# Patient Record
Sex: FEMALE | Race: WHITE | NOT HISPANIC OR LATINO | Employment: FULL TIME | URBAN - METROPOLITAN AREA
[De-identification: names, ages, dates, MRNs, and addresses within clinical notes are randomized per-mention and may not be internally consistent; named-entity substitution may affect disease eponyms.]

---

## 2020-07-08 ENCOUNTER — HOSPITAL ENCOUNTER (EMERGENCY)
Facility: HOSPITAL | Age: 25
Discharge: HOME | End: 2020-07-08
Admitting: EMERGENCY MEDICINE

## 2020-07-08 ENCOUNTER — APPOINTMENT (OUTPATIENT)
Dept: CT IMAGING | Facility: HOSPITAL | Age: 25
End: 2020-07-08

## 2020-07-08 VITALS
TEMPERATURE: 98.2 F | DIASTOLIC BLOOD PRESSURE: 64 MMHG | RESPIRATION RATE: 18 BRPM | HEIGHT: 62 IN | OXYGEN SATURATION: 98 % | WEIGHT: 155.2 LBS | HEART RATE: 99 BPM | SYSTOLIC BLOOD PRESSURE: 104 MMHG | BODY MASS INDEX: 28.56 KG/M2

## 2020-07-08 DIAGNOSIS — Z3A.22 22 WEEKS GESTATION OF PREGNANCY: ICD-10-CM

## 2020-07-08 DIAGNOSIS — R41.3 AMNESIA: Primary | ICD-10-CM

## 2020-07-08 LAB
ALBUMIN SERPL-MCNC: 3.5 G/DL (ref 3.5–5.2)
ALBUMIN/GLOB SERPL: 1.2 G/DL
ALP SERPL-CCNC: 86 U/L (ref 39–117)
ALT SERPL W P-5'-P-CCNC: 5 U/L (ref 1–33)
AMORPH URATE CRY URNS QL MICRO: ABNORMAL /HPF
ANION GAP SERPL CALCULATED.3IONS-SCNC: 11 MMOL/L (ref 5–15)
AST SERPL-CCNC: 16 U/L (ref 1–32)
BACTERIA UR QL AUTO: ABNORMAL /HPF
BASOPHILS # BLD AUTO: 0.1 10*3/MM3 (ref 0–0.2)
BASOPHILS NFR BLD AUTO: 0.7 % (ref 0–1.5)
BILIRUB SERPL-MCNC: 0.3 MG/DL (ref 0–1.2)
BILIRUB UR QL STRIP: NEGATIVE
BUN SERPL-MCNC: ABNORMAL MG/DL
BUN/CREAT SERPL: ABNORMAL
CALCIUM SPEC-SCNC: 8.8 MG/DL (ref 8.6–10.5)
CHLORIDE SERPL-SCNC: 106 MMOL/L (ref 98–107)
CLARITY UR: ABNORMAL
CO2 SERPL-SCNC: 23 MMOL/L (ref 22–29)
COLOR UR: ABNORMAL
CREAT SERPL-MCNC: 0.46 MG/DL (ref 0.57–1)
DEPRECATED RDW RBC AUTO: 35.4 FL (ref 37–54)
EOSINOPHIL # BLD AUTO: 0.1 10*3/MM3 (ref 0–0.4)
EOSINOPHIL NFR BLD AUTO: 0.7 % (ref 0.3–6.2)
ERYTHROCYTE [DISTWIDTH] IN BLOOD BY AUTOMATED COUNT: 15.5 % (ref 12.3–15.4)
GFR SERPL CREATININE-BSD FRML MDRD: >150 ML/MIN/1.73
GLOBULIN UR ELPH-MCNC: 2.9 GM/DL
GLUCOSE SERPL-MCNC: 100 MG/DL (ref 65–99)
GLUCOSE UR STRIP-MCNC: ABNORMAL MG/DL
HCT VFR BLD AUTO: 29.8 % (ref 34–46.6)
HGB BLD-MCNC: 9.6 G/DL (ref 12–15.9)
HGB UR QL STRIP.AUTO: NEGATIVE
HYALINE CASTS UR QL AUTO: ABNORMAL /LPF
KETONES UR QL STRIP: NEGATIVE
LEUKOCYTE ESTERASE UR QL STRIP.AUTO: ABNORMAL
LYMPHOCYTES # BLD AUTO: 2.3 10*3/MM3 (ref 0.7–3.1)
LYMPHOCYTES NFR BLD AUTO: 28.7 % (ref 19.6–45.3)
MCH RBC QN AUTO: 21.1 PG (ref 26.6–33)
MCHC RBC AUTO-ENTMCNC: 32.1 G/DL (ref 31.5–35.7)
MCV RBC AUTO: 65.7 FL (ref 79–97)
MONOCYTES # BLD AUTO: 0.7 10*3/MM3 (ref 0.1–0.9)
MONOCYTES NFR BLD AUTO: 8.5 % (ref 5–12)
MUCOUS THREADS URNS QL MICRO: ABNORMAL /HPF
NEUTROPHILS NFR BLD AUTO: 5 10*3/MM3 (ref 1.7–7)
NEUTROPHILS NFR BLD AUTO: 61.4 % (ref 42.7–76)
NITRITE UR QL STRIP: NEGATIVE
NRBC BLD AUTO-RTO: 0 /100 WBC (ref 0–0.2)
PH UR STRIP.AUTO: 8.5 [PH] (ref 5–8)
PLATELET # BLD AUTO: 303 10*3/MM3 (ref 140–450)
PMV BLD AUTO: 7.7 FL (ref 6–12)
POTASSIUM SERPL-SCNC: 3.7 MMOL/L (ref 3.5–5.2)
PROT SERPL-MCNC: 6.4 G/DL (ref 6–8.5)
PROT UR QL STRIP: ABNORMAL
RBC # BLD AUTO: 4.54 10*6/MM3 (ref 3.77–5.28)
RBC # UR: ABNORMAL /HPF
REF LAB TEST METHOD: ABNORMAL
SODIUM SERPL-SCNC: 140 MMOL/L (ref 136–145)
SP GR UR STRIP: 1.02 (ref 1–1.03)
SQUAMOUS #/AREA URNS HPF: ABNORMAL /HPF
UROBILINOGEN UR QL STRIP: ABNORMAL
WBC # BLD AUTO: 8.1 10*3/MM3 (ref 3.4–10.8)
WBC UR QL AUTO: ABNORMAL /HPF

## 2020-07-08 PROCEDURE — 80053 COMPREHEN METABOLIC PANEL: CPT | Performed by: NURSE PRACTITIONER

## 2020-07-08 PROCEDURE — 70450 CT HEAD/BRAIN W/O DYE: CPT

## 2020-07-08 PROCEDURE — 81001 URINALYSIS AUTO W/SCOPE: CPT | Performed by: NURSE PRACTITIONER

## 2020-07-08 PROCEDURE — 85025 COMPLETE CBC W/AUTO DIFF WBC: CPT | Performed by: NURSE PRACTITIONER

## 2020-07-08 PROCEDURE — 93005 ELECTROCARDIOGRAM TRACING: CPT

## 2020-07-08 PROCEDURE — 99284 EMERGENCY DEPT VISIT MOD MDM: CPT

## 2020-07-08 NOTE — ED PROVIDER NOTES
Subjective   Patient is a 25-year-old female complains of not remembering driving from [place] to [place] . patient states she had a similar episode last night when she drove from [place] to [place] and did not remember driving there.  Patient denies having a wreck and arrived at her destination safely.  States she has been having intermittent periods of lightheadedness and headaches.  Currently she is 22 weeks 1 day pregnant, OBGYN Dr. Reyes in [place].  LMP 2020.  Denies any vaginal bleeding abdominal pain short of breath chest pain numbness tingling or history of seizures.  Difficulty remembering is intermittent, mild, episodic, started 1 day ago.   A1          Review of Systems   Constitutional: Negative for chills, fatigue and fever.   HENT: Negative for congestion, sore throat, tinnitus and trouble swallowing.    Eyes: Negative for photophobia, discharge and visual disturbance.   Respiratory: Negative for cough and shortness of breath.    Cardiovascular: Negative for chest pain.   Gastrointestinal: Negative for abdominal pain, diarrhea, nausea and vomiting.   Genitourinary: Negative for dysuria, frequency and urgency.   Musculoskeletal: Negative for back pain and myalgias.   Skin: Negative for rash.   Neurological: Positive for light-headedness and headaches. Negative for dizziness.        Difficulty remembering   Psychiatric/Behavioral: Negative for confusion.       No past medical history on file.    Allergies   Allergen Reactions   • Bactrim [Sulfamethoxazole-Trimethoprim] Hives       No past surgical history on file.    No family history on file.    Social History     Socioeconomic History   • Marital status:      Spouse name: Not on file   • Number of children: Not on file   • Years of education: Not on file   • Highest education level: Not on file           Objective   Physical Exam   Constitutional: She is oriented to person, place, and time. She appears well-developed and  well-nourished.   HENT:   Head: Normocephalic and atraumatic.   Mouth/Throat: Oropharynx is clear and moist.   Eyes: Pupils are equal, round, and reactive to light. EOM are normal.   Neck: Normal range of motion. Neck supple.   Cardiovascular: Normal heart sounds.   Trace edema quan lower ext   Pulmonary/Chest: Effort normal and breath sounds normal. She has no wheezes. She has no rales.   Abdominal: Soft. Bowel sounds are normal.   22 weeks pregnant   Musculoskeletal: Normal range of motion.   Neurological: She is alert and oriented to person, place, and time. She has normal strength. No cranial nerve deficit or sensory deficit. GCS eye subscore is 4. GCS verbal subscore is 5. GCS motor subscore is 6.   Speech clear, no ataxia, no drift.   Skin: Skin is warm and dry.   Vitals reviewed.      ECG 12 Lead    Date/Time: 7/8/2020 5:12 PM  Performed by: Heather Gold APRN  Authorized by: Heather Gold APRN   Interpreted by physician  Comparison: not compared with previous ECG   Rhythm: sinus rhythm  Rate: normal  QRS axis: normal  Conduction: conduction normal  ST Segments: ST segments normal  Clinical impression: normal ECG                 ED Course.vs  Labs Reviewed   COMPREHENSIVE METABOLIC PANEL - Abnormal; Notable for the following components:       Result Value    Glucose 100 (*)     Creatinine 0.46 (*)     All other components within normal limits    Narrative:     GFR Normal >60  Chronic Kidney Disease <60  Kidney Failure <15     URINALYSIS W/ CULTURE IF INDICATED - Abnormal; Notable for the following components:    Color, UA Dark Yellow (*)     Appearance, UA Cloudy (*)     pH, UA 8.5 (*)     Glucose,  mg/dL (Trace) (*)     Protein, UA Trace (*)     Leuk Esterase, UA Moderate (2+) (*)     Urobilinogen, UA 2.0 E.U./dL (*)     All other components within normal limits   CBC WITH AUTO DIFFERENTIAL - Abnormal; Notable for the following components:    Hemoglobin 9.6 (*)     Hematocrit 29.8 (*)     MCV  65.7 (*)     MCH 21.1 (*)     RDW 15.5 (*)     RDW-SD 35.4 (*)     All other components within normal limits   URINALYSIS, MICROSCOPIC ONLY - Abnormal; Notable for the following components:    RBC, UA 0-2 (*)     WBC, UA 3-5 (*)     Bacteria, UA 1+ (*)     Squamous Epithelial Cells, UA 7-12 (*)     Mucus, UA Small/1+ (*)     All other components within normal limits   BUN - Normal   CBC AND DIFFERENTIAL    Narrative:     The following orders were created for panel order CBC & Differential.  Procedure                               Abnormality         Status                     ---------                               -----------         ------                     CBC Auto Differential[[id number]]        Abnormal            Final result                 Please view results for these tests on the individual orders.     Medications - No data to display  Ct Head Without Contrast    Result Date: 7/8/2020  No acute intracranial process identified.  Electronically Signed By-DR. Don Jamison MD On:7/8/2020 9:01 PM This report was finalized on [id number] by DR. Don Jamison MD.      ED Course as of Jul 08 2134   Wed Jul 08, 2020 2132 Spoke with Dr. Pinzon.    [KJ]      ED Course User Index  [KJ] Heather Gold, APRN                                           MDM  Number of Diagnoses or Management Options  22 weeks gestation of pregnancy:   Amnesia:     Fetal heart tones in the 140s.  CBC showed normal white count CMP glucose of 100 otherwise unremarkable, UA showed red and white cells with mucus and moderate leukocytes but appears to be contaminated.  CT head showed no acute process.  Discussed with patient if she was having periods of amnesia to no longer drive or operate heavy machinery until cleared by neurology.  Consulted Dr. Pinzon who was on-call for Dr. Reyes OB/GYN.  She stated for patient to call the office in the a.m. to see her OB/GYN this week and to make appointment with Dr. Joshua,  neurologist at [facility].  Return for worsening of symptoms.  Final diagnoses:   22 weeks gestation of pregnancy   Amnesia            Heather Gold, APRN  07/08/20 2518

## 2020-07-09 LAB — BUN SERPL-MCNC: 4 MG/DL (ref 6–20)

## 2020-07-09 NOTE — ED NOTES
Spoke with Dr. Robles at [facility] and she said she is going to get a hold of the OB on call and to have them call us.      Ita Larson  07/08/20 0559

## 2020-07-09 NOTE — DISCHARGE INSTRUCTIONS
Discharge home.  Call in the morning to schedule an appointment this week with your OB/GYN.  Call tomorrow and set up a neurologist appointment with  for further evaluation of episodic amnesia.  Do not operate heavy machinery and do not drive a car until further evaluated by neurology.  Return to the ER for worsening symptoms.

## 2022-05-19 ENCOUNTER — HOSPITAL ENCOUNTER (EMERGENCY)
Facility: HOSPITAL | Age: 27
Discharge: HOME | End: 2022-05-19
Attending: EMERGENCY MEDICINE | Admitting: EMERGENCY MEDICINE

## 2022-05-19 ENCOUNTER — APPOINTMENT (OUTPATIENT)
Dept: RESPIRATORY THERAPY | Facility: HOSPITAL | Age: 27
End: 2022-05-19

## 2022-05-19 ENCOUNTER — APPOINTMENT (OUTPATIENT)
Dept: GENERAL RADIOLOGY | Facility: HOSPITAL | Age: 27
End: 2022-05-19

## 2022-05-19 VITALS
RESPIRATION RATE: 16 BRPM | HEART RATE: 94 BPM | BODY MASS INDEX: 36.25 KG/M2 | OXYGEN SATURATION: 99 % | WEIGHT: 197 LBS | SYSTOLIC BLOOD PRESSURE: 102 MMHG | TEMPERATURE: 98.3 F | HEIGHT: 62 IN | DIASTOLIC BLOOD PRESSURE: 64 MMHG

## 2022-05-19 DIAGNOSIS — R00.2 PALPITATION: ICD-10-CM

## 2022-05-19 DIAGNOSIS — R07.9 CHEST PAIN, UNSPECIFIED TYPE: Primary | ICD-10-CM

## 2022-05-19 DIAGNOSIS — R00.2 PALPITATIONS: ICD-10-CM

## 2022-05-19 LAB
ALBUMIN SERPL-MCNC: 4 G/DL (ref 3.5–5.2)
ALBUMIN/GLOB SERPL: 1.1 G/DL
ALP SERPL-CCNC: 128 U/L (ref 39–117)
ALT SERPL W P-5'-P-CCNC: 24 U/L (ref 1–33)
ANION GAP SERPL CALCULATED.3IONS-SCNC: 12 MMOL/L (ref 5–15)
AST SERPL-CCNC: 20 U/L (ref 1–32)
B-HCG UR QL: NEGATIVE
BACTERIA UR QL AUTO: ABNORMAL /HPF
BASOPHILS # BLD AUTO: 0.1 10*3/MM3 (ref 0–0.2)
BASOPHILS NFR BLD AUTO: 0.5 % (ref 0–1.5)
BILIRUB SERPL-MCNC: 0.2 MG/DL (ref 0–1.2)
BILIRUB UR QL STRIP: NEGATIVE
BUN SERPL-MCNC: 8 MG/DL (ref 6–20)
BUN/CREAT SERPL: 12.9 (ref 7–25)
CALCIUM SPEC-SCNC: 8.9 MG/DL (ref 8.6–10.5)
CHLORIDE SERPL-SCNC: 104 MMOL/L (ref 98–107)
CLARITY UR: CLEAR
CO2 SERPL-SCNC: 22 MMOL/L (ref 22–29)
COLOR UR: YELLOW
CREAT SERPL-MCNC: 0.62 MG/DL (ref 0.57–1)
D DIMER PPP FEU-MCNC: 0.43 MG/L (FEU) (ref 0–0.59)
DEPRECATED RDW RBC AUTO: 38.5 FL (ref 37–54)
EGFRCR SERPLBLD CKD-EPI 2021: 127.7 ML/MIN/1.73
EOSINOPHIL # BLD AUTO: 0.1 10*3/MM3 (ref 0–0.4)
EOSINOPHIL NFR BLD AUTO: 1.2 % (ref 0.3–6.2)
ERYTHROCYTE [DISTWIDTH] IN BLOOD BY AUTOMATED COUNT: 14.9 % (ref 12.3–15.4)
GLOBULIN UR ELPH-MCNC: 3.6 GM/DL
GLUCOSE SERPL-MCNC: 93 MG/DL (ref 65–99)
GLUCOSE UR STRIP-MCNC: NEGATIVE MG/DL
HCT VFR BLD AUTO: 41.3 % (ref 34–46.6)
HGB BLD-MCNC: 13.7 G/DL (ref 12–15.9)
HGB UR QL STRIP.AUTO: NEGATIVE
HYALINE CASTS UR QL AUTO: ABNORMAL /LPF
KETONES UR QL STRIP: NEGATIVE
LEUKOCYTE ESTERASE UR QL STRIP.AUTO: ABNORMAL
LYMPHOCYTES # BLD AUTO: 3.5 10*3/MM3 (ref 0.7–3.1)
LYMPHOCYTES NFR BLD AUTO: 33.6 % (ref 19.6–45.3)
MAGNESIUM SERPL-MCNC: 1.9 MG/DL (ref 1.6–2.6)
MCH RBC QN AUTO: 24 PG (ref 26.6–33)
MCHC RBC AUTO-ENTMCNC: 33.1 G/DL (ref 31.5–35.7)
MCV RBC AUTO: 72.6 FL (ref 79–97)
MONOCYTES # BLD AUTO: 0.7 10*3/MM3 (ref 0.1–0.9)
MONOCYTES NFR BLD AUTO: 7 % (ref 5–12)
NEUTROPHILS NFR BLD AUTO: 57.7 % (ref 42.7–76)
NEUTROPHILS NFR BLD AUTO: 6 10*3/MM3 (ref 1.7–7)
NITRITE UR QL STRIP: NEGATIVE
NRBC BLD AUTO-RTO: 0.1 /100 WBC (ref 0–0.2)
PH UR STRIP.AUTO: 7.5 [PH] (ref 5–8)
PLATELET # BLD AUTO: 356 10*3/MM3 (ref 140–450)
PMV BLD AUTO: 7.2 FL (ref 6–12)
POTASSIUM SERPL-SCNC: 4.2 MMOL/L (ref 3.5–5.2)
PROT SERPL-MCNC: 7.6 G/DL (ref 6–8.5)
PROT UR QL STRIP: NEGATIVE
RBC # BLD AUTO: 5.68 10*6/MM3 (ref 3.77–5.28)
RBC # UR STRIP: ABNORMAL /HPF
REF LAB TEST METHOD: ABNORMAL
SODIUM SERPL-SCNC: 138 MMOL/L (ref 136–145)
SP GR UR STRIP: 1.01 (ref 1–1.03)
SQUAMOUS #/AREA URNS HPF: ABNORMAL /HPF
TROPONIN T SERPL-MCNC: <0.01 NG/ML (ref 0–0.03)
TSH SERPL DL<=0.05 MIU/L-ACNC: 0.86 UIU/ML (ref 0.27–4.2)
UROBILINOGEN UR QL STRIP: ABNORMAL
WBC # UR STRIP: ABNORMAL /HPF
WBC NRBC COR # BLD: 10.4 10*3/MM3 (ref 3.4–10.8)

## 2022-05-19 PROCEDURE — 85379 FIBRIN DEGRADATION QUANT: CPT | Performed by: NURSE PRACTITIONER

## 2022-05-19 PROCEDURE — 36415 COLL VENOUS BLD VENIPUNCTURE: CPT

## 2022-05-19 PROCEDURE — 99283 EMERGENCY DEPT VISIT LOW MDM: CPT

## 2022-05-19 PROCEDURE — 83735 ASSAY OF MAGNESIUM: CPT | Performed by: NURSE PRACTITIONER

## 2022-05-19 PROCEDURE — 81001 URINALYSIS AUTO W/SCOPE: CPT | Performed by: NURSE PRACTITIONER

## 2022-05-19 PROCEDURE — 84443 ASSAY THYROID STIM HORMONE: CPT | Performed by: NURSE PRACTITIONER

## 2022-05-19 PROCEDURE — 84484 ASSAY OF TROPONIN QUANT: CPT | Performed by: NURSE PRACTITIONER

## 2022-05-19 PROCEDURE — 93005 ELECTROCARDIOGRAM TRACING: CPT

## 2022-05-19 PROCEDURE — 93005 ELECTROCARDIOGRAM TRACING: CPT | Performed by: EMERGENCY MEDICINE

## 2022-05-19 PROCEDURE — 85025 COMPLETE CBC W/AUTO DIFF WBC: CPT | Performed by: NURSE PRACTITIONER

## 2022-05-19 PROCEDURE — 93242 EXT ECG>48HR<7D RECORDING: CPT

## 2022-05-19 PROCEDURE — 81025 URINE PREGNANCY TEST: CPT | Performed by: NURSE PRACTITIONER

## 2022-05-19 PROCEDURE — 80053 COMPREHEN METABOLIC PANEL: CPT | Performed by: NURSE PRACTITIONER

## 2022-05-19 PROCEDURE — 93225 XTRNL ECG REC<48 HRS REC: CPT

## 2022-05-19 PROCEDURE — 71045 X-RAY EXAM CHEST 1 VIEW: CPT

## 2022-05-19 RX ORDER — SODIUM CHLORIDE 0.9 % (FLUSH) 0.9 %
10 SYRINGE (ML) INJECTION AS NEEDED
Status: DISCONTINUED | OUTPATIENT
Start: 2022-05-19 | End: 2022-05-19 | Stop reason: HOSPADM

## 2022-05-19 NOTE — ED NOTES
Pt reports ongoing midsternal chest pain with SOB. Pt has hx of anxiety/depression and does take medications. Pt reports frustration with pcp and cardiologist with lack of availability to be seen and informed she just wanted to come to ED for an evaluation.

## 2022-05-20 NOTE — DISCHARGE INSTRUCTIONS
Wear Holter monitor as instructed.  Continue any current home medications.  Rest.  Drink plenty of fluids.  Follow-up with your primary care provider and cardiology.  Return for new or worsening symptoms.

## 2022-05-20 NOTE — ED PROVIDER NOTES
Subjective   Patient is a 26-year-old white female with no significant medical history who presents today with complaints of sharp central chest pain and shortness of breath.  She denies any radiation of her pain.  She states she has had the symptoms intermittently for the last couple of years.  She states its been about 2 years since she last saw her cardiologist who at that time performed Holter monitor and echocardiogram.  She states in the last couple of weeks she has had increase in frequency of her symptoms though she is also had an increase in stress at her job.  She states she followed up with a cardiologist earlier in the week and states she is supposed to have some further testing done but states she has not heard back from the office.  She presents to the ED today for evaluation.  She states at times she feels like her heart is racing.  She denies any syncope.  Denies fever chills cough congestion leg pain or swelling.  She denies alleviating or exacerbating factors.          Review of Systems   Constitutional: Negative.    HENT: Negative.    Eyes: Negative.    Respiratory: Positive for shortness of breath.    Cardiovascular: Positive for chest pain and palpitations. Negative for leg swelling.   Gastrointestinal: Negative.    Genitourinary: Negative.    Musculoskeletal: Negative.    Skin: Negative.    Neurological: Negative.        No past medical history on file.    Allergies   Allergen Reactions   • Bactrim [Sulfamethoxazole-Trimethoprim] Hives       No past surgical history on file.    No family history on file.    Social History     Socioeconomic History   • Marital status:            Objective   Physical Exam  Vital signs and triage nurse note reviewed.  Constitutional: Awake, alert; well-developed and well-nourished. No acute distress is noted.  Obese.  HEENT: Normocephalic, atraumatic; pupils are PERRL with intact EOM; oropharynx is pink and moist without exudate or erythema.  No drooling or  pooling of oral secretions.  Neck: Supple, full range of motion without pain; no cervical lymphadenopathy. Normal phonation.  Cardiovascular: Regular rate and rhythm, normal S1-S2.  No murmur noted.  Pulmonary: Respiratory effort regular nonlabored, breath sounds clear to auscultation all fields.  Abdomen: Soft, nontender, nondistended with normoactive bowel sounds; no rebound or guarding.  Musculoskeletal: Independent range of motion of all extremities with no palpable tenderness or edema.  Neuro: Alert oriented x3, speech is clear and appropriate, GCS 15.    Skin: Flesh tone, warm, dry, intact; no erythematous or petechial rash or lesion.      Procedures           ED Course      Labs Reviewed   COMPREHENSIVE METABOLIC PANEL - Abnormal; Notable for the following components:       Result Value    Alkaline Phosphatase 128 (*)     All other components within normal limits    Narrative:     GFR Normal >60  Chronic Kidney Disease <60  Kidney Failure <15     URINALYSIS W/ MICROSCOPIC IF INDICATED (NO CULTURE) - Abnormal; Notable for the following components:    Leuk Esterase, UA Small (1+) (*)     All other components within normal limits   CBC WITH AUTO DIFFERENTIAL - Abnormal; Notable for the following components:    RBC 5.68 (*)     MCV 72.6 (*)     MCH 24.0 (*)     Lymphocytes, Absolute 3.50 (*)     All other components within normal limits   URINALYSIS, MICROSCOPIC ONLY - Abnormal; Notable for the following components:    RBC, UA 0-2 (*)     WBC, UA 3-5 (*)     Bacteria, UA Trace (*)     Squamous Epithelial Cells, UA 7-12 (*)     All other components within normal limits   PREGNANCY, URINE - Normal   TROPONIN (IN-HOUSE) - Normal    Narrative:     Troponin T Reference Range:  <= 0.03 ng/mL-   Negative for AMI  >0.03 ng/mL-     Abnormal for myocardial necrosis.  Clinicians would have to utilize clinical acumen, EKG, Troponin and serial changes to determine if it is an Acute Myocardial Infarction or myocardial injury  due to an underlying chronic condition.       Results may be falsely decreased if patient taking Biotin.     D-DIMER, QUANTITATIVE - Normal    Narrative:     Reference Range  --------------------------------------------------------------------     < 0.50   Negative Predictive Value  0.50-0.59   Indeterminate    >= 0.60   Probable VTE             A very low percentage of patients with DVT may yield D-Dimer results   below the cut-off of 0.50 mg/L FEU.  This is known to be more   prevalent in patients with distal DVT.             Results of this test should always be interpreted in conjunction with   the patient's medical history, clinical presentation and other   findings.  Clinical diagnosis should not be based on the result of   INNOVANCE D-Dimer alone.   MAGNESIUM - Normal   TSH - Normal   CBC AND DIFFERENTIAL    Narrative:     The following orders were created for panel order CBC & Differential.  Procedure                               Abnormality         Status                     ---------                               -----------         ------                     CBC Auto Differential[[id number]]        Abnormal            Final result               Scan Slide[[id number]]                                                                    Please view results for these tests on the individual orders.     XR Chest 1 View    Result Date: 5/19/2022  No active disease.  Electronically Signed By-Tristen Cunningham MD On:5/19/2022 6:31 PM This report was finalized on [id number] by  Tristen Cunningham MD.    Medications - No data to display                                             MDM  Number of Diagnoses or Management Options  Chest pain, unspecified type  Palpitations  Diagnosis management comments: Patient was placed on continuous cardiac monitor.  She had IV established.  She had labs, EKG and chest x-ray obtained.    Work-up: EKG reviewed by me and interpreted by Dr. Guerrero shows sinus rhythm with ventricular rate of 90,  no acute ST or T wave changes, no ectopy.  CBC and metabolic panel are unremarkable.  Negative troponin.  D-dimer within normal limits.  TSH within normal limits.  Normal magnesium.  Urine hCG negative.  Chest x-ray shows no acute abnormality.    On reexamination the patient is resting comfortably in no distress.  She has no new complaints.  She is hemodynamically stable.  She denies chest pain currently.  Patient had Holter monitor placed in the ED prior to discharge.    Diagnosis and treatment plan discussed with patient.  Patient agreeable to plan.   I discussed findings with patient who voices understanding of discharge instructions, signs and symptoms requiring return to ED; discharged improved and in stable condition with follow up for re-evaluation.         Amount and/or Complexity of Data Reviewed  Clinical lab tests: reviewed and ordered  Tests in the radiology section of CPT®: reviewed and ordered  Tests in the medicine section of CPT®: reviewed    Patient Progress  Patient progress: stable      Final diagnoses:   Chest pain, unspecified type   Palpitations       ED Disposition  ED Disposition     ED Disposition   Discharge    Condition   Stable    Comment   --             Toan Palm MD  [address] [place] [address]  [phone]          Berhane Kraus MD  [address] [facility] [place] [address]  [phone]          PATIENT CONNECTION - [place] [id number]  [phone]             Medication List      No changes were made to your prescriptions during this visit.          Vida Koo, MARICRUZ  05/20/22 0854

## 2022-05-22 LAB — QT INTERVAL: 351 MS

## 2022-06-08 ENCOUNTER — OFFICE VISIT (OUTPATIENT)
Dept: CARDIOLOGY | Facility: CLINIC | Age: 27
End: 2022-06-08

## 2022-06-08 VITALS
BODY MASS INDEX: 36.4 KG/M2 | HEIGHT: 62 IN | WEIGHT: 197.8 LBS | RESPIRATION RATE: 18 BRPM | HEART RATE: 105 BPM | DIASTOLIC BLOOD PRESSURE: 68 MMHG | SYSTOLIC BLOOD PRESSURE: 98 MMHG

## 2022-06-08 DIAGNOSIS — R00.2 PALPITATIONS: Primary | ICD-10-CM

## 2022-06-08 DIAGNOSIS — R07.89 CHEST PAIN, ATYPICAL: ICD-10-CM

## 2022-06-08 PROCEDURE — 99204 OFFICE O/P NEW MOD 45 MIN: CPT | Performed by: INTERNAL MEDICINE

## 2022-06-08 RX ORDER — BUPROPION HYDROCHLORIDE 150 MG/1
150 TABLET ORAL DAILY
Status: ON HOLD | COMMUNITY
Start: 2022-03-05 | End: 2023-03-05

## 2022-06-09 PROCEDURE — 93244 EXT ECG>48HR<7D REV&INTERPJ: CPT | Performed by: INTERNAL MEDICINE

## 2022-06-14 PROBLEM — R00.2 PALPITATIONS: Status: ACTIVE | Noted: 2022-06-14

## 2022-06-14 PROBLEM — R07.89 CHEST PAIN, ATYPICAL: Status: ACTIVE | Noted: 2022-06-14

## 2022-06-14 NOTE — PROGRESS NOTES
Cardiology Clinic Note  Toan Palm MD, PhD    Subjective:     Encounter Date:06/08/2022      Patient ID: Myrtle Clifford is a 27 y.o. female.    Chief Complaint:  Chief Complaint   Patient presents with   • Palpitations       HPI:    I had the pleasure to see this very pleasant 27-year-old female complaining of chest pain and palpitations, presented to the ER in May of this year for evaluation.  Blood pressure today 98/68 with heart rates in the low 100s.  She had a Holter monitor which demonstrated sinus rhythm, sinus tachycardia, occasional PVCs, 2D echo at an outside hospital revealed structurally normal heart with no significant valvular abnormality, normal myocardial thickness, no masses or effusions, normal EF 60%, normal RV size and function as well.  She is very low risk for any coronary disease and does not have any anginal chest pain.  She has no family history of early sudden cardiac death or CAD.  She is a former smoker which was very limited for only a couple of years.  Reassurance was provided with unremarkable findings by Holter monitor as well as structurally normal heart by 2D echo.  Would like her to increase activity with BMI greater than 35 for weight loss moving forward with obesity and young age.  This was discussed at length to minimize healthcare concerns moving forward as she goes through her life to get ahead of this early    Review of systems otherwise negative x14 point review of systems except was mentioned above    Historical data copied forward from previous encounters in EMR including the history, exam, and assessment/plan has been reviewed and is unchanged unless noted otherwise.    Cardiac medicines reviewed with risk, benefits, and necessity of each discussed.    Risk and benefit of cardiac testing reviewed including death heart attack stroke pain bleeding infection need for vascular /cardiovascular surgery were discussed and the patient     Objective:         BP 98/68 (BP  "Location: Left arm, Patient Position: Sitting)   Pulse 105   Resp 18   Ht 157.5 cm (62\")   Wt 89.7 kg (197 lb 12.8 oz)   BMI 36.18 kg/m²     Physical Exam  Regular rate and rhythm 90s, no rubs murmurs gallops no heaves or lift  Obese soft nontender nondistended  No clubbing cyanosis or edema  Radial pulses 2+ equal bilaterally  No carotid bruits or JVD  Normocephalic/atraumatic pupils equal round  Neurologically intact grossly  Assessment:         Atypical chest pain, palpitations  Normal Holter for 2 weeks  Normal echo  Structurally normal heart  Low risk for CAD  Does not need ischemic evaluation  Normal EKG with normal conduction    Primary prevention goals  Diet and exercise per AHA guidelines    Back to primary care    Back to clinic here in 1 year as needed    Greater than 25 minutes of face-to-face with the patient as well as in coordination of care, charting, review of documentation and EMR      The pleasure to be involved in this patient's cardiovascular care.  Please call with any questions or concerns  Toan Palm MD, PhD    Most recent EKG as reviewed and interpreted by me:  Procedures     Most recent echo as reviewed and interpreted by me:      Most recent stress test as reviewed and interpreted by me:      Most recent cardiac catheterization as reviewed interpreted by me:  No results found for this or any previous visit.    The following portions of the patient's history were reviewed and updated as appropriate: allergies, current medications, past family history, past medical history, past social history, past surgical history and problem list.      ROS:  14 point review of systems negative except as mentioned above    Current Outpatient Medications:   •  buPROPion XL (WELLBUTRIN XL) 150 MG 24 hr tablet, Take 150 mg by mouth Daily., Disp: , Rfl:     Problem List:  There is no problem list on file for this patient.    Past Medical History:  History reviewed. No pertinent past medical " history.  Past Surgical History:  History reviewed. No pertinent surgical history.  Social History:  Social History     Socioeconomic History   • Marital status:    Tobacco Use   • Smoking status: Former Smoker     Packs/day: 0.50     Years: 2.00     Pack years: 1.00     Types: Cigarettes     Quit date:      Years since quittin.4   Substance and Sexual Activity   • Alcohol use: Not Currently     Allergies:  Allergies   Allergen Reactions   • Bactrim [Sulfamethoxazole-Trimethoprim] Hives     Immunizations:    There is no immunization history on file for this patient.         In-Office Procedure(s):  No orders to display        ASCVD RIsk Score::  The ASCVD Risk score (Ponca RONALDO Rosado, et al., 2013) failed to calculate for the following reasons:    The 2013 ASCVD risk score is only valid for ages 40 to 79    Imaging:    Results for orders placed during the hospital encounter of 22    XR Chest 1 View    Narrative  DATE OF EXAM:  2022 6:08 PM    PROCEDURE:  XR CHEST 1 VW-    INDICATIONS:  Chest pain, shortness of breath, previous tobacco abuse.    COMPARISON:  No Comparisons Available    TECHNIQUE:  Single radiographic view of the chest was obtained.    FINDINGS:  The heart size is normal. The pulmonary vascular markings are normal.  The lungs and pleural spaces are clear of active disease.  The bony  thorax is normal for age.    Impression  No active disease.    Electronically Signed By-Tristen Cunningham MD On:2022 6:31 PM  This report was finalized on [id number] by  Tristen Cunningham MD.       Results for orders placed during the hospital encounter of 20    CT Head Without Contrast    Narrative  CT HEAD WO CONTRAST-    Date of Exam: 2020 8:35 PM    Indication: amnesia, lightheaded, headaches.    Comparison: None available.    Technique: CT scan of the head without IV contrast.  Automated exposure  control and iterative reconstruction methods were used.    FINDINGS  No acute intracranial  hemorrhage or extra-axial collection is  identified. The ventricles appear normal in caliber, with no evidence of  mass effect or midline shift. The basal cisterns appear patent. The  gray-white differentiation appears preserved.    The calvarium appears intact. The visualized paranasal sinuses are  clear. The mastoid air cells are well-aerated.    Impression  No acute intracranial process identified.    Electronically Signed By-DR. Don Jamison MD On:7/8/2020 9:01 PM  This report was finalized on [id number] by DR. Don Jamison MD.      Results for orders placed during the hospital encounter of 07/08/20    CT Head Without Contrast    Narrative  CT HEAD WO CONTRAST-    Date of Exam: 7/8/2020 8:35 PM    Indication: amnesia, lightheaded, headaches.    Comparison: None available.    Technique: CT scan of the head without IV contrast.  Automated exposure  control and iterative reconstruction methods were used.    FINDINGS  No acute intracranial hemorrhage or extra-axial collection is  identified. The ventricles appear normal in caliber, with no evidence of  mass effect or midline shift. The basal cisterns appear patent. The  gray-white differentiation appears preserved.    The calvarium appears intact. The visualized paranasal sinuses are  clear. The mastoid air cells are well-aerated.    Impression  No acute intracranial process identified.    Electronically Signed By-DR. Don Jamison MD On:7/8/2020 9:01 PM  This report was finalized on [id number] by DR. Don Jamison MD.      Lab Review:   Admission on 05/19/2022, Discharged on 05/19/2022   Component Date Value   • QT Interval 05/19/2022 351    • Glucose 05/19/2022 93    • BUN 05/19/2022 8    • Creatinine 05/19/2022 0.62    • Sodium 05/19/2022 138    • Potassium 05/19/2022 4.2    • Chloride 05/19/2022 104    • CO2 05/19/2022 22.0    • Calcium 05/19/2022 8.9    • Total Protein 05/19/2022 7.6    • Albumin 05/19/2022 4.00    • ALT (SGPT) 05/19/2022  24    • AST (SGOT) 05/19/2022 20    • Alkaline Phosphatase 05/19/2022 128 (A)   • Total Bilirubin 05/19/2022 0.2    • Globulin 05/19/2022 3.6    • A/G Ratio 05/19/2022 1.1    • BUN/Creatinine Ratio 05/19/2022 12.9    • Anion Gap 05/19/2022 12.0    • eGFR 05/19/2022 127.7    • HCG, Urine QL 05/19/2022 Negative    • Color, UA 05/19/2022 Yellow    • Appearance, UA 05/19/2022 Clear    • pH, UA 05/19/2022 7.5    • Specific Gravity, UA 05/19/2022 1.012    • Glucose, UA 05/19/2022 Negative    • Ketones, UA 05/19/2022 Negative    • Bilirubin, UA 05/19/2022 Negative    • Blood, UA 05/19/2022 Negative    • Protein, UA 05/19/2022 Negative    • Leuk Esterase, UA 05/19/2022 Small (1+) (A)   • Nitrite, UA 05/19/2022 Negative    • Urobilinogen, UA 05/19/2022 1.0 E.U./dL    • Troponin T 05/19/2022 <0.010    • D-Dimer, Quantitative 05/19/2022 0.43    • Magnesium 05/19/2022 1.9    • TSH 05/19/2022 0.856    • WBC 05/19/2022 10.40    • RBC 05/19/2022 5.68 (A)   • Hemoglobin 05/19/2022 13.7    • Hematocrit 05/19/2022 41.3    • MCV 05/19/2022 72.6 (A)   • MCH 05/19/2022 24.0 (A)   • MCHC 05/19/2022 33.1    • RDW 05/19/2022 14.9    • RDW-SD 05/19/2022 38.5    • MPV 05/19/2022 7.2    • Platelets 05/19/2022 356    • Neutrophil % 05/19/2022 57.7    • Lymphocyte % 05/19/2022 33.6    • Monocyte % 05/19/2022 7.0    • Eosinophil % 05/19/2022 1.2    • Basophil % 05/19/2022 0.5    • Neutrophils, Absolute 05/19/2022 6.00    • Lymphocytes, Absolute 05/19/2022 3.50 (A)   • Monocytes, Absolute 05/19/2022 0.70    • Eosinophils, Absolute 05/19/2022 0.10    • Basophils, Absolute 05/19/2022 0.10    • nRBC 05/19/2022 0.1    • RBC, UA 05/19/2022 0-2 (A)   • WBC, UA 05/19/2022 3-5 (A)   • Bacteria, UA 05/19/2022 Trace (A)   • Squamous Epithelial Cell* 05/19/2022 7-12 (A)   • Hyaline Casts, UA 05/19/2022 None Seen    • Methodology 05/19/2022 Automated Microscopy      Recent labs reviewed and interpreted for clinical significance and application             Level of Care:           Toan Palm MD  06/14/22  .

## 2022-08-31 LAB
EXTERNAL ABO GROUPING: NORMAL
EXTERNAL RH FACTOR: POSITIVE
HIV1 P24 AG SERPL QL IA: NORMAL

## 2023-03-05 ENCOUNTER — HOSPITAL ENCOUNTER (OUTPATIENT)
Facility: HOSPITAL | Age: 28
Discharge: HOME | End: 2023-03-05
Attending: STUDENT IN AN ORGANIZED HEALTH CARE EDUCATION/TRAINING PROGRAM | Admitting: OBSTETRICS & GYNECOLOGY
Payer: COMMERCIAL

## 2023-03-05 VITALS
HEIGHT: 62 IN | OXYGEN SATURATION: 97 % | TEMPERATURE: 97.9 F | DIASTOLIC BLOOD PRESSURE: 68 MMHG | HEART RATE: 110 BPM | BODY MASS INDEX: 31.73 KG/M2 | WEIGHT: 172.4 LBS | SYSTOLIC BLOOD PRESSURE: 104 MMHG | RESPIRATION RATE: 18 BRPM

## 2023-03-05 LAB — SARS-COV-2 RNA RESP QL NAA+PROBE: NOT DETECTED

## 2023-03-05 PROCEDURE — G0463 HOSPITAL OUTPT CLINIC VISIT: HCPCS

## 2023-03-05 PROCEDURE — C9803 HOPD COVID-19 SPEC COLLECT: HCPCS

## 2023-03-05 PROCEDURE — 87635 SARS-COV-2 COVID-19 AMP PRB: CPT | Performed by: OBSTETRICS & GYNECOLOGY

## 2023-03-05 PROCEDURE — 96360 HYDRATION IV INFUSION INIT: CPT

## 2023-03-05 RX ORDER — PRENATAL VIT NO.126/IRON/FOLIC 28MG-0.8MG
1 TABLET ORAL DAILY
COMMUNITY

## 2023-03-05 RX ORDER — SODIUM CHLORIDE, SODIUM LACTATE, POTASSIUM CHLORIDE, CALCIUM CHLORIDE 600; 310; 30; 20 MG/100ML; MG/100ML; MG/100ML; MG/100ML
999 INJECTION, SOLUTION INTRAVENOUS ONCE AS NEEDED
Status: COMPLETED | OUTPATIENT
Start: 2023-03-05 | End: 2023-03-05

## 2023-03-05 RX ORDER — HYDROXYZINE HYDROCHLORIDE 10 MG/1
25 TABLET, FILM COATED ORAL NIGHTLY
COMMUNITY
End: 2023-04-04 | Stop reason: HOSPADM

## 2023-03-05 RX ORDER — PANTOPRAZOLE SODIUM 20 MG/1
40 TABLET, DELAYED RELEASE ORAL 2 TIMES DAILY
COMMUNITY

## 2023-03-05 RX ADMIN — SODIUM CHLORIDE, POTASSIUM CHLORIDE, SODIUM LACTATE AND CALCIUM CHLORIDE 999 ML/HR: 600; 310; 30; 20 INJECTION, SOLUTION INTRAVENOUS at 19:57

## 2023-03-20 ENCOUNTER — HOSPITAL ENCOUNTER (OUTPATIENT)
Facility: HOSPITAL | Age: 28
Discharge: HOME | End: 2023-03-20
Attending: STUDENT IN AN ORGANIZED HEALTH CARE EDUCATION/TRAINING PROGRAM | Admitting: OBSTETRICS & GYNECOLOGY
Payer: COMMERCIAL

## 2023-03-20 VITALS
OXYGEN SATURATION: 99 % | HEIGHT: 62 IN | TEMPERATURE: 98.5 F | RESPIRATION RATE: 18 BRPM | BODY MASS INDEX: 33.15 KG/M2 | DIASTOLIC BLOOD PRESSURE: 75 MMHG | HEART RATE: 106 BPM | SYSTOLIC BLOOD PRESSURE: 109 MMHG | WEIGHT: 180.12 LBS

## 2023-03-20 PROBLEM — M54.9 BACK PAIN: Status: ACTIVE | Noted: 2023-03-20

## 2023-03-20 LAB
BILIRUB UR QL STRIP: NEGATIVE
CLARITY UR: CLEAR
COLOR UR: YELLOW
GLUCOSE UR STRIP-MCNC: NEGATIVE MG/DL
HGB UR QL STRIP.AUTO: NEGATIVE
KETONES UR QL STRIP: NEGATIVE
LEUKOCYTE ESTERASE UR QL STRIP.AUTO: NEGATIVE
NITRITE UR QL STRIP: NEGATIVE
PH UR STRIP.AUTO: 6.5 [PH] (ref 5–8)
PROT UR QL STRIP: NEGATIVE
SP GR UR STRIP: 1.01 (ref 1–1.03)
UROBILINOGEN UR QL STRIP: NORMAL

## 2023-03-20 PROCEDURE — 87086 URINE CULTURE/COLONY COUNT: CPT | Performed by: OBSTETRICS & GYNECOLOGY

## 2023-03-20 PROCEDURE — 81003 URINALYSIS AUTO W/O SCOPE: CPT | Performed by: OBSTETRICS & GYNECOLOGY

## 2023-03-20 PROCEDURE — G0463 HOSPITAL OUTPT CLINIC VISIT: HCPCS

## 2023-03-20 RX ORDER — ONDANSETRON 4 MG/1
4 TABLET, FILM COATED ORAL EVERY 8 HOURS PRN
COMMUNITY
End: 2023-04-04 | Stop reason: HOSPADM

## 2023-03-20 RX ORDER — FERROUS SULFATE 325(65) MG
325 TABLET ORAL
COMMUNITY

## 2023-03-20 NOTE — NURSING NOTE
"Pt here with c/o \"back pain\" Pt denies any vaginal bleeding, but had \"pieces of mucousy discharge\" past few days. Pt rates pain \"8-9\" on scale to left flank area describing \"sharp/stabbing.\" Appears in no distress or discomfort, no grimacing. Plan of care discussed. Pt stated she took \"tylenol\" this morning. Pain started last night at 10:00pm. Will notify md of pt here.   "

## 2023-03-21 LAB — BACTERIA SPEC AEROBE CULT: NORMAL

## 2023-03-29 ENCOUNTER — HOSPITAL ENCOUNTER (OUTPATIENT)
Facility: HOSPITAL | Age: 28
Discharge: HOME | End: 2023-03-29
Attending: OBSTETRICS & GYNECOLOGY | Admitting: OBSTETRICS & GYNECOLOGY
Payer: COMMERCIAL

## 2023-03-29 VITALS
HEIGHT: 62 IN | TEMPERATURE: 98 F | HEART RATE: 105 BPM | WEIGHT: 173.94 LBS | SYSTOLIC BLOOD PRESSURE: 114 MMHG | RESPIRATION RATE: 18 BRPM | OXYGEN SATURATION: 98 % | DIASTOLIC BLOOD PRESSURE: 71 MMHG | BODY MASS INDEX: 32.01 KG/M2

## 2023-03-29 PROCEDURE — G0463 HOSPITAL OUTPT CLINIC VISIT: HCPCS

## 2023-03-29 PROCEDURE — 25010000002 ONDANSETRON PER 1 MG: Performed by: OBSTETRICS & GYNECOLOGY

## 2023-03-29 PROCEDURE — 96374 THER/PROPH/DIAG INJ IV PUSH: CPT

## 2023-03-29 RX ORDER — SODIUM CHLORIDE 0.9 % (FLUSH) 0.9 %
10 SYRINGE (ML) INJECTION EVERY 12 HOURS SCHEDULED
Status: DISCONTINUED | OUTPATIENT
Start: 2023-03-29 | End: 2023-03-29 | Stop reason: HOSPADM

## 2023-03-29 RX ORDER — CYCLOBENZAPRINE HCL 10 MG
10 TABLET ORAL 3 TIMES DAILY
Status: COMPLETED | OUTPATIENT
Start: 2023-03-29 | End: 2023-03-29

## 2023-03-29 RX ORDER — SODIUM CHLORIDE 0.9 % (FLUSH) 0.9 %
10 SYRINGE (ML) INJECTION AS NEEDED
Status: DISCONTINUED | OUTPATIENT
Start: 2023-03-29 | End: 2023-03-29 | Stop reason: HOSPADM

## 2023-03-29 RX ORDER — CYCLOBENZAPRINE HCL 10 MG
10 TABLET ORAL 3 TIMES DAILY
Status: DISCONTINUED | OUTPATIENT
Start: 2023-03-29 | End: 2023-03-29

## 2023-03-29 RX ORDER — LIDOCAINE HYDROCHLORIDE 10 MG/ML
5 INJECTION, SOLUTION EPIDURAL; INFILTRATION; INTRACAUDAL; PERINEURAL AS NEEDED
Status: DISCONTINUED | OUTPATIENT
Start: 2023-03-29 | End: 2023-03-29 | Stop reason: HOSPADM

## 2023-03-29 RX ORDER — ONDANSETRON 2 MG/ML
4 INJECTION INTRAMUSCULAR; INTRAVENOUS EVERY 6 HOURS PRN
Status: DISCONTINUED | OUTPATIENT
Start: 2023-03-29 | End: 2023-03-29 | Stop reason: HOSPADM

## 2023-03-29 RX ADMIN — CYCLOBENZAPRINE 10 MG: 10 TABLET, FILM COATED ORAL at 16:10

## 2023-03-29 RX ADMIN — ONDANSETRON 4 MG: 2 INJECTION INTRAMUSCULAR; INTRAVENOUS at 16:10

## 2023-03-29 RX ADMIN — SODIUM CHLORIDE, POTASSIUM CHLORIDE, SODIUM LACTATE AND CALCIUM CHLORIDE 1000 ML: 600; 310; 30; 20 INJECTION, SOLUTION INTRAVENOUS at 16:03

## 2023-04-02 ENCOUNTER — HOSPITAL ENCOUNTER (OUTPATIENT)
Dept: LABOR AND DELIVERY | Facility: HOSPITAL | Age: 28
Discharge: HOME | End: 2023-04-02
Payer: COMMERCIAL

## 2023-04-02 ENCOUNTER — HOSPITAL ENCOUNTER (INPATIENT)
Facility: HOSPITAL | Age: 28
LOS: 2 days | Discharge: HOME | End: 2023-04-04
Attending: STUDENT IN AN ORGANIZED HEALTH CARE EDUCATION/TRAINING PROGRAM | Admitting: STUDENT IN AN ORGANIZED HEALTH CARE EDUCATION/TRAINING PROGRAM
Payer: COMMERCIAL

## 2023-04-02 PROBLEM — Z34.90 PREGNANT: Status: ACTIVE | Noted: 2023-04-02

## 2023-04-02 LAB
ABO GROUP BLD: NORMAL
BLD GP AB SCN SERPL QL: NEGATIVE
DEPRECATED RDW RBC AUTO: 37.6 FL (ref 37–54)
ERYTHROCYTE [DISTWIDTH] IN BLOOD BY AUTOMATED COUNT: 17.3 % (ref 12.3–15.4)
HCT VFR BLD AUTO: 32.7 % (ref 34–46.6)
HGB BLD-MCNC: 9.8 G/DL (ref 12–15.9)
MCH RBC QN AUTO: 18.5 PG (ref 26.6–33)
MCHC RBC AUTO-ENTMCNC: 29.9 G/DL (ref 31.5–35.7)
MCV RBC AUTO: 61.9 FL (ref 79–97)
PLATELET # BLD AUTO: 216 10*3/MM3 (ref 140–450)
PMV BLD AUTO: 8.6 FL (ref 6–12)
RBC # BLD AUTO: 5.29 10*6/MM3 (ref 3.77–5.28)
RH BLD: POSITIVE
T&S EXPIRATION DATE: NORMAL
WBC NRBC COR # BLD: 8 10*3/MM3 (ref 3.4–10.8)

## 2023-04-02 PROCEDURE — 86592 SYPHILIS TEST NON-TREP QUAL: CPT | Performed by: STUDENT IN AN ORGANIZED HEALTH CARE EDUCATION/TRAINING PROGRAM

## 2023-04-02 PROCEDURE — 4A1HXCZ MONITORING OF PRODUCTS OF CONCEPTION, CARDIAC RATE, EXTERNAL APPROACH: ICD-10-PCS | Performed by: STUDENT IN AN ORGANIZED HEALTH CARE EDUCATION/TRAINING PROGRAM

## 2023-04-02 PROCEDURE — 86901 BLOOD TYPING SEROLOGIC RH(D): CPT

## 2023-04-02 PROCEDURE — 86850 RBC ANTIBODY SCREEN: CPT | Performed by: STUDENT IN AN ORGANIZED HEALTH CARE EDUCATION/TRAINING PROGRAM

## 2023-04-02 PROCEDURE — 85027 COMPLETE CBC AUTOMATED: CPT | Performed by: STUDENT IN AN ORGANIZED HEALTH CARE EDUCATION/TRAINING PROGRAM

## 2023-04-02 PROCEDURE — 86901 BLOOD TYPING SEROLOGIC RH(D): CPT | Performed by: STUDENT IN AN ORGANIZED HEALTH CARE EDUCATION/TRAINING PROGRAM

## 2023-04-02 PROCEDURE — 86900 BLOOD TYPING SEROLOGIC ABO: CPT | Performed by: STUDENT IN AN ORGANIZED HEALTH CARE EDUCATION/TRAINING PROGRAM

## 2023-04-02 PROCEDURE — 3E033VJ INTRODUCTION OF OTHER HORMONE INTO PERIPHERAL VEIN, PERCUTANEOUS APPROACH: ICD-10-PCS | Performed by: STUDENT IN AN ORGANIZED HEALTH CARE EDUCATION/TRAINING PROGRAM

## 2023-04-02 PROCEDURE — 86900 BLOOD TYPING SEROLOGIC ABO: CPT

## 2023-04-02 RX ORDER — FAMOTIDINE 20 MG/1
20 TABLET, FILM COATED ORAL EVERY 12 HOURS SCHEDULED
Status: DISCONTINUED | OUTPATIENT
Start: 2023-04-02 | End: 2023-04-03

## 2023-04-02 RX ORDER — SODIUM CHLORIDE, SODIUM LACTATE, POTASSIUM CHLORIDE, CALCIUM CHLORIDE 600; 310; 30; 20 MG/100ML; MG/100ML; MG/100ML; MG/100ML
125 INJECTION, SOLUTION INTRAVENOUS CONTINUOUS
Status: DISCONTINUED | OUTPATIENT
Start: 2023-04-02 | End: 2023-04-04 | Stop reason: HOSPADM

## 2023-04-02 RX ORDER — OXYTOCIN/0.9 % SODIUM CHLORIDE 30/500 ML
2-20 PLASTIC BAG, INJECTION (ML) INTRAVENOUS
Status: DISCONTINUED | OUTPATIENT
Start: 2023-04-02 | End: 2023-04-03 | Stop reason: HOSPADM

## 2023-04-02 RX ORDER — SODIUM CHLORIDE 0.9 % (FLUSH) 0.9 %
10 SYRINGE (ML) INJECTION EVERY 12 HOURS SCHEDULED
Status: DISCONTINUED | OUTPATIENT
Start: 2023-04-02 | End: 2023-04-03 | Stop reason: HOSPADM

## 2023-04-02 RX ORDER — FAMOTIDINE 10 MG/ML
20 INJECTION, SOLUTION INTRAVENOUS EVERY 12 HOURS SCHEDULED
Status: DISCONTINUED | OUTPATIENT
Start: 2023-04-02 | End: 2023-04-03

## 2023-04-02 RX ORDER — ONDANSETRON 2 MG/ML
4 INJECTION INTRAMUSCULAR; INTRAVENOUS EVERY 6 HOURS PRN
Status: DISCONTINUED | OUTPATIENT
Start: 2023-04-02 | End: 2023-04-03 | Stop reason: HOSPADM

## 2023-04-02 RX ORDER — MAGNESIUM CARB/ALUMINUM HYDROX 105-160MG
30 TABLET,CHEWABLE ORAL ONCE
Status: DISCONTINUED | OUTPATIENT
Start: 2023-04-02 | End: 2023-04-03 | Stop reason: HOSPADM

## 2023-04-02 RX ORDER — SODIUM CHLORIDE 0.9 % (FLUSH) 0.9 %
10 SYRINGE (ML) INJECTION AS NEEDED
Status: DISCONTINUED | OUTPATIENT
Start: 2023-04-02 | End: 2023-04-03 | Stop reason: HOSPADM

## 2023-04-02 RX ORDER — ONDANSETRON 4 MG/1
4 TABLET, FILM COATED ORAL EVERY 6 HOURS PRN
Status: DISCONTINUED | OUTPATIENT
Start: 2023-04-02 | End: 2023-04-03 | Stop reason: HOSPADM

## 2023-04-02 RX ORDER — BUTORPHANOL TARTRATE 1 MG/ML
1 INJECTION, SOLUTION INTRAMUSCULAR; INTRAVENOUS EVERY 4 HOURS PRN
Status: DISCONTINUED | OUTPATIENT
Start: 2023-04-02 | End: 2023-04-03 | Stop reason: HOSPADM

## 2023-04-03 ENCOUNTER — ANESTHESIA (OUTPATIENT)
Dept: LABOR AND DELIVERY | Facility: HOSPITAL | Age: 28
End: 2023-04-03
Payer: COMMERCIAL

## 2023-04-03 ENCOUNTER — ANESTHESIA EVENT (OUTPATIENT)
Dept: LABOR AND DELIVERY | Facility: HOSPITAL | Age: 28
End: 2023-04-03
Payer: COMMERCIAL

## 2023-04-03 LAB — RPR SER QL: NORMAL

## 2023-04-03 PROCEDURE — 0KQM0ZZ REPAIR PERINEUM MUSCLE, OPEN APPROACH: ICD-10-PCS | Performed by: STUDENT IN AN ORGANIZED HEALTH CARE EDUCATION/TRAINING PROGRAM

## 2023-04-03 PROCEDURE — 10907ZC DRAINAGE OF AMNIOTIC FLUID, THERAPEUTIC FROM PRODUCTS OF CONCEPTION, VIA NATURAL OR ARTIFICIAL OPENING: ICD-10-PCS | Performed by: STUDENT IN AN ORGANIZED HEALTH CARE EDUCATION/TRAINING PROGRAM

## 2023-04-03 PROCEDURE — 25010000002 FENTANYL CITRATE (PF) 100 MCG/2ML SOLUTION: Performed by: ANESTHESIOLOGY

## 2023-04-03 RX ORDER — FERROUS SULFATE TAB EC 324 MG (65 MG FE EQUIVALENT) 324 (65 FE) MG
324 TABLET DELAYED RESPONSE ORAL
Status: DISCONTINUED | OUTPATIENT
Start: 2023-04-04 | End: 2023-04-04 | Stop reason: HOSPADM

## 2023-04-03 RX ORDER — ONDANSETRON 4 MG/1
4 TABLET, FILM COATED ORAL EVERY 8 HOURS PRN
Status: DISCONTINUED | OUTPATIENT
Start: 2023-04-03 | End: 2023-04-04 | Stop reason: HOSPADM

## 2023-04-03 RX ORDER — MISOPROSTOL 200 UG/1
800 TABLET ORAL ONCE AS NEEDED
Status: DISCONTINUED | OUTPATIENT
Start: 2023-04-03 | End: 2023-04-03 | Stop reason: HOSPADM

## 2023-04-03 RX ORDER — SODIUM CHLORIDE 0.9 % (FLUSH) 0.9 %
1-10 SYRINGE (ML) INJECTION AS NEEDED
Status: DISCONTINUED | OUTPATIENT
Start: 2023-04-03 | End: 2023-04-04 | Stop reason: HOSPADM

## 2023-04-03 RX ORDER — ACETAMINOPHEN 325 MG/1
650 TABLET ORAL EVERY 6 HOURS PRN
Status: DISCONTINUED | OUTPATIENT
Start: 2023-04-03 | End: 2023-04-04 | Stop reason: HOSPADM

## 2023-04-03 RX ORDER — FENTANYL CITRATE 50 UG/ML
INJECTION, SOLUTION INTRAMUSCULAR; INTRAVENOUS AS NEEDED
Status: DISCONTINUED | OUTPATIENT
Start: 2023-04-03 | End: 2023-04-03 | Stop reason: SURG

## 2023-04-03 RX ORDER — FAMOTIDINE 20 MG/1
20 TABLET, FILM COATED ORAL EVERY 12 HOURS PRN
Status: DISCONTINUED | OUTPATIENT
Start: 2023-04-03 | End: 2023-04-03 | Stop reason: HOSPADM

## 2023-04-03 RX ORDER — EPHEDRINE SULFATE 5 MG/ML
10 INJECTION INTRAVENOUS
Status: DISCONTINUED | OUTPATIENT
Start: 2023-04-03 | End: 2023-04-03 | Stop reason: HOSPADM

## 2023-04-03 RX ORDER — ONDANSETRON 2 MG/ML
4 INJECTION INTRAMUSCULAR; INTRAVENOUS ONCE AS NEEDED
Status: DISCONTINUED | OUTPATIENT
Start: 2023-04-03 | End: 2023-04-03 | Stop reason: HOSPADM

## 2023-04-03 RX ORDER — OXYTOCIN/0.9 % SODIUM CHLORIDE 30/500 ML
999 PLASTIC BAG, INJECTION (ML) INTRAVENOUS ONCE
Status: COMPLETED | OUTPATIENT
Start: 2023-04-03 | End: 2023-04-03

## 2023-04-03 RX ORDER — HYDROCORTISONE ACETATE PRAMOXINE HCL 2.5; 1 G/100G; G/100G
1 CREAM TOPICAL AS NEEDED
Status: DISCONTINUED | OUTPATIENT
Start: 2023-04-03 | End: 2023-04-04 | Stop reason: HOSPADM

## 2023-04-03 RX ORDER — CARBOPROST TROMETHAMINE 250 UG/ML
250 INJECTION, SOLUTION INTRAMUSCULAR
Status: DISCONTINUED | OUTPATIENT
Start: 2023-04-03 | End: 2023-04-03 | Stop reason: HOSPADM

## 2023-04-03 RX ORDER — OXYTOCIN/0.9 % SODIUM CHLORIDE 30/500 ML
250 PLASTIC BAG, INJECTION (ML) INTRAVENOUS CONTINUOUS
Status: DISPENSED | OUTPATIENT
Start: 2023-04-03 | End: 2023-04-03

## 2023-04-03 RX ORDER — PRENATAL VIT/IRON FUM/FOLIC AC 27MG-0.8MG
1 TABLET ORAL DAILY
Status: DISCONTINUED | OUTPATIENT
Start: 2023-04-03 | End: 2023-04-03

## 2023-04-03 RX ORDER — DOCUSATE SODIUM 100 MG/1
100 CAPSULE, LIQUID FILLED ORAL 2 TIMES DAILY
Status: DISCONTINUED | OUTPATIENT
Start: 2023-04-03 | End: 2023-04-04 | Stop reason: HOSPADM

## 2023-04-03 RX ORDER — PRENATAL VIT/IRON FUM/FOLIC AC 27MG-0.8MG
1 TABLET ORAL DAILY
Status: DISCONTINUED | OUTPATIENT
Start: 2023-04-03 | End: 2023-04-04 | Stop reason: HOSPADM

## 2023-04-03 RX ORDER — PANTOPRAZOLE SODIUM 40 MG/1
40 TABLET, DELAYED RELEASE ORAL 2 TIMES DAILY
Status: DISCONTINUED | OUTPATIENT
Start: 2023-04-03 | End: 2023-04-04 | Stop reason: HOSPADM

## 2023-04-03 RX ORDER — BISACODYL 10 MG
10 SUPPOSITORY, RECTAL RECTAL DAILY PRN
Status: DISCONTINUED | OUTPATIENT
Start: 2023-04-04 | End: 2023-04-04 | Stop reason: HOSPADM

## 2023-04-03 RX ORDER — FENTANYL 0.2 MG/100ML-BUPIV 0.125%-NACL 0.9% EPIDURAL INJ 2/0.125 MCG/ML-%
SOLUTION INJECTION
Status: COMPLETED
Start: 2023-04-03 | End: 2023-04-03

## 2023-04-03 RX ORDER — HYDROXYZINE HYDROCHLORIDE 25 MG/1
50 TABLET, FILM COATED ORAL NIGHTLY PRN
Status: DISCONTINUED | OUTPATIENT
Start: 2023-04-03 | End: 2023-04-04 | Stop reason: HOSPADM

## 2023-04-03 RX ORDER — ONDANSETRON 4 MG/1
4 TABLET, FILM COATED ORAL EVERY 8 HOURS PRN
Status: DISCONTINUED | OUTPATIENT
Start: 2023-04-03 | End: 2023-04-03

## 2023-04-03 RX ORDER — IBUPROFEN 600 MG/1
600 TABLET ORAL EVERY 6 HOURS PRN
Status: DISCONTINUED | OUTPATIENT
Start: 2023-04-03 | End: 2023-04-04 | Stop reason: HOSPADM

## 2023-04-03 RX ORDER — LIDOCAINE HYDROCHLORIDE AND EPINEPHRINE 15; 5 MG/ML; UG/ML
INJECTION, SOLUTION EPIDURAL AS NEEDED
Status: DISCONTINUED | OUTPATIENT
Start: 2023-04-03 | End: 2023-04-03 | Stop reason: SURG

## 2023-04-03 RX ORDER — METHYLERGONOVINE MALEATE 0.2 MG/ML
200 INJECTION INTRAVENOUS ONCE AS NEEDED
Status: DISCONTINUED | OUTPATIENT
Start: 2023-04-03 | End: 2023-04-03 | Stop reason: HOSPADM

## 2023-04-03 RX ORDER — LIDOCAINE HYDROCHLORIDE AND EPINEPHRINE 15; 5 MG/ML; UG/ML
INJECTION, SOLUTION EPIDURAL
Status: COMPLETED
Start: 2023-04-03 | End: 2023-04-03

## 2023-04-03 RX ORDER — FAMOTIDINE 10 MG/ML
20 INJECTION, SOLUTION INTRAVENOUS ONCE AS NEEDED
Status: DISCONTINUED | OUTPATIENT
Start: 2023-04-03 | End: 2023-04-03 | Stop reason: HOSPADM

## 2023-04-03 RX ORDER — FAMOTIDINE 10 MG/ML
20 INJECTION, SOLUTION INTRAVENOUS EVERY 12 HOURS PRN
Status: DISCONTINUED | OUTPATIENT
Start: 2023-04-03 | End: 2023-04-03 | Stop reason: HOSPADM

## 2023-04-03 RX ORDER — DIPHENHYDRAMINE HYDROCHLORIDE 50 MG/ML
12.5 INJECTION INTRAMUSCULAR; INTRAVENOUS EVERY 8 HOURS PRN
Status: DISCONTINUED | OUTPATIENT
Start: 2023-04-03 | End: 2023-04-03 | Stop reason: HOSPADM

## 2023-04-03 RX ORDER — FENTANYL CITRATE 50 UG/ML
INJECTION, SOLUTION INTRAMUSCULAR; INTRAVENOUS
Status: COMPLETED
Start: 2023-04-03 | End: 2023-04-03

## 2023-04-03 RX ORDER — FENTANYL 0.2 MG/100ML-BUPIV 0.125%-NACL 0.9% EPIDURAL INJ 2/0.125 MCG/ML-%
SOLUTION INJECTION CONTINUOUS
Status: DISCONTINUED | OUTPATIENT
Start: 2023-04-03 | End: 2023-04-04 | Stop reason: HOSPADM

## 2023-04-03 RX ADMIN — SODIUM CHLORIDE, POTASSIUM CHLORIDE, SODIUM LACTATE AND CALCIUM CHLORIDE 125 ML/HR: 600; 310; 30; 20 INJECTION, SOLUTION INTRAVENOUS at 00:30

## 2023-04-03 RX ADMIN — Medication 10 ML/HR: at 02:16

## 2023-04-03 RX ADMIN — Medication 999 ML/HR: at 12:00

## 2023-04-03 RX ADMIN — IBUPROFEN 600 MG: 600 TABLET, FILM COATED ORAL at 15:46

## 2023-04-03 RX ADMIN — DOCUSATE SODIUM 100 MG: 100 CAPSULE, LIQUID FILLED ORAL at 20:21

## 2023-04-03 RX ADMIN — Medication 1 APPLICATION: at 15:46

## 2023-04-03 RX ADMIN — PANTOPRAZOLE SODIUM 40 MG: 40 TABLET, DELAYED RELEASE ORAL at 20:21

## 2023-04-03 RX ADMIN — Medication: at 10:38

## 2023-04-03 RX ADMIN — WITCH HAZEL 1 PAD: 500 SOLUTION RECTAL; TOPICAL at 15:46

## 2023-04-03 RX ADMIN — FENTANYL CITRATE 25 MCG: 50 INJECTION, SOLUTION INTRAMUSCULAR; INTRAVENOUS at 02:06

## 2023-04-03 RX ADMIN — IBUPROFEN 600 MG: 600 TABLET, FILM COATED ORAL at 23:42

## 2023-04-03 RX ADMIN — PRENATAL VITAMINS-IRON FUMARATE 27 MG IRON-FOLIC ACID 0.8 MG TABLET 1 TABLET: at 15:46

## 2023-04-03 RX ADMIN — Medication 2 MILLI-UNITS/MIN: at 00:31

## 2023-04-03 RX ADMIN — LIDOCAINE HYDROCHLORIDE,EPINEPHRINE BITARTRATE 5 ML: 15; .005 INJECTION, SOLUTION EPIDURAL; INFILTRATION; INTRACAUDAL; PERINEURAL at 02:08

## 2023-04-03 NOTE — PLAN OF CARE
Goal Outcome Evaluation:      Pt delivered  with 2nd degree laceration with repair. Recovery initiated after repair. Denies any pain,  placed skin to skin after nursery/nurse practitioner examined at [facility].  well w/o difficulty latched to right breast w/o assistance. Will continue to monitor and transfer to postpartum. Pt voided w/o difficulty, ambulating to br w/o assist. Voided 600cc, jordan-care explained. Pt verbalized understanding.

## 2023-04-03 NOTE — ANESTHESIA PROCEDURE NOTES
CSE Block      Patient reassessed immediately prior to procedure    Patient location during procedure: OB  Reason for block: procedure for pain, at surgeon's request and labor pain  Staffing  Anesthesiologist: Messi Olsen MD  Preanesthetic Checklist  Completed: patient identified, IV checked, site marked, risks and benefits discussed, surgical consent, monitors and equipment checked, pre-op evaluation and timeout performed  CSE  Patient position: sitting  Prep: ChloraPrep  Patient monitoring: blood pressure monitoring, continuous pulse oximetry and EKG  Procedures: landmark technique  Spinal Needle  Needle type: Giles   Needle gauge: 27 G  Approach: midline  Location: L4-5  Fluid Appearance: clear    Epidural Needle  Injection technique: ALEJANDRO saline  Needle type: Tuohy   Needle gauge: 17 G  Location: L4-L5  Loss of Resistance: 7cm  Cath Depth at Skin (cm): 14  Aspiration: negative  Test dose: negative      Catheter  Catheter type: end hole  Catheter size: 19 G  Assessment  Dressing:secured with tape  Pt Tolerance:patient tolerated the procedure well with no apparent complications  Complications:no  Additional Notes  Pre-procedure:  CSE performed at the request of the patient and the obstetrician for the management of acute obstetric pain; patient identified; pre-procedure vital signs, all relevant labs/studies, complete medical/surgical/anesthetic history, full medication list, full allergy list, and NPO status obtained/reviewed; physical assessment performed; anesthetic options, side effects, potential complications, risks, and benefits discussed; questions answered; patient wishes to proceed with the procedure; written anesthesia procedure consent obtained; patient cleared for procedure; time out performed; IV access in situ    Procedure:  ASA monitors placed; patient positioned; hand hygiene performed; sterile technique maintained throughout the procedure; sterile prep and drape applied; insertion  site determined by anatomical landmarks and palpation; skin and subcutaneous tissues numbed by injection of 1% lidocaine; epidural needle placed into the skin and advanced into the epidural space with correct needle placement confirmed by using a loss-of-resistance technique using a Luer-slip loss of resistance glass syringe filled with saline; spinal needle inserted through epidural needle and advanced into the intrathecal space with correct needle placement confirmed by aspiration of cerebrospinal fluid; medication injected intrathecally; spinal needle withdrawn; epidural catheter inserted through the epidural needle and threaded into the epidural space; epidural needle removed over the epidural catheter; epidural catheter secured firmly in place with epidural pad and tape; filter applied to epidural catheter; aspiration through epidural catheter was negative for blood or CSE; lidocaine 1.5% + epinephrine 1:200,000 was administered epidurally as a test dose; test dose was negative for intravascular or intrathecal injection; continuous epidural infusion was started; patient controlled epidural analgesia bolus doses available    Post-procedure:  CSE placed successfully; good block; no apparent complications; minimal estimated blood loss; vital signs stable throughout; see nurse's notes for vitals; will follow patient

## 2023-04-03 NOTE — PLAN OF CARE
Goal Outcome Evaluation:      Pt has no c/o pain after epidural, tracing has been cat I. Last vse 4cm, 75%, -2. No concerns at this time.

## 2023-04-03 NOTE — H&P
CHEYANNE Nelson  Obstetric History and Physical     Chief Complaint: induction of labor at term     Subjective     Patient is a 27 y.o. female  ( X 3, Ectopic, and spontaneous ) currently at 39+1 , who presents for IOL at term.     Her prenatal care is c/b multiparous and intermittent decreased fetal movement.  Her previous obstetric/gynecological history is noted for is non-contributory.      Prenatal Information:  See office H&P for full details and labs.      Past OB History:       OB History    Para Term  AB Living   6 3 3 0 2 3   SAB IAB Ectopic Molar Multiple Live Births   1 0 1 0 0 3               Past Medical History: Past Medical History:   Diagnosis Date   • Ectopic pregnancy         Past Surgical History Past Surgical History:   Procedure Laterality Date   • CHOLECYSTECTOMY     • ECTOPIC PREGNANCY  2019   • TEAR DUCT SURGERY Bilateral     as a baby   • TONSILLECTOMY      young child        Family History: Family History   Problem Relation Age of Onset   • Hypertension Father    • Diabetes Father    • Cancer Maternal Grandmother    • Cancer Maternal Grandfather       Social History:  reports that she quit smoking about 7 years ago. Her smoking use included cigarettes. She has a 1.00 pack-year smoking history. She does not have any smokeless tobacco history on file.   reports that she does not currently use alcohol.   reports no history of drug use.        General ROS: Pertinent items are noted in HPI    Objective      Vitals:   There were no vitals filed for this visit.    Fetal Heart Rate Assessment:   Category 1-2, having intermittent variable decelerations    Emhouse:   q 2     Physical Exam:     General Appearance:    Alert, cooperative, in no acute distress   Abdomen:     Soft, non-tender, no guarding, no rebound tenderness,       EFW 7#0oz - 7#8oz   Pelvic Exam:    Pelvis adequate.    Presentation: Vertex    Cervix: pending on admission   Extremities:   Moves all  extremities well, no edema, no cyanosis, no              redness   Skin:   No bleeding, bruising or rash   Neurologic:   No focal neurologic defect          Laboratory Results:   Lab Results (last 48 hours)     ** No results found for the last 48 hours. **             Assessment & Plan     Active Problems:    * No active hospital problems. *         Assessment:  1.  Intrauterine pregnancy at 39+0 wks gestation.    2.  Induction of labor due to elective induction at term with intermittent decreased fetal movement  3.  GBS status:Negative    Plan:  1. Induction initiated with pitocin.  Amniotomy performed approx 0920 with moderate fluid, given IUPC due to variable decelerations and fluid on amniotomy.  EFW 7#, vaginal delivery anticipated.   2. Plan of care has been reviewed with patient.  3.  Risks, benefits of treatment plan have been discussed.  4.  All questions have been answered.  5.. Desires permanent sterilization, counseled on r/b/a including other reversible options such as LARC including nexplanon and intrauterine device, also counseled on partner obtaining vasectomy.   Plan for interval tubal .         Fany Parra MD   4/3/2023   09:23 EDT

## 2023-04-03 NOTE — L&D DELIVERY NOTE
[facility]  Vaginal Delivery Note    Diagnosis     Patient is a 27 y.o. female  currently at 39w1d, who presented for induction of labor at term.       Delivery     Delivery:  Spontaneous Vaginal Delivery    Date of Delivery:  4/3/2023 at 1156   Anesthesia:  Epidural   Delivering clinician: Fany Parra MD      Delivery narrative:  Patient presented for induction of labor at term. Induction initiated with pitocin overnight. Had AROM around 0920 with moderate fluid. Progressed along normal labor curve with mostly Cat I tracing with intermittent variable decelerations, was completely dilated around 1130,. Pushed for approximately 25 minutes prior to delivery at 1156. She delivered a LVFI in a clean field, position direct OA. Infants head delivered atraumatically, followed by delivery of the rest of the infants body. There were no nuchal cords. Cord was clamped and cut and infant was passed to mothers abdomen. Infant had good cry, color, and tone, and was moving all 4 extremities. Cord blood was obtained and sent for analysis. Placenta was delivered spontaneously and intact with a 3 vessel cord. Pitocin was given IV and fundal massage was applied for hemostasis.  The vagina and rectum were examined and there was a second degree laceration repaired in usual fashion. Good hemostasis noted following delivery and repair.       Infant    Findings: VFI     Apgars: 8 & 9 at 1 and 5 minutes.      Placenta, Cord, and Fluid     Delayed cord clamping performed per routine.       Placenta delivered spontaneous, intact  3VC      Bimanual massage and Pitocin 30 units in 500 mL bolus given after placental delivery.  There was good hemostasis and a firm uterine tone.        Lacerations       had a 2nd degree laceration, repaired c 3.0 vicryl rapide in the usual fashion.    Quantitiative Blood Loss  100  mL         Disposition  Mother to Mother Baby/Postpartum  in stable condition currently.  Baby to remains with mom  in stable  condition currently.      Fany Parra MD  04/03/23  12:14 EDT

## 2023-04-03 NOTE — ANESTHESIA PREPROCEDURE EVALUATION
Anesthesia Evaluation     Patient summary reviewed and Nursing notes reviewed   no history of anesthetic complications:  NPO Solid Status: N/A  NPO Liquid Status: N/A           Airway   Dental      Pulmonary    (+) a smoker Former,   Cardiovascular     ECG reviewed        Neuro/Psych  GI/Hepatic/Renal/Endo    (+) obesity,       Musculoskeletal     (+) back pain,   Abdominal    Substance History      OB/GYN    (+) Pregnant,         Other        ROS/Med Hx Other: Additional History:  Chest pain, palpitations    PSH:  TONSILLECTOMY TEAR DUCT SURGERY  CHOLECYSTECTOMY ECTOPIC PREGNANCY                  Anesthesia Plan    ASA 2     CSE     (Patient identified; pre-operative vital signs, all relevant labs/studies, complete medical/surgical/anesthetic history, full medication list, full allergy list, and NPO status obtained/reviewed; physical assessment performed; anesthetic options, side effects, potential complications, risks, and benefits discussed; questions answered; written anesthesia consent obtained; patient cleared for procedure; anesthesia machine and equipment checked and functioning)    Anesthetic plan, risks, benefits, and alternatives have been provided, discussed and informed consent has been obtained with: patient.        CODE STATUS:

## 2023-04-03 NOTE — PROGRESS NOTES
Was called to attend birth. Baby came out vigorous and minimal NRP was performed.    Aria Ellis, RRT   03/29/2023

## 2023-04-04 VITALS
WEIGHT: 175.71 LBS | BODY MASS INDEX: 32.33 KG/M2 | OXYGEN SATURATION: 98 % | HEIGHT: 62 IN | HEART RATE: 104 BPM | RESPIRATION RATE: 18 BRPM | TEMPERATURE: 97.9 F | SYSTOLIC BLOOD PRESSURE: 114 MMHG | DIASTOLIC BLOOD PRESSURE: 74 MMHG

## 2023-04-04 LAB
ANISOCYTOSIS BLD QL: NORMAL
BASOPHILS # BLD AUTO: 0 10*3/MM3 (ref 0–0.2)
BASOPHILS NFR BLD AUTO: 0.2 % (ref 0–1.5)
DEPRECATED RDW RBC AUTO: 38.9 FL (ref 37–54)
EOSINOPHIL # BLD AUTO: 0.1 10*3/MM3 (ref 0–0.4)
EOSINOPHIL NFR BLD AUTO: 0.7 % (ref 0.3–6.2)
ERYTHROCYTE [DISTWIDTH] IN BLOOD BY AUTOMATED COUNT: 17.5 % (ref 12.3–15.4)
HCT VFR BLD AUTO: 28.4 % (ref 34–46.6)
HGB BLD-MCNC: 8.4 G/DL (ref 12–15.9)
LARGE PLATELETS: NORMAL
LYMPHOCYTES # BLD AUTO: 2.9 10*3/MM3 (ref 0.7–3.1)
LYMPHOCYTES NFR BLD AUTO: 24.8 % (ref 19.6–45.3)
MCH RBC QN AUTO: 18.6 PG (ref 26.6–33)
MCHC RBC AUTO-ENTMCNC: 29.6 G/DL (ref 31.5–35.7)
MCV RBC AUTO: 62.8 FL (ref 79–97)
MICROCYTES BLD QL: NORMAL
MONOCYTES # BLD AUTO: 0.8 10*3/MM3 (ref 0.1–0.9)
MONOCYTES NFR BLD AUTO: 6.7 % (ref 5–12)
NEUTROPHILS NFR BLD AUTO: 67.6 % (ref 42.7–76)
NEUTROPHILS NFR BLD AUTO: 7.8 10*3/MM3 (ref 1.7–7)
NRBC BLD AUTO-RTO: 0 /100 WBC (ref 0–0.2)
PLATELET # BLD AUTO: 189 10*3/MM3 (ref 140–450)
PMV BLD AUTO: 8.8 FL (ref 6–12)
POIKILOCYTOSIS BLD QL SMEAR: NORMAL
RBC # BLD AUTO: 4.52 10*6/MM3 (ref 3.77–5.28)
WBC MORPH BLD: NORMAL
WBC NRBC COR # BLD: 11.6 10*3/MM3 (ref 3.4–10.8)

## 2023-04-04 PROCEDURE — 85007 BL SMEAR W/DIFF WBC COUNT: CPT | Performed by: STUDENT IN AN ORGANIZED HEALTH CARE EDUCATION/TRAINING PROGRAM

## 2023-04-04 PROCEDURE — 85025 COMPLETE CBC W/AUTO DIFF WBC: CPT | Performed by: STUDENT IN AN ORGANIZED HEALTH CARE EDUCATION/TRAINING PROGRAM

## 2023-04-04 RX ORDER — IBUPROFEN 600 MG/1
600 TABLET ORAL EVERY 6 HOURS PRN
Qty: 60 TABLET | Refills: 1 | Status: SHIPPED | OUTPATIENT
Start: 2023-04-04

## 2023-04-04 RX ADMIN — PRENATAL VITAMINS-IRON FUMARATE 27 MG IRON-FOLIC ACID 0.8 MG TABLET 1 TABLET: at 08:30

## 2023-04-04 RX ADMIN — IBUPROFEN 600 MG: 600 TABLET, FILM COATED ORAL at 12:22

## 2023-04-04 RX ADMIN — DOCUSATE SODIUM 100 MG: 100 CAPSULE, LIQUID FILLED ORAL at 08:30

## 2023-04-04 RX ADMIN — FERROUS SULFATE TAB EC 324 MG (65 MG FE EQUIVALENT) 324 MG: 324 (65 FE) TABLET DELAYED RESPONSE at 08:30

## 2023-04-04 RX ADMIN — IBUPROFEN 600 MG: 600 TABLET, FILM COATED ORAL at 05:49

## 2023-04-04 NOTE — LACTATION NOTE
"This note was copied from a baby's chart.  Pt denies hx of breast surgery, no allergy to wool or foods. Medela gel patches provided, instructed on use.   States she has bf her all her children up to 3 mo. She has a Zomee breast pump at home. Plans to return to work in 6 weeks. 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 Observed feeding baby in rt cradle hold, baby latching, nursing off & on at this time. Mom states she is confident bf, \"this baby is doing better than all my children\" Will call for help as needed.   "

## 2023-04-04 NOTE — PLAN OF CARE
Goal Outcome Evaluation:  Plan of Care Reviewed With: patient        Progress: improving   Lochia WNL, v/s WNL, voiding appropriately. Pain managed with motrin as needed. BF well, caring for infant independently.

## 2023-04-04 NOTE — ANESTHESIA POSTPROCEDURE EVALUATION
Patient: Myrtle Clifford    Procedure Summary     Date: 04/03/23 Room / Location:     Anesthesia Start: 0150 Anesthesia Stop: 1156    Procedure: LABOR ANALGESIA Diagnosis:     Scheduled Providers:  Provider: Messi Olsen MD    Anesthesia Type: CSE ASA Status: 2          Anesthesia Type: CSE    Vitals  Vitals Value Taken Time   /71 04/04/23 0350   Temp 97.9 °F (36.6 °C) 04/04/23 0350   Pulse 80 04/04/23 0350   Resp 16 04/04/23 0350   SpO2 97 % 04/04/23 0350           Post Anesthesia Care and Evaluation    Patient location during evaluation: bedside  Patient participation: complete - patient participated  Level of consciousness: awake  Pain scale: See nurse's notes for pain score.  Pain management: adequate    Airway patency: patent  Anesthetic complications: No anesthetic complications  PONV Status: none  Cardiovascular status: acceptable  Respiratory status: acceptable and spontaneous ventilation  Hydration status: acceptable  Post Neuraxial Block status: Motor and sensory function returned to baseline and No signs or symptoms of PDPH  Comments: Patient seen and examined postoperatively; vital signs stable; SpO2 greater than or equal to 90%; cardiopulmonary status stable; nausea/vomiting adequately controlled; pain adequately controlled; no apparent anesthesia complications; patient discharged from anesthesia care when discharge criteria were met

## 2023-04-04 NOTE — DISCHARGE SUMMARY
[facility]  Delivery Discharge Summary    Primary OB Clinician: Fany Parra MD    Admission Diagnosis:  Principal Problem:    Pregnant      Discharge Diagnosis:  delivered    Gestational Age: 39w1d    Date of Delivery: 4/3/2023     Delivered By:  Fany Parra     Delivery Type: Vaginal, Spontaneous      Tubal Ligation: n/a    Intrapartum Course: Uncomplicated delivery.     Postpartum Course:  Pt was admitted and underwent  Spontaneous Vaginal Delivery. Pt was transferred to PP where she had an uncomplicated course. Pt remained AFVSS, had scant lochia and pain was well controlled. Pt d/c home in stable condition and will f/u in office for PP visit as scheduled or PRN. Currently breastfeeding. Plans on IUD  for contraception. Anemia s/p delivery, asymptomatic. To continue FeSO4 as directed.     Physical Exam:    Vitals:   Vitals:    238 23 2342 23 0350 23 0840   BP: 114/77 114/63 107/71 114/74   BP Location: Left arm Left arm Left arm Left arm   Patient Position: Sitting Lying Lying Sitting   Pulse: 87 99 80 104   Resp: 16 16 16 18   Temp: 97.4 °F (36.3 °C) 97.8 °F (36.6 °C) 97.9 °F (36.6 °C)    TempSrc: Oral Oral Oral    SpO2: 98% 99% 97% 98%   Weight:       Height:         Temp (24hrs), Av.1 °F (36.7 °C), Min:97.4 °F (36.3 °C), Max:98.5 °F (36.9 °C)      General Appearance:    Alert, cooperative, in no acute distress   Abdomen:     Soft non-tender, non-distended, no guarding, no rebound         tenderness.   Extremities:   Moves all extremities well, no edema, no cyanosis, no              Redness.   Incision:  N/A   Fundus:   Firm, below umbilicus     Feeding method: Breastfeeding Status: Yes    Labs:  Results from last 7 days   Lab Units 23  0741 23  2125   WBC 10*3/mm3 11.60* 8.00   HEMOGLOBIN g/dL 8.4* 9.8*   HEMATOCRIT % 28.4* 32.7*   PLATELETS 10*3/mm3 189 216           Blood Type: RH Positive      Plan:  Discharge to home.    Follow-up appointment with Dr Parra in  6 weeks.    Christelle Purvis NP  4/4/2023  09:23 EDT

## 2023-04-05 NOTE — SIGNIFICANT NOTE
Case Management Discharge Note      Final Note: (P) home         Selected Continued Care - Discharged on 4/4/2023 Admission date: 4/2/2023 - Discharge disposition: Home or Self Care              Transportation Services  Private: Car    Final Discharge Disposition Code: (P) 01 - home or self-care

## 2023-04-06 NOTE — PAYOR COMM NOTE
This is clinical for Myrtle Clifford   Reference/Auth#: [id number]    AUTHORIZATION PENDING:     Please call or fax determination to contact below.   Thank you.    Kanika Vanegas RN, BSN  Utilization Review Nurse  [facility]  Direct & confidential phone # [phone]  Fax # [phone]    Vaginal Birth RRG Clinical Indications for Procedure and Care      Criteria Review   Vaginal Birth RRG Clinical Indications for Procedure and Care     Overall Determination: Indications Met     Criteria:  [×] Procedure is indicated for  1 or more  of the following :      [×] Full-term gestation (39 weeks or beyond) and elective induction preferred (as opposed to waiting for spontaneous labor) [B]     Notes:  -- 2023  3:34 PM by Kanika Vanegas RN --      Delivery Record:            Delivery date and time: 4/3/23 @ 1156            Gestational age: 39+1 weeks.            /Para/Ab:             Sex: female            Birth weight: 3912 grams            Birth length: 20.5 inches            Apgars: 8/9            Delivery type: Vaginal        -- 4/3/2023  4:31 PM by Kanika Vanegas RN --      VERIFIED INPT ORDER PLACED 23. INDUCTION OF LABOR ON 4/3 WITH VAGINAL DELIVERY ON 4/3/23 @ 1156. 39+1 WKS GESTATION.       Myrtle Clifford (27 y.o. Female)     Date of Birth   1995    Social Security Number       Address   [address] [place] [address] [place] [address]    Home Phone   [phone]    N   [id number]       Jewish   None    Marital Status                               Admission Date   23    Admission Type   Elective    Admitting Provider   Fany Parra MD    Attending Provider       Department, Room/Bed   [facility] MOTHER BABY, M401/1       Discharge Date   2023    Discharge Disposition   Home or Self Care    Discharge Destination                               Attending Provider: (none)   Allergies: Bactrim [Sulfamethoxazole-trimethoprim]     "Isolation: None   Infection: None   Code Status: Prior    Ht: 157.5 cm (62\")   Wt: 79.7 kg (175 lb 11.3 oz)    Admission Cmt: None   Principal Problem: Pregnant [Z34.90]                 Active Insurance as of 2023     Primary Coverage     Payor Plan Insurance Group Employer/Plan Group    HUMANA HUMANA [id number]     Payor Plan Address Payor Plan Phone Number Payor Plan Fax Number Effective Dates    [address] [address] [phone]  2019 - None Entered    [facility] [phone]       Subscriber Name Subscriber Birth Date Member ID       STEFANIE BOUDREAUX 1967 [id number]           Secondary Coverage     Payor Plan Insurance Group Employer/Plan Group    S -[place] [employer] [facility]      Payor Plan Address Payor Plan Phone Number Payor Plan Fax Number Effective Dates    [address]   2023 - None Entered    [facility] [address]       Subscriber Name Subscriber Birth Date Member ID       KHALIDASTEPHANIE L 1995 [id number]                 Emergency Contacts      (Rel.) Home Phone Work Phone Mobile Phone    LIVIER GAXIOLA (Spouse) [phone] -- [phone]               History & Physical      H&P signed by [employer], [place] at 23 0826       [Media Unavailable] Scan on 2023 by [employer], [place]: [facility], 2022-2023          Electronically signed by [employer], [place] at 23 0826          Operative/Procedure Notes (last 7 days)      Fany Parra MD at 23 1214           Omar  Vaginal Delivery Note    Diagnosis     Patient is a 27 y.o. female  currently at 39w1d, who presented for induction of labor at term.       Delivery     Delivery:  Spontaneous Vaginal Delivery    Date of Delivery:  4/3/2023 at 1156   Anesthesia:  Epidural   Delivering clinician: Fany Parra MD      Delivery narrative:  Patient presented for induction of labor at term. Induction initiated with pitocin overnight. Had AROM around 0920 with " moderate fluid. Progressed along normal labor curve with mostly Cat I tracing with intermittent variable decelerations, was completely dilated around 1130,. Pushed for approximately 25 minutes prior to delivery at 1156. She delivered a LVFI in a clean field, position direct OA. Infants head delivered atraumatically, followed by delivery of the rest of the infants body. There were no nuchal cords. Cord was clamped and cut and infant was passed to mothers abdomen. Infant had good cry, color, and tone, and was moving all 4 extremities. Cord blood was obtained and sent for analysis. Placenta was delivered spontaneously and intact with a 3 vessel cord. Pitocin was given IV and fundal massage was applied for hemostasis.  The vagina and rectum were examined and there was a second degree laceration repaired in usual fashion. Good hemostasis noted following delivery and repair.       Infant    Findings: VFI     Apgars: 8 & 9 at 1 and 5 minutes.      Placenta, Cord, and Fluid     Delayed cord clamping performed per routine.       Placenta delivered spontaneous, intact  3VC      Bimanual massage and Pitocin 30 units in 500 mL bolus given after placental delivery.  There was good hemostasis and a firm uterine tone.        Lacerations       had a 2nd degree laceration, repaired c 3.0 vicryl rapide in the usual fashion.    Quantitiative Blood Loss  100  mL         Disposition  Mother to Mother Baby/Postpartum  in stable condition currently.  Baby to remains with mom  in stable condition currently.      Fany Parra MD  04/03/23  12:14 EDT          Electronically signed by Fany Parra MD at 04/03/23 1218       Physician Progress Notes (last 7 days)  Notes from 03/30/23 1536 through 04/06/23 1536   No notes of this type exist for this encounter.            Discharge Summary      Christelle Purvis NP at 04/04/23 0923     Attestation signed by Blanca Ngo MD at 04/06/23 0683    I have reviewed this documentation  and agree.                  [facility]  Delivery Discharge Summary    Primary OB Clinician: Fany Parra MD    Admission Diagnosis:  Principal Problem:    Pregnant      Discharge Diagnosis:  delivered    Gestational Age: 39w1d    Date of Delivery: 4/3/2023     Delivered By:  Fany Parra     Delivery Type: Vaginal, Spontaneous      Tubal Ligation: n/a    Intrapartum Course: Uncomplicated delivery.     Postpartum Course:  Pt was admitted and underwent  Spontaneous Vaginal Delivery. Pt was transferred to PP where she had an uncomplicated course. Pt remained AFVSS, had scant lochia and pain was well controlled. Pt d/c home in stable condition and will f/u in office for PP visit as scheduled or PRN. Currently breastfeeding. Plans on IUD  for contraception. Anemia s/p delivery, asymptomatic. To continue FeSO4 as directed.     Physical Exam:    Vitals:   Vitals:    23 2342 23 0350 23 0840   BP: 114/77 114/63 107/71 114/74   BP Location: Left arm Left arm Left arm Left arm   Patient Position: Sitting Lying Lying Sitting   Pulse: 87 99 80 104   Resp: 16 16 16 18   Temp: 97.4 °F (36.3 °C) 97.8 °F (36.6 °C) 97.9 °F (36.6 °C)    TempSrc: Oral Oral Oral    SpO2: 98% 99% 97% 98%   Weight:       Height:         Temp (24hrs), Av.1 °F (36.7 °C), Min:97.4 °F (36.3 °C), Max:98.5 °F (36.9 °C)      General Appearance:    Alert, cooperative, in no acute distress   Abdomen:     Soft non-tender, non-distended, no guarding, no rebound         tenderness.   Extremities:   Moves all extremities well, no edema, no cyanosis, no              Redness.   Incision:  N/A   Fundus:   Firm, below umbilicus     Feeding method: Breastfeeding Status: Yes    Labs:  Results from last 7 days   Lab Units 23  0741 23  2125   WBC 10*3/mm3 11.60* 8.00   HEMOGLOBIN g/dL 8.4* 9.8*   HEMATOCRIT % 28.4* 32.7*   PLATELETS 10*3/mm3 189 216           Blood Type: RH Positive      Plan:  Discharge to home.    Follow-up  appointment with Dr Parra in 6 weeks.    Christelle Purvis NP  4/4/2023  09:23 EDT      Electronically signed by Blanca Ngo MD at 04/06/23 1268

## 2023-06-26 PROBLEM — L73.2 HIDRADENITIS SUPPURATIVA: Status: RESOLVED | Noted: 2023-06-26 | Resolved: 2023-06-26

## 2023-06-26 PROBLEM — L02.91 ABSCESS: Status: ACTIVE | Noted: 2023-06-26

## 2023-06-26 PROBLEM — L98.9 SKIN LESION: Status: ACTIVE | Noted: 2023-06-26

## 2023-06-26 PROBLEM — L73.2 HIDRADENITIS SUPPURATIVA: Status: ACTIVE | Noted: 2023-06-26

## 2023-08-01 ENCOUNTER — LAB (OUTPATIENT)
Dept: LAB | Facility: HOSPITAL | Age: 28
End: 2023-08-01
Payer: MEDICAID

## 2023-08-01 LAB
DEPRECATED RDW RBC AUTO: 32.9 FL (ref 37–54)
ERYTHROCYTE [DISTWIDTH] IN BLOOD BY AUTOMATED COUNT: 14 % (ref 12.3–15.4)
HCT VFR BLD AUTO: 34.2 % (ref 34–46.6)
HGB BLD-MCNC: 10.4 G/DL (ref 12–15.9)
MCH RBC QN AUTO: 20.5 PG (ref 26.6–33)
MCHC RBC AUTO-ENTMCNC: 30.4 G/DL (ref 31.5–35.7)
MCV RBC AUTO: 67.5 FL (ref 79–97)
PLATELET # BLD AUTO: 378 10*3/MM3 (ref 140–450)
PMV BLD AUTO: 9.9 FL (ref 6–12)
RBC # BLD AUTO: 5.07 10*6/MM3 (ref 3.77–5.28)
WBC NRBC COR # BLD: 6.19 10*3/MM3 (ref 3.4–10.8)

## 2023-08-01 PROCEDURE — 85027 COMPLETE CBC AUTOMATED: CPT | Performed by: STUDENT IN AN ORGANIZED HEALTH CARE EDUCATION/TRAINING PROGRAM

## 2023-08-01 PROCEDURE — 36415 COLL VENOUS BLD VENIPUNCTURE: CPT | Performed by: STUDENT IN AN ORGANIZED HEALTH CARE EDUCATION/TRAINING PROGRAM

## 2023-08-11 ENCOUNTER — ANESTHESIA EVENT (OUTPATIENT)
Dept: PERIOP | Facility: HOSPITAL | Age: 28
End: 2023-08-11
Payer: MEDICAID

## 2023-08-11 ENCOUNTER — HOSPITAL ENCOUNTER (OUTPATIENT)
Facility: HOSPITAL | Age: 28
Setting detail: HOSPITAL OUTPATIENT SURGERY
Discharge: HOME | End: 2023-08-11
Attending: STUDENT IN AN ORGANIZED HEALTH CARE EDUCATION/TRAINING PROGRAM | Admitting: STUDENT IN AN ORGANIZED HEALTH CARE EDUCATION/TRAINING PROGRAM
Payer: MEDICAID

## 2023-08-11 ENCOUNTER — ANESTHESIA (OUTPATIENT)
Dept: PERIOP | Facility: HOSPITAL | Age: 28
End: 2023-08-11
Payer: MEDICAID

## 2023-08-11 VITALS
WEIGHT: 163 LBS | RESPIRATION RATE: 16 BRPM | BODY MASS INDEX: 30 KG/M2 | HEIGHT: 62 IN | SYSTOLIC BLOOD PRESSURE: 106 MMHG | DIASTOLIC BLOOD PRESSURE: 70 MMHG | TEMPERATURE: 97.9 F | OXYGEN SATURATION: 96 % | HEART RATE: 73 BPM

## 2023-08-11 DIAGNOSIS — L98.9 SKIN LESION: Primary | ICD-10-CM

## 2023-08-11 LAB — B-HCG UR QL: NEGATIVE

## 2023-08-11 PROCEDURE — 25010000002 CEFAZOLIN PER 500 MG: Performed by: STUDENT IN AN ORGANIZED HEALTH CARE EDUCATION/TRAINING PROGRAM

## 2023-08-11 PROCEDURE — 25010000002 DEXAMETHASONE PER 1 MG: Performed by: NURSE ANESTHETIST, CERTIFIED REGISTERED

## 2023-08-11 PROCEDURE — 11403 EXC TR-EXT B9+MARG 2.1-3CM: CPT | Performed by: STUDENT IN AN ORGANIZED HEALTH CARE EDUCATION/TRAINING PROGRAM

## 2023-08-11 PROCEDURE — 25010000002 KETOROLAC TROMETHAMINE PER 15 MG: Performed by: NURSE ANESTHETIST, CERTIFIED REGISTERED

## 2023-08-11 PROCEDURE — 25010000002 FENTANYL CITRATE (PF) 100 MCG/2ML SOLUTION: Performed by: NURSE ANESTHETIST, CERTIFIED REGISTERED

## 2023-08-11 PROCEDURE — 88305 TISSUE EXAM BY PATHOLOGIST: CPT | Performed by: STUDENT IN AN ORGANIZED HEALTH CARE EDUCATION/TRAINING PROGRAM

## 2023-08-11 PROCEDURE — 81025 URINE PREGNANCY TEST: CPT | Performed by: STUDENT IN AN ORGANIZED HEALTH CARE EDUCATION/TRAINING PROGRAM

## 2023-08-11 PROCEDURE — 25010000002 PROPOFOL 1000 MG/100ML EMULSION: Performed by: NURSE ANESTHETIST, CERTIFIED REGISTERED

## 2023-08-11 PROCEDURE — 0 MEPERIDINE PER 100 MG: Performed by: NURSE ANESTHETIST, CERTIFIED REGISTERED

## 2023-08-11 PROCEDURE — S0260 H&P FOR SURGERY: HCPCS | Performed by: STUDENT IN AN ORGANIZED HEALTH CARE EDUCATION/TRAINING PROGRAM

## 2023-08-11 PROCEDURE — 25010000002 ONDANSETRON PER 1 MG: Performed by: NURSE ANESTHETIST, CERTIFIED REGISTERED

## 2023-08-11 PROCEDURE — 25010000002 MIDAZOLAM PER 1 MG: Performed by: NURSE ANESTHETIST, CERTIFIED REGISTERED

## 2023-08-11 RX ORDER — IPRATROPIUM BROMIDE AND ALBUTEROL SULFATE 2.5; .5 MG/3ML; MG/3ML
3 SOLUTION RESPIRATORY (INHALATION) ONCE AS NEEDED
Status: DISCONTINUED | OUTPATIENT
Start: 2023-08-11 | End: 2023-08-11 | Stop reason: HOSPADM

## 2023-08-11 RX ORDER — PROCHLORPERAZINE EDISYLATE 5 MG/ML
10 INJECTION INTRAMUSCULAR; INTRAVENOUS ONCE AS NEEDED
Status: DISCONTINUED | OUTPATIENT
Start: 2023-08-11 | End: 2023-08-11 | Stop reason: HOSPADM

## 2023-08-11 RX ORDER — DEXAMETHASONE SODIUM PHOSPHATE 4 MG/ML
INJECTION, SOLUTION INTRA-ARTICULAR; INTRALESIONAL; INTRAMUSCULAR; INTRAVENOUS; SOFT TISSUE AS NEEDED
Status: DISCONTINUED | OUTPATIENT
Start: 2023-08-11 | End: 2023-08-11 | Stop reason: SURG

## 2023-08-11 RX ORDER — PROMETHAZINE HYDROCHLORIDE 25 MG/1
25 TABLET ORAL ONCE AS NEEDED
Status: DISCONTINUED | OUTPATIENT
Start: 2023-08-11 | End: 2023-08-11 | Stop reason: HOSPADM

## 2023-08-11 RX ORDER — MEPERIDINE HYDROCHLORIDE 25 MG/ML
12.5 INJECTION INTRAMUSCULAR; INTRAVENOUS; SUBCUTANEOUS
Status: DISCONTINUED | OUTPATIENT
Start: 2023-08-11 | End: 2023-08-11 | Stop reason: HOSPADM

## 2023-08-11 RX ORDER — SODIUM CHLORIDE 0.9 % (FLUSH) 0.9 %
10 SYRINGE (ML) INJECTION AS NEEDED
Status: DISCONTINUED | OUTPATIENT
Start: 2023-08-11 | End: 2023-08-11 | Stop reason: HOSPADM

## 2023-08-11 RX ORDER — KETOROLAC TROMETHAMINE 30 MG/ML
INJECTION, SOLUTION INTRAMUSCULAR; INTRAVENOUS AS NEEDED
Status: DISCONTINUED | OUTPATIENT
Start: 2023-08-11 | End: 2023-08-11 | Stop reason: SURG

## 2023-08-11 RX ORDER — LIDOCAINE HYDROCHLORIDE 10 MG/ML
0.5 INJECTION, SOLUTION INFILTRATION; PERINEURAL ONCE AS NEEDED
Status: DISCONTINUED | OUTPATIENT
Start: 2023-08-11 | End: 2023-08-11 | Stop reason: HOSPADM

## 2023-08-11 RX ORDER — FENTANYL CITRATE 50 UG/ML
INJECTION, SOLUTION INTRAMUSCULAR; INTRAVENOUS AS NEEDED
Status: DISCONTINUED | OUTPATIENT
Start: 2023-08-11 | End: 2023-08-11 | Stop reason: SURG

## 2023-08-11 RX ORDER — POLYETHYLENE GLYCOL 3350 17 G/17G
17 POWDER, FOR SOLUTION ORAL DAILY
Qty: 14 EACH | Refills: 0 | Status: SHIPPED | OUTPATIENT
Start: 2023-08-11 | End: 2023-09-03

## 2023-08-11 RX ORDER — LIDOCAINE HYDROCHLORIDE AND EPINEPHRINE 10; 10 MG/ML; UG/ML
INJECTION, SOLUTION INFILTRATION; PERINEURAL AS NEEDED
Status: DISCONTINUED | OUTPATIENT
Start: 2023-08-11 | End: 2023-08-11 | Stop reason: HOSPADM

## 2023-08-11 RX ORDER — EPHEDRINE SULFATE 5 MG/ML
5 INJECTION INTRAVENOUS ONCE AS NEEDED
Status: DISCONTINUED | OUTPATIENT
Start: 2023-08-11 | End: 2023-08-11 | Stop reason: HOSPADM

## 2023-08-11 RX ORDER — MIDAZOLAM HYDROCHLORIDE 1 MG/ML
INJECTION INTRAMUSCULAR; INTRAVENOUS AS NEEDED
Status: DISCONTINUED | OUTPATIENT
Start: 2023-08-11 | End: 2023-08-11 | Stop reason: SURG

## 2023-08-11 RX ORDER — FENTANYL CITRATE 50 UG/ML
50 INJECTION, SOLUTION INTRAMUSCULAR; INTRAVENOUS
Status: DISCONTINUED | OUTPATIENT
Start: 2023-08-11 | End: 2023-08-11 | Stop reason: HOSPADM

## 2023-08-11 RX ORDER — NALOXONE HCL 0.4 MG/ML
0.4 VIAL (ML) INJECTION AS NEEDED
Status: DISCONTINUED | OUTPATIENT
Start: 2023-08-11 | End: 2023-08-11 | Stop reason: HOSPADM

## 2023-08-11 RX ORDER — ONDANSETRON 2 MG/ML
INJECTION INTRAMUSCULAR; INTRAVENOUS AS NEEDED
Status: DISCONTINUED | OUTPATIENT
Start: 2023-08-11 | End: 2023-08-11 | Stop reason: SURG

## 2023-08-11 RX ORDER — NALBUPHINE HYDROCHLORIDE 10 MG/ML
2 INJECTION, SOLUTION INTRAMUSCULAR; INTRAVENOUS; SUBCUTANEOUS EVERY 4 HOURS PRN
Status: DISCONTINUED | OUTPATIENT
Start: 2023-08-11 | End: 2023-08-11 | Stop reason: HOSPADM

## 2023-08-11 RX ORDER — FLUMAZENIL 0.1 MG/ML
0.2 INJECTION INTRAVENOUS AS NEEDED
Status: DISCONTINUED | OUTPATIENT
Start: 2023-08-11 | End: 2023-08-11 | Stop reason: HOSPADM

## 2023-08-11 RX ORDER — HYDROCODONE BITARTRATE AND ACETAMINOPHEN 5; 325 MG/1; MG/1
1 TABLET ORAL EVERY 6 HOURS PRN
Qty: 12 TABLET | Refills: 0 | Status: SHIPPED | OUTPATIENT
Start: 2023-08-11 | End: 2023-09-03

## 2023-08-11 RX ORDER — GINSENG 100 MG
CAPSULE ORAL AS NEEDED
Status: DISCONTINUED | OUTPATIENT
Start: 2023-08-11 | End: 2023-08-11 | Stop reason: HOSPADM

## 2023-08-11 RX ORDER — DIPHENHYDRAMINE HYDROCHLORIDE 50 MG/ML
12.5 INJECTION INTRAMUSCULAR; INTRAVENOUS
Status: DISCONTINUED | OUTPATIENT
Start: 2023-08-11 | End: 2023-08-11 | Stop reason: HOSPADM

## 2023-08-11 RX ORDER — NALBUPHINE HYDROCHLORIDE 10 MG/ML
10 INJECTION, SOLUTION INTRAMUSCULAR; INTRAVENOUS; SUBCUTANEOUS EVERY 4 HOURS PRN
Status: DISCONTINUED | OUTPATIENT
Start: 2023-08-11 | End: 2023-08-11 | Stop reason: HOSPADM

## 2023-08-11 RX ORDER — ONDANSETRON 2 MG/ML
4 INJECTION INTRAMUSCULAR; INTRAVENOUS ONCE AS NEEDED
Status: DISCONTINUED | OUTPATIENT
Start: 2023-08-11 | End: 2023-08-11 | Stop reason: HOSPADM

## 2023-08-11 RX ORDER — SODIUM CHLORIDE, SODIUM LACTATE, POTASSIUM CHLORIDE, CALCIUM CHLORIDE 600; 310; 30; 20 MG/100ML; MG/100ML; MG/100ML; MG/100ML
1000 INJECTION, SOLUTION INTRAVENOUS CONTINUOUS
Status: DISCONTINUED | OUTPATIENT
Start: 2023-08-11 | End: 2023-08-11 | Stop reason: HOSPADM

## 2023-08-11 RX ORDER — PROPOFOL 10 MG/ML
INJECTION, EMULSION INTRAVENOUS AS NEEDED
Status: DISCONTINUED | OUTPATIENT
Start: 2023-08-11 | End: 2023-08-11 | Stop reason: SURG

## 2023-08-11 RX ORDER — LIDOCAINE HYDROCHLORIDE 20 MG/ML
INJECTION, SOLUTION EPIDURAL; INFILTRATION; INTRACAUDAL; PERINEURAL AS NEEDED
Status: DISCONTINUED | OUTPATIENT
Start: 2023-08-11 | End: 2023-08-11 | Stop reason: SURG

## 2023-08-11 RX ORDER — ONDANSETRON 4 MG/1
4 TABLET, FILM COATED ORAL EVERY 8 HOURS PRN
Qty: 30 TABLET | Refills: 1 | Status: SHIPPED | OUTPATIENT
Start: 2023-08-11 | End: 2023-09-03

## 2023-08-11 RX ORDER — PROMETHAZINE HYDROCHLORIDE 25 MG/1
25 SUPPOSITORY RECTAL ONCE AS NEEDED
Status: DISCONTINUED | OUTPATIENT
Start: 2023-08-11 | End: 2023-08-11 | Stop reason: HOSPADM

## 2023-08-11 RX ADMIN — SODIUM CHLORIDE, SODIUM LACTATE, POTASSIUM CHLORIDE, AND CALCIUM CHLORIDE: .6; .31; .03; .02 INJECTION, SOLUTION INTRAVENOUS at 16:45

## 2023-08-11 RX ADMIN — MIDAZOLAM 2 MG: 1 INJECTION INTRAMUSCULAR; INTRAVENOUS at 16:47

## 2023-08-11 RX ADMIN — FENTANYL CITRATE 50 MCG: 50 INJECTION, SOLUTION INTRAMUSCULAR; INTRAVENOUS at 16:49

## 2023-08-11 RX ADMIN — PROPOFOL 100 MG: 10 INJECTION, EMULSION INTRAVENOUS at 16:49

## 2023-08-11 RX ADMIN — FENTANYL CITRATE 50 MCG: 50 INJECTION, SOLUTION INTRAMUSCULAR; INTRAVENOUS at 17:02

## 2023-08-11 RX ADMIN — LIDOCAINE HYDROCHLORIDE 100 MG: 20 INJECTION, SOLUTION EPIDURAL; INFILTRATION; INTRACAUDAL; PERINEURAL at 16:49

## 2023-08-11 RX ADMIN — ONDANSETRON 4 MG: 2 INJECTION INTRAMUSCULAR; INTRAVENOUS at 16:56

## 2023-08-11 RX ADMIN — CEFAZOLIN 2 G: 2 INJECTION, POWDER, FOR SOLUTION INTRAMUSCULAR; INTRAVENOUS at 16:55

## 2023-08-11 RX ADMIN — KETOROLAC TROMETHAMINE 30 MG: 30 INJECTION, SOLUTION INTRAMUSCULAR at 17:19

## 2023-08-11 RX ADMIN — PROPOFOL 50 MG: 10 INJECTION, EMULSION INTRAVENOUS at 16:55

## 2023-08-11 RX ADMIN — PROPOFOL 100 MCG/KG/MIN: 10 INJECTION, EMULSION INTRAVENOUS at 16:50

## 2023-08-11 RX ADMIN — MEPERIDINE HYDROCHLORIDE 12.5 MG: 25 INJECTION INTRAMUSCULAR; INTRAVENOUS; SUBCUTANEOUS at 17:43

## 2023-08-11 RX ADMIN — DEXAMETHASONE SODIUM PHOSPHATE 8 MG: 4 INJECTION, SOLUTION INTRAMUSCULAR; INTRAVENOUS at 16:56

## 2023-08-11 NOTE — ANESTHESIA POSTPROCEDURE EVALUATION
Patient: Myrtle Clifford    Procedure Summary       Date: 08/11/23 Room / Location: [facility] OR 08 / [facility] MAIN OR    Anesthesia Start: 1645 Anesthesia Stop: 1731    Procedure: SKIN LESION EXCISION, right axilla, patent supine with arm up (Right) Diagnosis:       Skin lesion      (Skin lesion [L98.9])    Surgeons: Dion Carmona MD Provider: Ankita Cody CRNA    Anesthesia Type: general ASA Status: 2            Anesthesia Type: general    Vitals  Vitals Value Taken Time   /67 08/11/23 1757   Temp 98.1 øF (36.7 øC) 08/11/23 1725   Pulse 83 08/11/23 1757   Resp 13 08/11/23 1750   SpO2 98 % 08/11/23 1757   Vitals shown include unvalidated device data.        Post Anesthesia Care and Evaluation    Patient location during evaluation: PACU  Patient participation: complete - patient participated  Level of consciousness: awake  Pain scale: See nurse's notes for pain score.  Pain management: adequate    Airway patency: patent  Anesthetic complications: No anesthetic complications  PONV Status: none  Cardiovascular status: acceptable  Respiratory status: acceptable and spontaneous ventilation  Hydration status: acceptable    Comments: Patient seen and examined postoperatively; vital signs stable; SpO2 greater than or equal to 90%; cardiopulmonary status stable; nausea/vomiting adequately controlled; pain adequately controlled; no apparent anesthesia complications; patient discharged from anesthesia care when discharge criteria were met

## 2023-08-11 NOTE — ANESTHESIA PREPROCEDURE EVALUATION
Anesthesia Evaluation     Patient summary reviewed and Nursing notes reviewed   no history of anesthetic complications:   NPO Solid Status: > 8 hours  NPO Liquid Status: > 8 hours           Airway   Mallampati: II  TM distance: >3 FB  Neck ROM: full  No difficulty expected  Dental - normal exam     Pulmonary - normal exam   (+) a smoker Former,  Cardiovascular - negative cardio ROS and normal exam        Neuro/Psych  (+) psychiatric history Anxiety and Depression  GI/Hepatic/Renal/Endo - negative ROS     Musculoskeletal     (+) back pain  Abdominal  - normal exam   Substance History - negative use     OB/GYN negative ob/gyn ROS         Other                        Anesthesia Plan    ASA 2     general     intravenous induction     Anesthetic plan, risks, benefits, and alternatives have been provided, discussed and informed consent has been obtained with: patient.    Plan discussed with CRNA.      CODE STATUS:

## 2023-08-11 NOTE — H&P
General Surgery History and Physical Exam      Name: Myrtle Clifford ADMIT: 2023   : 1995  PCP: Daniel Robles MD    MRN: [id number] LOS: 0 days   AGE/SEX: 28 y.o. female  ROOM: [facility]      Patient Care Team:  Daniel Robles MD as PCP - General (Family Medicine)  Dion Carmona MD as Consulting Physician (General Surgery)  No chief complaint on file.      Subjective   The patient is a 28-year-old female with recurrent right axillary abscess likely sebaceous cyst versus primary skin issue in this area. We discussed options to continue medical management versus excising this area and any underlying subcutaneous cyst with re-approximation of her skin to decrease risk of this returning. We discussed risks, benefits, and alternatives including infection, bleeding, injury to surrounding structures. Patient elected to proceed.     Past Medical History:   Diagnosis Date    Anxiety associated with depression     Ectopic pregnancy     Hidradenitis      Past Surgical History:   Procedure Laterality Date    CHOLECYSTECTOMY  2016    ECTOPIC PREGNANCY  2019    TEAR DUCT SURGERY Bilateral     as a baby    TONSILLECTOMY      young child     Family History   Problem Relation Age of Onset    Hypertension Father     Diabetes Father     Cancer Maternal Grandmother     Cancer Maternal Grandfather        Social History     Tobacco Use    Smoking status: Former     Packs/day: 0.50     Years: 2.00     Pack years: 1.00     Types: Cigarettes     Quit date:      Years since quittin.6     Passive exposure: Never    Smokeless tobacco: Never   Vaping Use    Vaping Use: Never used   Substance Use Topics    Alcohol use: Not Currently    Drug use: Never     Medications Prior to Admission   Medication Sig Dispense Refill Last Dose    escitalopram (LEXAPRO) 10 MG tablet Take 1 tablet by mouth Daily.   8/10/2023    ferrous sulfate 325 (65 FE) MG tablet Take 1 tablet by mouth Daily With Breakfast.    8/2/2023    ibuprofen (ADVIL,MOTRIN) 600 MG tablet Take 1 tablet by mouth Every 6 (Six) Hours As Needed for Mild Pain (First Line: Mild pain.). 60 tablet 1 8/2/2023    Zafemy 150-35 MCG/24HR Place 1 patch on the skin as directed by provider 1 (One) Time Per Week.   8/6/2023     ceFAZolin, 2 g, Intravenous, Once      lactated ringers, 1,000 mL        lidocaine    sodium chloride  Bactrim [sulfamethoxazole-trimethoprim]    Review of Systems   Constitutional:  Negative for chills and fever.   HENT:  Negative for rhinorrhea and trouble swallowing.    Eyes:  Negative for blurred vision and double vision.   Respiratory:  Negative for cough and shortness of breath.    Cardiovascular:  Negative for chest pain and palpitations.   Gastrointestinal:  Negative for abdominal distention and abdominal pain.   Musculoskeletal:  Negative for back pain and myalgias.   Skin:  Negative for color change and dry skin.   Neurological:  Negative for headache and confusion.   Psychiatric/Behavioral:  Negative for agitation and hallucinations.       Objective     Vital Signs and Labs:  Vital Signs Patient Vitals for the past 24 hrs:   BP Temp Pulse Resp SpO2   08/11/23 1142 103/68 98.5 °F (36.9 °C) 82 16 99 %     I/O:  No intake/output data recorded.    Physical Exam:  Physical Exam  Constitutional:       General: She is not in acute distress.     Appearance: Normal appearance. She is not ill-appearing.   HENT:      Head: Normocephalic and atraumatic.      Right Ear: External ear normal.      Left Ear: External ear normal.   Eyes:      Extraocular Movements: Extraocular movements intact.      Conjunctiva/sclera: Conjunctivae normal.   Cardiovascular:      Rate and Rhythm: Normal rate and regular rhythm.   Pulmonary:      Effort: Pulmonary effort is normal. No respiratory distress.   Abdominal:      General: There is no distension.      Palpations: Abdomen is soft.      Tenderness: There is no abdominal tenderness.   Musculoskeletal:          General: No swelling or deformity.   Skin:     General: Skin is warm and dry.   Neurological:      Mental Status: She is alert and oriented to person, place, and time. Mental status is at baseline.       CBC      BMP       I reviewed the patient's new clinical results.    Assessment & Plan       Skin lesion      The patient is a 28-year-old female with recurrent right axillary abscess likely sebaceous cyst versus primary skin issue in this area. We discussed options to continue medical management versus excising this area and any underlying subcutaneous cyst with re-approximation of her skin to decrease risk of this returning. We discussed risks, benefits, and alternatives including infection, bleeding, injury to surrounding structures. Patient elected to proceed.       This note was created using Dragon Voice Recognition software.    Dion Carmona MD  08/11/23  16:21 EDT

## 2023-08-15 LAB
LAB AP CASE REPORT: NORMAL
LAB AP DIAGNOSIS COMMENT: NORMAL
PATH REPORT.FINAL DX SPEC: NORMAL
PATH REPORT.GROSS SPEC: NORMAL

## 2023-08-28 ENCOUNTER — OFFICE VISIT (OUTPATIENT)
Dept: SURGERY | Facility: CLINIC | Age: 28
End: 2023-08-28
Payer: MEDICAID

## 2023-08-28 VITALS
DIASTOLIC BLOOD PRESSURE: 80 MMHG | HEIGHT: 62 IN | SYSTOLIC BLOOD PRESSURE: 123 MMHG | OXYGEN SATURATION: 100 % | BODY MASS INDEX: 30.8 KG/M2 | HEART RATE: 94 BPM | WEIGHT: 167.4 LBS | TEMPERATURE: 98.4 F

## 2023-08-28 DIAGNOSIS — L98.9 SKIN LESION: Primary | ICD-10-CM

## 2023-08-28 PROCEDURE — 99024 POSTOP FOLLOW-UP VISIT: CPT | Performed by: STUDENT IN AN ORGANIZED HEALTH CARE EDUCATION/TRAINING PROGRAM

## 2023-08-31 NOTE — OP NOTE
Operative Report:    Patient Name:  Myrtle Clifford  YOB: 1995    Date of Surgery:  8/11/2023     Indications:    The patient is a 28-year-old female with recurrent right axillary abscess likely sebaceous cyst versus primary skin issue in this area. We discussed options to continue medical management versus excising this area and any underlying subcutaneous cyst with re-approximation of her skin to decrease risk of this returning. We discussed risks, benefits, and alternatives including infection, bleeding, injury to surrounding structures. Patient elected to proceed.      Pre-op Diagnosis:   Skin lesion [L98.9]       Post-Op Diagnosis Codes:     * Skin lesion [L98.9]    Procedure/CPT® Codes:      Procedure(s):  SKIN LESION EXCISION, right axilla, patent supine with arm up    Staff:  Surgeon(s):  Dion Carmona MD    Circulator: Leigh Ann Duncan RN  Scrub Person: Nadine Jamison        Anesthesia: Monitored Anesthesia Care    Estimated Blood Loss: minimal    Implants:    Nothing was implanted during the procedure    Specimen:          Specimens       ID Source Type Tests Collected By Collected At Frozen?    A Axilla, Right Tissue TISSUE PATHOLOGY EXAM   Dion Carmona MD 8/11/23 9996 No    Description: axilla cyst                Findings: Raised area in the right axillary presumed small cyst    Complications: None    Description of Procedure:   After risk benefits and alternatives were discussed patient informed sent was obtained.  She was transported the operating room placed supine operating table underwent general anesthesia.  Her right axilla was prepped and draped in sterile fashion surgical timeout completed.  I performed a field block with 30 cc of 1% lidocaine with epinephrine.  I made elliptical incision around the skin lesion incision measured approximately 3 cm.  I used Metzenbaum scissors to dissect (will around the skin and a portion of subcutaneous tissue this was sent to pathology.   I irrigated the wound and obtained hemostasis with electrocautery.  I reapproximated the skin with interrupted 3-0 nylon sutures in a vertical mattress fashion.  Bacitracin and sterile dressing were applied.  Patient was awoke from general anesthesia and transported recovery in without incident.      Dion Carmona MD     Date: 8/31/2023  Time: 15:49 EDT    This note was created using Dragon Voice Recognition software.

## 2023-09-03 NOTE — PROGRESS NOTES
"Chief Complaint  Post-op (Axillary Lesion Excision 8/11/23)    Subjective        Myrtle Clifford presents to [facility] GENERAL SURGERY  History of Present Illness    28-year-old lady here for follow-up after her axillary lesion excision.  We discussed her pathology results showing no cyst or malignancy likely just chronically infected skin.  Vision healed well removed her sutures today.  No further restrictions from my standpoint she can follow-up me again in the future if needed.    Objective   Vital Signs:  /80 (Cuff Size: Adult)   Pulse 94   Temp 98.4 øF (36.9 øC) (Infrared)   Ht 157.5 cm (62\")   Wt 75.9 kg (167 lb 6.4 oz)   SpO2 100%   BMI 30.62 kg/mý   Estimated body mass index is 30.62 kg/mý as calculated from the following:    Height as of this encounter: 157.5 cm (62\").    Weight as of this encounter: 75.9 kg (167 lb 6.4 oz).               Physical Exam  Constitutional:       General: She is not in acute distress.     Appearance: Normal appearance. She is not ill-appearing.   HENT:      Head: Normocephalic and atraumatic.      Right Ear: External ear normal.      Left Ear: External ear normal.   Eyes:      Extraocular Movements: Extraocular movements intact.      Conjunctiva/sclera: Conjunctivae normal.   Cardiovascular:      Rate and Rhythm: Normal rate and regular rhythm.   Pulmonary:      Effort: Pulmonary effort is normal. No respiratory distress.   Abdominal:      General: There is no distension.      Palpations: Abdomen is soft.      Tenderness: There is no abdominal tenderness.   Musculoskeletal:         General: No swelling or deformity.   Skin:     General: Skin is warm and dry.   Neurological:      Mental Status: She is alert and oriented to person, place, and time. Mental status is at baseline.      Result Review :                   Assessment and Plan   Diagnoses and all orders for this visit:    1. Skin lesion (Primary)      28-year-old lady here for follow-up after " her axillary lesion excision.  We discussed her pathology results showing no cyst or malignancy likely just chronically infected skin.  Vision healed well removed her sutures today.  No further restrictions from my standpoint she can follow-up me again in the future if needed.         Follow Up   No follow-ups on file.  Patient was given instructions and counseling regarding her condition or for health maintenance advice. Please see specific information pulled into the AVS if appropriate.

## 2023-10-23 ENCOUNTER — OFFICE VISIT (OUTPATIENT)
Dept: SURGERY | Facility: CLINIC | Age: 28
End: 2023-10-23
Payer: MEDICAID

## 2023-10-23 VITALS
BODY MASS INDEX: 32.79 KG/M2 | HEIGHT: 62 IN | DIASTOLIC BLOOD PRESSURE: 79 MMHG | TEMPERATURE: 98.2 F | OXYGEN SATURATION: 99 % | SYSTOLIC BLOOD PRESSURE: 122 MMHG | HEART RATE: 85 BPM | WEIGHT: 178.2 LBS

## 2023-10-23 DIAGNOSIS — L02.91 ABSCESS: Primary | ICD-10-CM

## 2023-10-23 PROCEDURE — 1160F RVW MEDS BY RX/DR IN RCRD: CPT | Performed by: STUDENT IN AN ORGANIZED HEALTH CARE EDUCATION/TRAINING PROGRAM

## 2023-10-23 PROCEDURE — 1159F MED LIST DOCD IN RCRD: CPT | Performed by: STUDENT IN AN ORGANIZED HEALTH CARE EDUCATION/TRAINING PROGRAM

## 2023-10-23 PROCEDURE — 99024 POSTOP FOLLOW-UP VISIT: CPT | Performed by: STUDENT IN AN ORGANIZED HEALTH CARE EDUCATION/TRAINING PROGRAM

## 2023-10-23 RX ORDER — CLINDAMYCIN HYDROCHLORIDE 150 MG/1
150 CAPSULE ORAL 4 TIMES DAILY
Qty: 40 CAPSULE | Refills: 0 | Status: SHIPPED | OUTPATIENT
Start: 2023-10-23 | End: 2023-11-02

## 2023-10-23 NOTE — PROGRESS NOTES
"Chief Complaint  Follow-up (Reoccurrence of Axillary lesion)    Subjective        Myrtle Clifford presents to [facility] GENERAL SURGERY  History of Present Illness    28-year-old lady here for follow-up after her axillary lesion excision.  We discussed her pathology results showing no cyst or malignancy likely just chronically infected skin.  She had been doing well until this weekend she developed pain at the previous surgical site and then some yellow drainage.  No surrounding cellulitis no fevers.  Likely infected seroma versus recurrent skin lesion.  Will prescribe course of antibiotics and have her follow-up in a month.  If this heals nothing else to do if she continues to have recurrent issues may need to see dermatologist for possible hidradenitis.  Follow-up in 1 month.      Objective   Vital Signs:  /79 (Cuff Size: Adult)   Pulse 85   Temp 98.2 °F (36.8 °C) (Infrared)   Ht 157.5 cm (62\")   Wt 80.8 kg (178 lb 3.2 oz)   SpO2 99%   BMI 32.59 kg/m²   Estimated body mass index is 32.59 kg/m² as calculated from the following:    Height as of this encounter: 157.5 cm (62\").    Weight as of this encounter: 80.8 kg (178 lb 3.2 oz).               Physical Exam  Constitutional:       General: She is not in acute distress.     Appearance: Normal appearance. She is not ill-appearing.   HENT:      Head: Normocephalic and atraumatic.      Right Ear: External ear normal.      Left Ear: External ear normal.   Eyes:      Extraocular Movements: Extraocular movements intact.      Conjunctiva/sclera: Conjunctivae normal.   Cardiovascular:      Rate and Rhythm: Normal rate and regular rhythm.   Pulmonary:      Effort: Pulmonary effort is normal. No respiratory distress.   Abdominal:      General: There is no distension.      Palpations: Abdomen is soft.      Tenderness: There is no abdominal tenderness.   Musculoskeletal:         General: No swelling or deformity.   Skin:     General: Skin is warm and " dry.   Neurological:      Mental Status: She is alert and oriented to person, place, and time. Mental status is at baseline.      Result Review :                   Assessment and Plan   Diagnoses and all orders for this visit:    1. Abscess (Primary)    Other orders  -     clindamycin (Cleocin) 150 MG capsule; Take 1 capsule by mouth 4 (Four) Times a Day for 10 days.  Dispense: 40 capsule; Refill: 0      28-year-old lady here for follow-up after her axillary lesion excision.  We discussed her pathology results showing no cyst or malignancy likely just chronically infected skin.  She had been doing well until this weekend she developed pain at the previous surgical site and then some yellow drainage.  No surrounding cellulitis no fevers.  Likely infected seroma versus recurrent skin lesion.  Will prescribe course of antibiotics and have her follow-up in a month.  If this heals nothing else to do if she continues to have recurrent issues may need to see dermatologist for possible hidradenitis.  Follow-up in 1 month.         Follow Up   No follow-ups on file.  Patient was given instructions and counseling regarding her condition or for health maintenance advice. Please see specific information pulled into the AVS if appropriate.

## 2023-11-14 ENCOUNTER — OFFICE VISIT (OUTPATIENT)
Dept: SURGERY | Facility: CLINIC | Age: 28
End: 2023-11-14
Payer: MEDICAID

## 2023-11-14 VITALS
DIASTOLIC BLOOD PRESSURE: 78 MMHG | TEMPERATURE: 98.6 F | SYSTOLIC BLOOD PRESSURE: 117 MMHG | WEIGHT: 185 LBS | HEART RATE: 91 BPM | OXYGEN SATURATION: 99 % | BODY MASS INDEX: 34.04 KG/M2 | HEIGHT: 62 IN

## 2023-11-14 DIAGNOSIS — L98.9 SKIN LESION: ICD-10-CM

## 2023-11-14 DIAGNOSIS — L73.2 HIDRADENITIS: Primary | ICD-10-CM

## 2023-11-14 PROCEDURE — 99213 OFFICE O/P EST LOW 20 MIN: CPT | Performed by: STUDENT IN AN ORGANIZED HEALTH CARE EDUCATION/TRAINING PROGRAM

## 2023-11-14 PROCEDURE — 1160F RVW MEDS BY RX/DR IN RCRD: CPT | Performed by: STUDENT IN AN ORGANIZED HEALTH CARE EDUCATION/TRAINING PROGRAM

## 2023-11-14 PROCEDURE — 1159F MED LIST DOCD IN RCRD: CPT | Performed by: STUDENT IN AN ORGANIZED HEALTH CARE EDUCATION/TRAINING PROGRAM

## 2023-11-20 NOTE — PROGRESS NOTES
"Chief Complaint  Post-op (1 Mos fu Right Axilla lesion excision)    Subjective        Myrtle Clifford presents to [facility] GENERAL SURGERY  History of Present Illness    28-year-old lady here for follow-up after her previous skin lesion excision from her right axilla.  This had healed well but then developed recurrent open area that was draining.  Today appears to be healing again still some granulation tissue at play silver nitrate on this.  Given her recurrent skin lesions may have hidradenitis we will place dermatology referral.    Objective   Vital Signs:  /78 (Cuff Size: Adult)   Pulse 91   Temp 98.6 °F (37 °C) (Infrared)   Ht 157.5 cm (62\")   Wt 83.9 kg (185 lb)   SpO2 99%   BMI 33.84 kg/m²   Estimated body mass index is 33.84 kg/m² as calculated from the following:    Height as of this encounter: 157.5 cm (62\").    Weight as of this encounter: 83.9 kg (185 lb).               Physical Exam  Constitutional:       General: She is not in acute distress.     Appearance: Normal appearance. She is not ill-appearing.   HENT:      Head: Normocephalic and atraumatic.      Right Ear: External ear normal.      Left Ear: External ear normal.   Eyes:      Extraocular Movements: Extraocular movements intact.      Conjunctiva/sclera: Conjunctivae normal.   Cardiovascular:      Rate and Rhythm: Normal rate and regular rhythm.   Pulmonary:      Effort: Pulmonary effort is normal. No respiratory distress.   Abdominal:      General: There is no distension.      Palpations: Abdomen is soft.      Tenderness: There is no abdominal tenderness.   Musculoskeletal:         General: No swelling or deformity.   Skin:     General: Skin is warm and dry.   Neurological:      Mental Status: She is alert and oriented to person, place, and time. Mental status is at baseline.      Result Review :                   Assessment and Plan   Diagnoses and all orders for this visit:    1. Hidradenitis (Primary)  -     " Ambulatory Referral to Dermatology    2. Skin lesion      28-year-old lady here for follow-up after her previous skin lesion excision from her right axilla.  This had healed well but then developed recurrent open area that was draining.  Today appears to be healing again still some granulation tissue at play silver nitrate on this.  Given her recurrent skin lesions may have hidradenitis we will place dermatology referral.         Follow Up   No follow-ups on file.  Patient was given instructions and counseling regarding her condition or for health maintenance advice. Please see specific information pulled into the AVS if appropriate.

## 2023-12-16 ENCOUNTER — HOSPITAL ENCOUNTER (EMERGENCY)
Facility: HOSPITAL | Age: 28
Discharge: HOME | End: 2023-12-17
Attending: EMERGENCY MEDICINE | Admitting: EMERGENCY MEDICINE
Payer: MEDICAID

## 2023-12-16 DIAGNOSIS — R10.9 RIGHT SIDED ABDOMINAL PAIN: Primary | ICD-10-CM

## 2023-12-16 LAB
ALBUMIN SERPL-MCNC: 3.8 G/DL (ref 3.5–5.2)
ALBUMIN/GLOB SERPL: 1 G/DL
ALP SERPL-CCNC: 111 U/L (ref 39–117)
ALT SERPL W P-5'-P-CCNC: 18 U/L (ref 1–33)
ANION GAP SERPL CALCULATED.3IONS-SCNC: 11 MMOL/L (ref 5–15)
AST SERPL-CCNC: 21 U/L (ref 1–32)
BASOPHILS # BLD AUTO: 0 10*3/MM3 (ref 0–0.2)
BASOPHILS NFR BLD AUTO: 0.5 % (ref 0–1.5)
BILIRUB SERPL-MCNC: <0.2 MG/DL (ref 0–1.2)
BUN SERPL-MCNC: 11 MG/DL (ref 6–20)
BUN/CREAT SERPL: 20.8 (ref 7–25)
CALCIUM SPEC-SCNC: 9 MG/DL (ref 8.6–10.5)
CHLORIDE SERPL-SCNC: 102 MMOL/L (ref 98–107)
CO2 SERPL-SCNC: 26 MMOL/L (ref 22–29)
CREAT SERPL-MCNC: 0.53 MG/DL (ref 0.57–1)
DEPRECATED RDW RBC AUTO: 37.6 FL (ref 37–54)
EGFRCR SERPLBLD CKD-EPI 2021: 131 ML/MIN/1.73
EOSINOPHIL # BLD AUTO: 0.3 10*3/MM3 (ref 0–0.4)
EOSINOPHIL NFR BLD AUTO: 4.1 % (ref 0.3–6.2)
ERYTHROCYTE [DISTWIDTH] IN BLOOD BY AUTOMATED COUNT: 16 % (ref 12.3–15.4)
GLOBULIN UR ELPH-MCNC: 3.9 GM/DL
GLUCOSE SERPL-MCNC: 104 MG/DL (ref 65–99)
HCT VFR BLD AUTO: 34.9 % (ref 34–46.6)
HGB BLD-MCNC: 11 G/DL (ref 12–15.9)
LIPASE SERPL-CCNC: 38 U/L (ref 13–60)
LYMPHOCYTES # BLD AUTO: 2.4 10*3/MM3 (ref 0.7–3.1)
LYMPHOCYTES NFR BLD AUTO: 32.2 % (ref 19.6–45.3)
MCH RBC QN AUTO: 20.8 PG (ref 26.6–33)
MCHC RBC AUTO-ENTMCNC: 31.5 G/DL (ref 31.5–35.7)
MCV RBC AUTO: 66.1 FL (ref 79–97)
MONOCYTES # BLD AUTO: 0.8 10*3/MM3 (ref 0.1–0.9)
MONOCYTES NFR BLD AUTO: 10.1 % (ref 5–12)
NEUTROPHILS NFR BLD AUTO: 4 10*3/MM3 (ref 1.7–7)
NEUTROPHILS NFR BLD AUTO: 53.1 % (ref 42.7–76)
NRBC BLD AUTO-RTO: 0 /100 WBC (ref 0–0.2)
PLATELET # BLD AUTO: 367 10*3/MM3 (ref 140–450)
PMV BLD AUTO: 7.6 FL (ref 6–12)
POTASSIUM SERPL-SCNC: 3.8 MMOL/L (ref 3.5–5.2)
PROT SERPL-MCNC: 7.7 G/DL (ref 6–8.5)
RBC # BLD AUTO: 5.28 10*6/MM3 (ref 3.77–5.28)
SODIUM SERPL-SCNC: 139 MMOL/L (ref 136–145)
WBC NRBC COR # BLD AUTO: 7.6 10*3/MM3 (ref 3.4–10.8)

## 2023-12-16 PROCEDURE — 99285 EMERGENCY DEPT VISIT HI MDM: CPT

## 2023-12-16 PROCEDURE — 25010000002 ONDANSETRON PER 1 MG: Performed by: NURSE PRACTITIONER

## 2023-12-16 PROCEDURE — 83690 ASSAY OF LIPASE: CPT | Performed by: NURSE PRACTITIONER

## 2023-12-16 PROCEDURE — 80053 COMPREHEN METABOLIC PANEL: CPT | Performed by: NURSE PRACTITIONER

## 2023-12-16 PROCEDURE — 85025 COMPLETE CBC W/AUTO DIFF WBC: CPT | Performed by: NURSE PRACTITIONER

## 2023-12-16 PROCEDURE — 96374 THER/PROPH/DIAG INJ IV PUSH: CPT

## 2023-12-16 RX ORDER — SODIUM CHLORIDE 0.9 % (FLUSH) 0.9 %
10 SYRINGE (ML) INJECTION AS NEEDED
Status: DISCONTINUED | OUTPATIENT
Start: 2023-12-16 | End: 2023-12-17 | Stop reason: HOSPADM

## 2023-12-16 RX ORDER — ONDANSETRON 2 MG/ML
4 INJECTION INTRAMUSCULAR; INTRAVENOUS ONCE
Status: COMPLETED | OUTPATIENT
Start: 2023-12-16 | End: 2023-12-16

## 2023-12-16 RX ADMIN — ONDANSETRON 4 MG: 2 INJECTION INTRAMUSCULAR; INTRAVENOUS at 23:32

## 2023-12-17 ENCOUNTER — APPOINTMENT (OUTPATIENT)
Dept: CT IMAGING | Facility: HOSPITAL | Age: 28
End: 2023-12-17
Payer: MEDICAID

## 2023-12-17 VITALS
OXYGEN SATURATION: 97 % | HEART RATE: 80 BPM | DIASTOLIC BLOOD PRESSURE: 65 MMHG | RESPIRATION RATE: 18 BRPM | BODY MASS INDEX: 34.69 KG/M2 | WEIGHT: 188.49 LBS | SYSTOLIC BLOOD PRESSURE: 110 MMHG | HEIGHT: 62 IN | TEMPERATURE: 98.1 F

## 2023-12-17 LAB
B-HCG UR QL: NEGATIVE
BACTERIA UR QL AUTO: ABNORMAL /HPF
BILIRUB UR QL STRIP: NEGATIVE
CLARITY UR: ABNORMAL
COLOR UR: YELLOW
GLUCOSE UR STRIP-MCNC: NEGATIVE MG/DL
HGB UR QL STRIP.AUTO: NEGATIVE
HYALINE CASTS UR QL AUTO: ABNORMAL /LPF
KETONES UR QL STRIP: NEGATIVE
LEUKOCYTE ESTERASE UR QL STRIP.AUTO: ABNORMAL
NITRITE UR QL STRIP: NEGATIVE
PH UR STRIP.AUTO: 7 [PH] (ref 5–8)
PROT UR QL STRIP: NEGATIVE
RBC # UR STRIP: ABNORMAL /HPF
REF LAB TEST METHOD: ABNORMAL
SP GR UR STRIP: 1.02 (ref 1–1.03)
SQUAMOUS #/AREA URNS HPF: ABNORMAL /HPF
UROBILINOGEN UR QL STRIP: ABNORMAL
WBC # UR STRIP: ABNORMAL /HPF

## 2023-12-17 PROCEDURE — 25510000001 IOPAMIDOL PER 1 ML: Performed by: EMERGENCY MEDICINE

## 2023-12-17 PROCEDURE — 74177 CT ABD & PELVIS W/CONTRAST: CPT

## 2023-12-17 PROCEDURE — 81001 URINALYSIS AUTO W/SCOPE: CPT | Performed by: NURSE PRACTITIONER

## 2023-12-17 PROCEDURE — 81025 URINE PREGNANCY TEST: CPT | Performed by: NURSE PRACTITIONER

## 2023-12-17 RX ADMIN — IOPAMIDOL 100 ML: 755 INJECTION, SOLUTION INTRAVENOUS at 02:04

## 2023-12-17 NOTE — DISCHARGE INSTRUCTIONS
Follow up with PCP, call for an appointment in 1-2 days, if you do not have a primary care provider please use patient connection [phone] to help establish care  Follow up with any specialist as indicated and discussed  Return to the ED for new or worsening symptoms  Take any medications as prescribed

## 2023-12-17 NOTE — ED PROVIDER NOTES
Subjective   Chief Complaint   Patient presents with    Abdominal Pain     Pt reports R sided abdominal pain, nausea and some lightheadedness that started 40 mins ago.  Denies any fever or V/D.        History of Present Illness  Patient is a 28-year-old female presents emergency department complaint of abdominal pain, nausea, lightheadedness.  She reports it started at 10 PM, was initially 10 out of 10, has decreased to 3.  Reports the pain starts in the right lower abdomen, radiates up the side, into the upper abdomen.  Denies any urinary symptoms.  No abnormal vaginal discharge or bleeding.  No fevers.  Review of Systems   Constitutional:  Negative for chills and fever.   Respiratory:  Negative for shortness of breath.    Cardiovascular:  Negative for chest pain.   Gastrointestinal:  Positive for abdominal pain and nausea. Negative for diarrhea and vomiting.   Genitourinary:  Negative for difficulty urinating, dysuria, flank pain and urgency.   Musculoskeletal:  Negative for back pain and neck pain.   Skin:  Negative for color change and rash.   Neurological:  Positive for light-headedness. Negative for dizziness, tremors, seizures, syncope, facial asymmetry, speech difficulty, weakness, numbness and headaches.       Past Medical History:   Diagnosis Date    Anxiety associated with depression     Ectopic pregnancy     Hidradenitis        Allergies   Allergen Reactions    Bactrim [Sulfamethoxazole-Trimethoprim] Hives       Past Surgical History:   Procedure Laterality Date    CHOLECYSTECTOMY  2016    ECTOPIC PREGNANCY  2019    MASS EXCISION Right 8/11/2023    Procedure: SKIN LESION EXCISION, right axilla, patent supine with arm up;  Surgeon: Dion Carmona MD;  Location: [facility] OR;  Service: General;  Laterality: Right;    TEAR DUCT SURGERY Bilateral     as a baby    TONSILLECTOMY      young child       Family History   Problem Relation Age of Onset    Hypertension Father     Diabetes Father     Cancer  Maternal Grandmother     Cancer Maternal Grandfather        Social History     Socioeconomic History    Marital status:      Spouse name: annamarie correa    Number of children: 3    Years of education: 14   Tobacco Use    Smoking status: Former     Packs/day: 0.50     Years: 2.00     Additional pack years: 0.00     Total pack years: 1.00     Types: Cigarettes     Quit date:      Years since quittin.9     Passive exposure: Never    Smokeless tobacco: Never   Vaping Use    Vaping Use: Never used   Substance and Sexual Activity    Alcohol use: Not Currently    Drug use: Never    Sexual activity: Yes     Partners: Male           Objective   Physical Exam  Vitals and nursing note reviewed.   Constitutional:       Appearance: Normal appearance. She is not toxic-appearing.   HENT:      Head: Normocephalic and atraumatic.      Mouth/Throat:      Mouth: Mucous membranes are moist.      Pharynx: Oropharynx is clear.   Cardiovascular:      Rate and Rhythm: Normal rate and regular rhythm.      Heart sounds: Normal heart sounds. No murmur heard.     No friction rub. No gallop.   Pulmonary:      Effort: Pulmonary effort is normal.      Breath sounds: Normal breath sounds.   Abdominal:      General: Abdomen is flat. Bowel sounds are normal.      Palpations: Abdomen is soft.      Tenderness: There is abdominal tenderness in the right lower quadrant, periumbilical area, suprapubic area and left lower quadrant. There is no guarding or rebound.   Musculoskeletal:         General: Normal range of motion.      Cervical back: Normal range of motion and neck supple.   Skin:     General: Skin is warm and dry.      Capillary Refill: Capillary refill takes less than 2 seconds.   Neurological:      Mental Status: She is alert and oriented to person, place, and time.   Psychiatric:         Mood and Affect: Mood normal.         Behavior: Behavior normal.         Procedures           ED Course  ED Course as of 23 0302   Sun Dec  "17, 2023   0138 Pt awaiting ct [LB]      ED Course User Index  [LB] Tessy Wu, MARICRUZ      /65 (BP Location: Right arm, Patient Position: Sitting)   Pulse 80   Temp 98.1 °F (36.7 °C) (Oral)   Resp 18   Ht 157.5 cm (62\")   Wt 85.5 kg (188 lb 7.9 oz)   LMP 11/21/2023   SpO2 97%   BMI 34.48 kg/m²   Medications   sodium chloride 0.9 % flush 10 mL (has no administration in time range)   ondansetron (ZOFRAN) injection 4 mg (4 mg Intravenous Given 12/16/23 2332)   iopamidol (ISOVUE-370) 76 % injection 100 mL (100 mL Intravenous Given 12/17/23 0204)     CT Abdomen Pelvis With Contrast    Result Date: 12/17/2023  Impression: 1.No acute intra-abdominal or intrapelvic process. 2.Ancillary findings as described above. Electronically Signed: Chica Gan MD  12/17/2023 2:08 AM EST  Workstation ID: [id number]   Lab Results (last 24 hours)       Procedure Component Value Units Date/Time    CBC & Differential [[id number]]  (Abnormal) Collected: 12/16/23 2331    Specimen: Blood from Arm, Left Updated: 12/16/23 2356    Narrative:      The following orders were created for panel order CBC & Differential.  Procedure                               Abnormality         Status                     ---------                               -----------         ------                     CBC Auto Differential[[id number]]        Abnormal            Final result               Scan Slide[[id number]]                                                                    Please view results for these tests on the individual orders.    Comprehensive Metabolic Panel [[id number]]  (Abnormal) Collected: 12/16/23 2331    Specimen: Blood from Arm, Left Updated: 12/16/23 2355     Glucose 104 mg/dL      BUN 11 mg/dL      Creatinine 0.53 mg/dL      Sodium 139 mmol/L      Potassium 3.8 mmol/L      Comment: Slight hemolysis detected by analyzer. Result may be falsely elevated.        Chloride 102 mmol/L      CO2 26.0 mmol/L      Calcium 9.0 " mg/dL      Total Protein 7.7 g/dL      Albumin 3.8 g/dL      ALT (SGPT) 18 U/L      AST (SGOT) 21 U/L      Alkaline Phosphatase 111 U/L      Total Bilirubin <0.2 mg/dL      Globulin 3.9 gm/dL      A/G Ratio 1.0 g/dL      BUN/Creatinine Ratio 20.8     Anion Gap 11.0 mmol/L      eGFR 131.0 mL/min/1.73     Narrative:      GFR Normal >60  Chronic Kidney Disease <60  Kidney Failure <15      Lipase [[id number]]  (Normal) Collected: 12/16/23 2331    Specimen: Blood from Arm, Left Updated: 12/16/23 2355     Lipase 38 U/L     CBC Auto Differential [[id number]]  (Abnormal) Collected: 12/16/23 2331    Specimen: Blood from Arm, Left Updated: 12/16/23 2356     WBC 7.60 10*3/mm3      RBC 5.28 10*6/mm3      Hemoglobin 11.0 g/dL      Hematocrit 34.9 %      MCV 66.1 fL      MCH 20.8 pg      MCHC 31.5 g/dL      RDW 16.0 %      RDW-SD 37.6 fl      MPV 7.6 fL      Platelets 367 10*3/mm3      Neutrophil % 53.1 %      Lymphocyte % 32.2 %      Monocyte % 10.1 %      Eosinophil % 4.1 %      Basophil % 0.5 %      Neutrophils, Absolute 4.00 10*3/mm3      Lymphocytes, Absolute 2.40 10*3/mm3      Monocytes, Absolute 0.80 10*3/mm3      Eosinophils, Absolute 0.30 10*3/mm3      Basophils, Absolute 0.00 10*3/mm3      nRBC 0.0 /100 WBC     Pregnancy, Urine - Urine, Clean Catch [[id number]]  (Normal) Collected: 12/17/23 0002    Specimen: Urine, Clean Catch Updated: 12/17/23 0011     HCG, Urine QL Negative    Urinalysis With Microscopic If Indicated (No Culture) - Urine, Clean Catch [[id number]]  (Abnormal) Collected: 12/17/23 0002    Specimen: Urine, Clean Catch Updated: 12/17/23 0017     Color, UA Yellow     Appearance, UA Turbid     pH, UA 7.0     Specific Gravity, UA 1.021     Glucose, UA Negative     Ketones, UA Negative     Bilirubin, UA Negative     Blood, UA Negative     Protein, UA Negative     Leuk Esterase, UA Trace     Nitrite, UA Negative     Urobilinogen, UA 1.0 E.U./dL    Urinalysis, Microscopic Only - Urine, Clean Catch [[id number]]   (Abnormal) Collected: 12/17/23 0002    Specimen: Urine, Clean Catch Updated: 12/17/23 0017     RBC, UA 0-2 /HPF      WBC, UA 0-2 /HPF      Bacteria, UA Trace /HPF      Squamous Epithelial Cells, UA 3-6 /HPF      Hyaline Casts, UA 0-2 /LPF      Methodology Automated Microscopy                                                 Medical Decision Making  Problems Addressed:  Right sided abdominal pain: complicated acute illness or injury    Amount and/or Complexity of Data Reviewed  Labs: ordered.  Radiology: ordered.    Risk  Prescription drug management.    Chart Review: Surgical progress no for axillary lesion incision.    Labs: CBC CMP reviewed.  Pregnancy test negative.  UA not indicative of infection.  Lipase normal    Imaging: CT Abdomen Pelvis With Contrast    Result Date: 12/17/2023  Impression: 1.No acute intra-abdominal or intrapelvic process. 2.Ancillary findings as described above. Electronically Signed: Chica Gan MD  12/17/2023 2:08 AM EST  Workstation ID: [id number]       28 old female presents ED with right-sided abdominal pain.  Differential diagnoses considered for patient presentation, this list is not all inclusive of diagnoses considered: Appendicitis, pyelonephritis, zoster.  Patient and review of exam and workup.  CT negative for any acute findings.  Lab work reassuring.  Patient afebrile, nontoxic non-ill-appearing.  Advise follow-up with PCP.    Disposition: I spoke with the patient at the bedside regarding their plan of care, discharge instruction,  prescriptions, as well as reasons to return to the emergency department.  We discussed test results at the bedside, including incidental abnormal labs, radiological findings, understands need and importance  for follow-up with primary care or specialist if indicated.  Patient verbalizes understanding and agrees to the treatment plan at this time.   Note Disclaimer: At [facility], we believe that sharing information builds trust and better  relationships. You are receiving this note because you recently visited [facility]. It is possible you will see health information before a provider has talked with you about it. This kind of information can be easy to misunderstand. To help you fully understand what it means for your health, we urge you to discuss this note with your provider.Note dictated utilizing Dragon Dictation. Appropriate PPE worn during patient interactions.        Final diagnoses:   Right sided abdominal pain       ED Disposition  ED Disposition       ED Disposition   Discharge    Condition   Stable    Comment   --               Daniel Robles MD  [address] [address] [phone]    Schedule an appointment as soon as possible for a visit       [facility] EMERGENCY DEPARTMENT  [facility] [address] [phone]    As needed         Medication List      No changes were made to your prescriptions during this visit.            Tessy Wu, APRN  12/17/23 0301

## 2024-01-29 ENCOUNTER — HOSPITAL ENCOUNTER (EMERGENCY)
Facility: HOSPITAL | Age: 29
Discharge: HOME | End: 2024-01-29
Attending: EMERGENCY MEDICINE | Admitting: EMERGENCY MEDICINE
Payer: MEDICAID

## 2024-01-29 VITALS
OXYGEN SATURATION: 98 % | SYSTOLIC BLOOD PRESSURE: 110 MMHG | HEART RATE: 98 BPM | RESPIRATION RATE: 20 BRPM | TEMPERATURE: 98 F | WEIGHT: 182.98 LBS | DIASTOLIC BLOOD PRESSURE: 70 MMHG | BODY MASS INDEX: 33.67 KG/M2 | HEIGHT: 62 IN

## 2024-01-29 DIAGNOSIS — O20.0 THREATENED MISCARRIAGE: Primary | ICD-10-CM

## 2024-01-29 DIAGNOSIS — E87.6 HYPOKALEMIA: ICD-10-CM

## 2024-01-29 DIAGNOSIS — R74.8 ELEVATED LIVER ENZYMES: ICD-10-CM

## 2024-01-29 LAB
ABO GROUP BLD: NORMAL
ALBUMIN SERPL-MCNC: 4.1 G/DL (ref 3.5–5.2)
ALBUMIN/GLOB SERPL: 1.1 G/DL
ALP SERPL-CCNC: 85 U/L (ref 39–117)
ALT SERPL W P-5'-P-CCNC: 64 U/L (ref 1–33)
ANION GAP SERPL CALCULATED.3IONS-SCNC: 9 MMOL/L (ref 5–15)
AST SERPL-CCNC: 84 U/L (ref 1–32)
BACTERIA UR QL AUTO: NORMAL /HPF
BASOPHILS # BLD AUTO: 0 10*3/MM3 (ref 0–0.2)
BASOPHILS NFR BLD AUTO: 0.8 % (ref 0–1.5)
BILIRUB SERPL-MCNC: 0.2 MG/DL (ref 0–1.2)
BILIRUB UR QL STRIP: ABNORMAL
BUN SERPL-MCNC: 7 MG/DL (ref 6–20)
BUN/CREAT SERPL: 11.1 (ref 7–25)
CALCIUM SPEC-SCNC: 8.4 MG/DL (ref 8.6–10.5)
CHLORIDE SERPL-SCNC: 102 MMOL/L (ref 98–107)
CLARITY UR: CLEAR
CO2 SERPL-SCNC: 26 MMOL/L (ref 22–29)
COLOR UR: YELLOW
CREAT SERPL-MCNC: 0.63 MG/DL (ref 0.57–1)
DEPRECATED RDW RBC AUTO: 42.9 FL (ref 37–54)
EGFRCR SERPLBLD CKD-EPI 2021: 125.7 ML/MIN/1.73
EOSINOPHIL # BLD AUTO: 0.1 10*3/MM3 (ref 0–0.4)
EOSINOPHIL NFR BLD AUTO: 1.9 % (ref 0.3–6.2)
ERYTHROCYTE [DISTWIDTH] IN BLOOD BY AUTOMATED COUNT: 18.5 % (ref 12.3–15.4)
GLOBULIN UR ELPH-MCNC: 3.8 GM/DL
GLUCOSE SERPL-MCNC: 132 MG/DL (ref 65–99)
GLUCOSE UR STRIP-MCNC: NEGATIVE MG/DL
HCG INTACT+B SERPL-ACNC: 221 MIU/ML
HCT VFR BLD AUTO: 37.5 % (ref 34–46.6)
HGB BLD-MCNC: 12 G/DL (ref 12–15.9)
HGB UR QL STRIP.AUTO: ABNORMAL
HYALINE CASTS UR QL AUTO: NORMAL /LPF
KETONES UR QL STRIP: NEGATIVE
LEUKOCYTE ESTERASE UR QL STRIP.AUTO: NEGATIVE
LYMPHOCYTES # BLD AUTO: 1.4 10*3/MM3 (ref 0.7–3.1)
LYMPHOCYTES NFR BLD AUTO: 42.7 % (ref 19.6–45.3)
MCH RBC QN AUTO: 21.2 PG (ref 26.6–33)
MCHC RBC AUTO-ENTMCNC: 32 G/DL (ref 31.5–35.7)
MCV RBC AUTO: 66.5 FL (ref 79–97)
MONOCYTES # BLD AUTO: 0.3 10*3/MM3 (ref 0.1–0.9)
MONOCYTES NFR BLD AUTO: 9.5 % (ref 5–12)
NEUTROPHILS NFR BLD AUTO: 1.4 10*3/MM3 (ref 1.7–7)
NEUTROPHILS NFR BLD AUTO: 45.1 % (ref 42.7–76)
NITRITE UR QL STRIP: NEGATIVE
NRBC BLD AUTO-RTO: 0.1 /100 WBC (ref 0–0.2)
NUMBER OF DOSES: NORMAL
PH UR STRIP.AUTO: 6 [PH] (ref 5–8)
PLATELET # BLD AUTO: 260 10*3/MM3 (ref 140–450)
PMV BLD AUTO: 7.6 FL (ref 6–12)
POTASSIUM SERPL-SCNC: 2.9 MMOL/L (ref 3.5–5.2)
PROT SERPL-MCNC: 7.9 G/DL (ref 6–8.5)
PROT UR QL STRIP: ABNORMAL
RBC # BLD AUTO: 5.65 10*6/MM3 (ref 3.77–5.28)
RBC # UR STRIP: NORMAL /HPF
REF LAB TEST METHOD: NORMAL
RH BLD: POSITIVE
SODIUM SERPL-SCNC: 137 MMOL/L (ref 136–145)
SP GR UR STRIP: 1.02 (ref 1–1.03)
SQUAMOUS #/AREA URNS HPF: NORMAL /HPF
UROBILINOGEN UR QL STRIP: ABNORMAL
WBC # UR STRIP: NORMAL /HPF
WBC NRBC COR # BLD AUTO: 3.2 10*3/MM3 (ref 3.4–10.8)

## 2024-01-29 PROCEDURE — 86901 BLOOD TYPING SEROLOGIC RH(D): CPT | Performed by: NURSE PRACTITIONER

## 2024-01-29 PROCEDURE — 85025 COMPLETE CBC W/AUTO DIFF WBC: CPT | Performed by: NURSE PRACTITIONER

## 2024-01-29 PROCEDURE — 87086 URINE CULTURE/COLONY COUNT: CPT | Performed by: NURSE PRACTITIONER

## 2024-01-29 PROCEDURE — 99283 EMERGENCY DEPT VISIT LOW MDM: CPT

## 2024-01-29 PROCEDURE — 81001 URINALYSIS AUTO W/SCOPE: CPT | Performed by: NURSE PRACTITIONER

## 2024-01-29 PROCEDURE — 80053 COMPREHEN METABOLIC PANEL: CPT | Performed by: NURSE PRACTITIONER

## 2024-01-29 PROCEDURE — 86900 BLOOD TYPING SEROLOGIC ABO: CPT | Performed by: NURSE PRACTITIONER

## 2024-01-29 PROCEDURE — 84702 CHORIONIC GONADOTROPIN TEST: CPT | Performed by: NURSE PRACTITIONER

## 2024-01-29 RX ORDER — POTASSIUM CHLORIDE 20 MEQ/1
40 TABLET, EXTENDED RELEASE ORAL ONCE
Status: COMPLETED | OUTPATIENT
Start: 2024-01-29 | End: 2024-01-29

## 2024-01-29 RX ORDER — PNV NO.95/FERROUS FUM/FOLIC AC 28MG-0.8MG
1 TABLET ORAL DAILY
Qty: 30 TABLET | Refills: 0 | Status: SHIPPED | OUTPATIENT
Start: 2024-01-29

## 2024-01-29 RX ORDER — SODIUM CHLORIDE 0.9 % (FLUSH) 0.9 %
10 SYRINGE (ML) INJECTION AS NEEDED
Status: DISCONTINUED | OUTPATIENT
Start: 2024-01-29 | End: 2024-01-30 | Stop reason: HOSPADM

## 2024-01-29 RX ADMIN — POTASSIUM CHLORIDE 40 MEQ: 1500 TABLET, EXTENDED RELEASE ORAL at 23:48

## 2024-01-30 LAB — BACTERIA SPEC AEROBE CULT: NORMAL

## 2024-01-30 NOTE — ED NOTES
Pt was seen in PIT area where an IV was placed and labs were obtained. Pt was instructed to not place anything into the IV and to alert staff if she decided to leave prior to room placement.

## 2024-01-30 NOTE — ED PROVIDER NOTES
Subjective   History of Present Illness  28-year-old female who is 5 weeks pregnant per LMP presents with 1 episode of vaginal spotting.  Patient states at 7 PM she wiped after using the bathroom and saw small amount of blood on the toilet paper.  This has not recurred since that time.  No associated pain or dizziness.  Patient also has been ill with a suspected viral syndrome over the past 1 week but is feeling better.  Patient's spouse had COVID.  This likely explains the mild elevation of LFTs and hypokalemia in the setting of decreased p.o. intake.  Patient plans to follow-up with Dr. Parra with obstetrics.  Patient is not taking her Lexapro or birth control medicine.  Prenatal vitamins with iron will be prescribed.  Patient will hold her supplementary iron until she sees Dr. Parra.      Review of Systems   HENT:  Positive for congestion.    Respiratory:  Positive for cough.    Genitourinary:         As per HPI   Neurological:  Positive for headaches.       Past Medical History:   Diagnosis Date    Anxiety associated with depression     Ectopic pregnancy     Hidradenitis        Allergies   Allergen Reactions    Bactrim [Sulfamethoxazole-Trimethoprim] Hives       Past Surgical History:   Procedure Laterality Date    CHOLECYSTECTOMY  2016    ECTOPIC PREGNANCY  2019    MASS EXCISION Right 8/11/2023    Procedure: SKIN LESION EXCISION, right axilla, patent supine with arm up;  Surgeon: Dion Carmona MD;  Location: [facility] MAIN OR;  Service: General;  Laterality: Right;    TEAR DUCT SURGERY Bilateral     as a baby    TONSILLECTOMY      young child       Family History   Problem Relation Age of Onset    Hypertension Father     Diabetes Father     Cancer Maternal Grandmother     Cancer Maternal Grandfather        Social History     Socioeconomic History    Marital status:      Spouse name: annamarie correa    Number of children: 3    Years of education: 14   Tobacco Use    Smoking status: Former     Packs/day:  0.50     Years: 2.00     Additional pack years: 0.00     Total pack years: 1.00     Types: Cigarettes     Quit date: 2016     Years since quittin.0     Passive exposure: Never    Smokeless tobacco: Never   Vaping Use    Vaping Use: Never used   Substance and Sexual Activity    Alcohol use: Not Currently    Drug use: Never    Sexual activity: Yes     Partners: Male           Objective   Physical Exam  Constitutional:       Appearance: Normal appearance.   HENT:      Head: Normocephalic and atraumatic.      Mouth/Throat:      Mouth: Mucous membranes are moist.      Pharynx: Oropharynx is clear.   Cardiovascular:      Rate and Rhythm: Normal rate and regular rhythm.      Heart sounds: Normal heart sounds.   Pulmonary:      Effort: Pulmonary effort is normal.      Breath sounds: Normal breath sounds.   Abdominal:      General: Bowel sounds are normal. There is no distension.      Palpations: Abdomen is soft.      Tenderness: There is no abdominal tenderness.   Musculoskeletal:         General: No swelling or tenderness. Normal range of motion.   Skin:     General: Skin is warm and dry.      Capillary Refill: Capillary refill takes less than 2 seconds.   Neurological:      General: No focal deficit present.      Mental Status: She is alert and oriented to person, place, and time.   Psychiatric:         Mood and Affect: Mood normal.         Behavior: Behavior normal.         Procedures           ED Course                                             Medical Decision Making  Well-appearing Rh+ female with vaginal spotting x 1.  Quant to 21, ultrasound likely to be inconclusive.  I discussed this in detail with the patient the need to follow-up closely with her OB doctor for 48-hour recheck.  Patient understands if she cannot get this done she can return to the ED.  Strict return precautions reviewed in detail including any worsening bleeding or pain or dizziness or syncope.    Problems Addressed:  Elevated liver  enzymes: complicated acute illness or injury  Hypokalemia: complicated acute illness or injury  Threatened miscarriage: complicated acute illness or injury    Risk  OTC drugs.  Prescription drug management.        Final diagnoses:   Threatened miscarriage   Hypokalemia   Elevated liver enzymes       ED Disposition  ED Disposition       ED Disposition   Discharge    Condition   Stable    Comment   --               Fany Parra MD  [address] [facility] [place] [address] [phone]      Call for recheck Beta HCG in 48 hours         Medication List        New Prescriptions      prenatal vitamin 28-0.8 28-0.8 MG tablet tablet  Take 1 tablet by mouth Daily.               Where to Get Your Medications        These medications were sent to [facility] [address] [place] [address] [place] - [address] [place] [address] - [phone]  - [phone]   [address] [facility] [place] [address]      Phone: [phone]   prenatal vitamin 28-0.8 28-0.8 MG tablet tablet            Cleveland Moya MD  01/29/24 7052

## 2024-01-31 ENCOUNTER — HOSPITAL ENCOUNTER (EMERGENCY)
Facility: HOSPITAL | Age: 29
Discharge: HOME | End: 2024-01-31
Admitting: EMERGENCY MEDICINE
Payer: MEDICAID

## 2024-01-31 VITALS
DIASTOLIC BLOOD PRESSURE: 66 MMHG | RESPIRATION RATE: 18 BRPM | BODY MASS INDEX: 33.55 KG/M2 | OXYGEN SATURATION: 97 % | TEMPERATURE: 97.6 F | HEART RATE: 95 BPM | HEIGHT: 62 IN | WEIGHT: 182.32 LBS | SYSTOLIC BLOOD PRESSURE: 104 MMHG

## 2024-01-31 DIAGNOSIS — O20.0 THREATENED MISCARRIAGE: Primary | ICD-10-CM

## 2024-01-31 DIAGNOSIS — N93.9 VAGINAL BLEEDING: ICD-10-CM

## 2024-01-31 LAB
BASOPHILS # BLD AUTO: 0 10*3/MM3 (ref 0–0.2)
BASOPHILS NFR BLD AUTO: 0.2 % (ref 0–1.5)
DEPRECATED RDW RBC AUTO: 45.9 FL (ref 37–54)
EOSINOPHIL # BLD AUTO: 0 10*3/MM3 (ref 0–0.4)
EOSINOPHIL NFR BLD AUTO: 0.8 % (ref 0.3–6.2)
ERYTHROCYTE [DISTWIDTH] IN BLOOD BY AUTOMATED COUNT: 18.7 % (ref 12.3–15.4)
HCG INTACT+B SERPL-ACNC: 338.1 MIU/ML
HCT VFR BLD AUTO: 35.9 % (ref 34–46.6)
HGB BLD-MCNC: 11.5 G/DL (ref 12–15.9)
LYMPHOCYTES # BLD AUTO: 1.4 10*3/MM3 (ref 0.7–3.1)
LYMPHOCYTES NFR BLD AUTO: 31.1 % (ref 19.6–45.3)
MCH RBC QN AUTO: 21.2 PG (ref 26.6–33)
MCHC RBC AUTO-ENTMCNC: 32.1 G/DL (ref 31.5–35.7)
MCV RBC AUTO: 66.1 FL (ref 79–97)
MONOCYTES # BLD AUTO: 0.4 10*3/MM3 (ref 0.1–0.9)
MONOCYTES NFR BLD AUTO: 8.9 % (ref 5–12)
NEUTROPHILS NFR BLD AUTO: 2.7 10*3/MM3 (ref 1.7–7)
NEUTROPHILS NFR BLD AUTO: 59 % (ref 42.7–76)
NRBC BLD AUTO-RTO: 0 /100 WBC (ref 0–0.2)
PLATELET # BLD AUTO: 238 10*3/MM3 (ref 140–450)
PMV BLD AUTO: 7.4 FL (ref 6–12)
RBC # BLD AUTO: 5.43 10*6/MM3 (ref 3.77–5.28)
WBC NRBC COR # BLD AUTO: 4.6 10*3/MM3 (ref 3.4–10.8)

## 2024-01-31 PROCEDURE — 84702 CHORIONIC GONADOTROPIN TEST: CPT | Performed by: PHYSICIAN ASSISTANT

## 2024-01-31 PROCEDURE — 25810000003 SODIUM CHLORIDE 0.9 % SOLUTION: Performed by: PHYSICIAN ASSISTANT

## 2024-01-31 PROCEDURE — 85025 COMPLETE CBC W/AUTO DIFF WBC: CPT | Performed by: PHYSICIAN ASSISTANT

## 2024-01-31 PROCEDURE — 99283 EMERGENCY DEPT VISIT LOW MDM: CPT

## 2024-01-31 RX ORDER — SODIUM CHLORIDE 0.9 % (FLUSH) 0.9 %
10 SYRINGE (ML) INJECTION AS NEEDED
Status: DISCONTINUED | OUTPATIENT
Start: 2024-01-31 | End: 2024-01-31 | Stop reason: HOSPADM

## 2024-01-31 RX ADMIN — SODIUM CHLORIDE 500 ML: 9 INJECTION, SOLUTION INTRAVENOUS at 08:57

## 2024-01-31 NOTE — ED PROVIDER NOTES
Subjective   History of Present Illness  Patient is a 28-year-old female who presents with vaginal bleeding she reports is painless.  She reports that she was seen in the ER 2 days ago she was told if she had worsening symptoms she can return she called her OB/GYN's office this morning who did not offer to see her in office today so she came here.  No pain she is a  currently 5 weeks pregnant by last menstrual period she reports to me she is just here to get that blood work rechecked.        Review of Systems   Constitutional:  Negative for chills, diaphoresis, fatigue and fever.   HENT:  Negative for congestion, ear pain, sinus pressure, sore throat, trouble swallowing and voice change.    Respiratory:  Negative for cough.    Cardiovascular:  Negative for chest pain and palpitations.   Gastrointestinal:  Negative for abdominal distention, nausea and vomiting.   Genitourinary:  Positive for vaginal bleeding. Negative for menstrual problem, pelvic pain, vaginal discharge and vaginal pain.   Musculoskeletal: Negative.    Skin: Negative.    Neurological: Negative.    Psychiatric/Behavioral: Negative.         Past Medical History:   Diagnosis Date    Anxiety associated with depression     Ectopic pregnancy     Hidradenitis        Allergies   Allergen Reactions    Bactrim [Sulfamethoxazole-Trimethoprim] Hives       Past Surgical History:   Procedure Laterality Date    CHOLECYSTECTOMY  2016    ECTOPIC PREGNANCY  2019    MASS EXCISION Right 2023    Procedure: SKIN LESION EXCISION, right axilla, patent supine with arm up;  Surgeon: Dion Carmona MD;  Location: [facility];  Service: General;  Laterality: Right;    TEAR DUCT SURGERY Bilateral     as a baby    TONSILLECTOMY      young child       Family History   Problem Relation Age of Onset    Hypertension Father     Diabetes Father     Cancer Maternal Grandmother     Cancer Maternal Grandfather        Social History     Socioeconomic History    Marital  "status:      Spouse name: annamarie correa    Number of children: 3    Years of education: 14   Tobacco Use    Smoking status: Former     Packs/day: 0.50     Years: 2.00     Additional pack years: 0.00     Total pack years: 1.00     Types: Cigarettes     Quit date:      Years since quittin.0     Passive exposure: Never    Smokeless tobacco: Never   Vaping Use    Vaping Use: Never used   Substance and Sexual Activity    Alcohol use: Not Currently    Drug use: Never    Sexual activity: Yes     Partners: Male           Objective   Physical Exam  Vitals and nursing note reviewed.   Constitutional:       Appearance: She is well-developed.   HENT:      Head: Normocephalic and atraumatic.      Nose: Nose normal.   Eyes:      Pupils: Pupils are equal, round, and reactive to light.   Pulmonary:      Effort: Pulmonary effort is normal.   Genitourinary:     Comments: Patient declined a pelvic exam  Musculoskeletal:         General: Normal range of motion.      Cervical back: Normal range of motion.   Skin:     General: Skin is warm and dry.   Neurological:      General: No focal deficit present.      Mental Status: She is alert and oriented to person, place, and time.   Psychiatric:         Mood and Affect: Mood normal.         Behavior: Behavior normal.         Thought Content: Thought content normal.         Judgment: Judgment normal.         Procedures           ED Course                                             Medical Decision Making  Appropriate PPE worn during exam.    /66 (BP Location: Left arm, Patient Position: Sitting)   Pulse 95   Temp 97.6 °F (36.4 °C)   Resp 17   Ht 157.5 cm (62\")   Wt 82.7 kg (182 lb 5.1 oz)   LMP 2023   SpO2 97%   BMI 33.35 kg/m²      Co-morbidities --  has a past medical history of Anxiety associated with depression, Ectopic pregnancy, and Hidradenitis.  Radiology interpretation --  X-rays reviewed by me and interpreted by radiologist:  No radiology results for " the last day      Reviewed patient's blood work from 2 days ago when she was in the emergency room.    Presents with painless vaginal bleeding her hCG quant today is 338.  2 days ago was 221.  She is close to doubling.  I recommended she follow-up with her OB/GYN for further testing ultrasound and blood work she was stable in agreement with plan.  She declined a pelvic exam she reports that she is not having any pain she is only here because she was not able to get in with her OB/GYN for blood work.  I discussed the findings and recommendations with the patient who voices understanding. Stable while in the ER.     Note Disclaimer: At [facility], we believe that sharing information builds trust and better relationships. You are receiving this note because you are receiving care at [facility] or recently visited. It is possible you will see health information before a provider has talked with you about it. This kind of information can be easy to misunderstand. To help you fully understand what it means for your health, we urge you to discuss this note with your provider.        Problems Addressed:  Threatened miscarriage: complicated acute illness or injury  Vaginal bleeding: complicated acute illness or injury    Amount and/or Complexity of Data Reviewed  Labs: ordered.    Risk  Prescription drug management.        Final diagnoses:   Threatened miscarriage   Vaginal bleeding       ED Disposition  ED Disposition       ED Disposition   Discharge    Condition   Stable    Comment   --               Fany Parra MD  [address] [place] [address] [place] [id number] [phone]    Call   For further management         Medication List      No changes were made to your prescriptions during this visit.            Griselda Manriquez PA-C  01/31/24 1012

## 2024-01-31 NOTE — DISCHARGE INSTRUCTIONS
Return to the ER for any worsening symptoms follow-up with OB/GYN for further workup and management.

## 2024-02-06 ENCOUNTER — HOSPITAL ENCOUNTER (EMERGENCY)
Facility: HOSPITAL | Age: 29
Discharge: HOME | End: 2024-02-06
Attending: EMERGENCY MEDICINE | Admitting: EMERGENCY MEDICINE
Payer: MEDICAID

## 2024-02-06 ENCOUNTER — APPOINTMENT (OUTPATIENT)
Dept: ULTRASOUND IMAGING | Facility: HOSPITAL | Age: 29
End: 2024-02-06
Payer: MEDICAID

## 2024-02-06 VITALS
BODY MASS INDEX: 34.4 KG/M2 | WEIGHT: 186.95 LBS | SYSTOLIC BLOOD PRESSURE: 98 MMHG | OXYGEN SATURATION: 93 % | HEIGHT: 62 IN | DIASTOLIC BLOOD PRESSURE: 61 MMHG | TEMPERATURE: 98.7 F | HEART RATE: 100 BPM | RESPIRATION RATE: 18 BRPM

## 2024-02-06 DIAGNOSIS — N93.9 VAGINAL BLEEDING: Primary | ICD-10-CM

## 2024-02-06 DIAGNOSIS — O20.0 THREATENED MISCARRIAGE: ICD-10-CM

## 2024-02-06 LAB
ABO GROUP BLD: NORMAL
ANION GAP SERPL CALCULATED.3IONS-SCNC: 11 MMOL/L (ref 5–15)
BACTERIA UR QL AUTO: NORMAL /HPF
BASOPHILS # BLD AUTO: 0.1 10*3/MM3 (ref 0–0.2)
BASOPHILS NFR BLD AUTO: 0.9 % (ref 0–1.5)
BILIRUB UR QL STRIP: NEGATIVE
BUN SERPL-MCNC: 8 MG/DL (ref 6–20)
BUN/CREAT SERPL: 15.7 (ref 7–25)
CALCIUM SPEC-SCNC: 8.8 MG/DL (ref 8.6–10.5)
CHLORIDE SERPL-SCNC: 104 MMOL/L (ref 98–107)
CLARITY UR: CLEAR
CO2 SERPL-SCNC: 25 MMOL/L (ref 22–29)
COLOR UR: YELLOW
CREAT SERPL-MCNC: 0.51 MG/DL (ref 0.57–1)
DEPRECATED RDW RBC AUTO: 46.4 FL (ref 37–54)
EGFRCR SERPLBLD CKD-EPI 2021: 132.2 ML/MIN/1.73
EOSINOPHIL # BLD AUTO: 0.1 10*3/MM3 (ref 0–0.4)
EOSINOPHIL NFR BLD AUTO: 1.6 % (ref 0.3–6.2)
ERYTHROCYTE [DISTWIDTH] IN BLOOD BY AUTOMATED COUNT: 18.9 % (ref 12.3–15.4)
GLUCOSE SERPL-MCNC: 94 MG/DL (ref 65–99)
GLUCOSE UR STRIP-MCNC: NEGATIVE MG/DL
HCG INTACT+B SERPL-ACNC: 261.8 MIU/ML
HCT VFR BLD AUTO: 33.8 % (ref 34–46.6)
HGB BLD-MCNC: 10.4 G/DL (ref 12–15.9)
HGB UR QL STRIP.AUTO: ABNORMAL
HYALINE CASTS UR QL AUTO: NORMAL /LPF
KETONES UR QL STRIP: ABNORMAL
LEUKOCYTE ESTERASE UR QL STRIP.AUTO: NEGATIVE
LYMPHOCYTES # BLD AUTO: 2.1 10*3/MM3 (ref 0.7–3.1)
LYMPHOCYTES NFR BLD AUTO: 29.4 % (ref 19.6–45.3)
MCH RBC QN AUTO: 20.6 PG (ref 26.6–33)
MCHC RBC AUTO-ENTMCNC: 30.8 G/DL (ref 31.5–35.7)
MCV RBC AUTO: 66.9 FL (ref 79–97)
MONOCYTES # BLD AUTO: 0.7 10*3/MM3 (ref 0.1–0.9)
MONOCYTES NFR BLD AUTO: 10.2 % (ref 5–12)
NEUTROPHILS NFR BLD AUTO: 4.2 10*3/MM3 (ref 1.7–7)
NEUTROPHILS NFR BLD AUTO: 57.9 % (ref 42.7–76)
NITRITE UR QL STRIP: NEGATIVE
NRBC BLD AUTO-RTO: 0.1 /100 WBC (ref 0–0.2)
NUMBER OF DOSES: NORMAL
PH UR STRIP.AUTO: 6 [PH] (ref 5–8)
PLATELET # BLD AUTO: 395 10*3/MM3 (ref 140–450)
PMV BLD AUTO: 7.6 FL (ref 6–12)
POTASSIUM SERPL-SCNC: 3.5 MMOL/L (ref 3.5–5.2)
PROT UR QL STRIP: NEGATIVE
RBC # BLD AUTO: 5.06 10*6/MM3 (ref 3.77–5.28)
RBC # UR STRIP: NORMAL /HPF
REF LAB TEST METHOD: NORMAL
RH BLD: POSITIVE
SODIUM SERPL-SCNC: 140 MMOL/L (ref 136–145)
SP GR UR STRIP: 1.02 (ref 1–1.03)
SQUAMOUS #/AREA URNS HPF: NORMAL /HPF
UROBILINOGEN UR QL STRIP: ABNORMAL
WBC # UR STRIP: NORMAL /HPF
WBC NRBC COR # BLD AUTO: 7.2 10*3/MM3 (ref 3.4–10.8)

## 2024-02-06 PROCEDURE — 76801 OB US < 14 WKS SINGLE FETUS: CPT

## 2024-02-06 PROCEDURE — 86900 BLOOD TYPING SEROLOGIC ABO: CPT | Performed by: NURSE PRACTITIONER

## 2024-02-06 PROCEDURE — 86901 BLOOD TYPING SEROLOGIC RH(D): CPT | Performed by: NURSE PRACTITIONER

## 2024-02-06 PROCEDURE — 81001 URINALYSIS AUTO W/SCOPE: CPT | Performed by: NURSE PRACTITIONER

## 2024-02-06 PROCEDURE — 93976 VASCULAR STUDY: CPT

## 2024-02-06 PROCEDURE — 85025 COMPLETE CBC W/AUTO DIFF WBC: CPT | Performed by: NURSE PRACTITIONER

## 2024-02-06 PROCEDURE — 84702 CHORIONIC GONADOTROPIN TEST: CPT | Performed by: NURSE PRACTITIONER

## 2024-02-06 PROCEDURE — 76817 TRANSVAGINAL US OBSTETRIC: CPT

## 2024-02-06 PROCEDURE — 80048 BASIC METABOLIC PNL TOTAL CA: CPT | Performed by: NURSE PRACTITIONER

## 2024-02-06 PROCEDURE — 87086 URINE CULTURE/COLONY COUNT: CPT | Performed by: NURSE PRACTITIONER

## 2024-02-06 PROCEDURE — 36415 COLL VENOUS BLD VENIPUNCTURE: CPT

## 2024-02-06 PROCEDURE — 99284 EMERGENCY DEPT VISIT MOD MDM: CPT

## 2024-02-07 LAB — BACTERIA SPEC AEROBE CULT: NORMAL

## 2024-02-07 NOTE — ED NOTES
Pt arrived via PV c/o vaginal bleeding for 2 weeks. Pt reports she is 6weeks and 2 days pregnant. Pt state she was 2 previous times for bleeding but reports increased bleeding. Pt reports she has not had the chance to follow up with her OBGYN

## 2024-02-07 NOTE — ED PROVIDER NOTES
Subjective     Chief Complaint   Patient presents with    Vaginal Bleeding - Pregnant     Daniel Robles MD      History of Present Illness  Patient is a 28-year-old pregnant female presents the emergency department with complaint of vaginal bleeding.  Patient denies any abdominal pain or pelvic pain.  No vaginal discharge.  No fevers.  No urinary complaints    Patient is G6, P4.  She reports that she is supposed to be seen by Dr. Parra.  Yet been evaluated in the office  Review of Systems   Constitutional:  Negative for chills and fever.   Respiratory:  Negative for shortness of breath.    Cardiovascular:  Negative for chest pain and leg swelling.   Genitourinary:  Positive for vaginal bleeding. Negative for difficulty urinating and pelvic pain.   Musculoskeletal:  Negative for back pain and neck pain.   Skin:  Negative for color change and rash.   Neurological:  Negative for headaches.       Past Medical History:   Diagnosis Date    Anxiety associated with depression     Ectopic pregnancy     Hidradenitis        Allergies   Allergen Reactions    Bactrim [Sulfamethoxazole-Trimethoprim] Hives       Past Surgical History:   Procedure Laterality Date    CHOLECYSTECTOMY  2016    ECTOPIC PREGNANCY  2019    MASS EXCISION Right 8/11/2023    Procedure: SKIN LESION EXCISION, right axilla, patent supine with arm up;  Surgeon: Dion Carmona MD;  Location: [facility];  Service: General;  Laterality: Right;    TEAR DUCT SURGERY Bilateral     as a baby    TONSILLECTOMY      young child       Family History   Problem Relation Age of Onset    Hypertension Father     Diabetes Father     Cancer Maternal Grandmother     Cancer Maternal Grandfather        Social History     Socioeconomic History    Marital status:      Spouse name: annamarie correa    Number of children: 3    Years of education: 14   Tobacco Use    Smoking status: Former     Packs/day: 0.50     Years: 2.00     Additional pack years: 0.00     Total pack  years: 1.00     Types: Cigarettes     Quit date:      Years since quittin.1     Passive exposure: Never    Smokeless tobacco: Never   Vaping Use    Vaping Use: Never used   Substance and Sexual Activity    Alcohol use: Not Currently    Drug use: Never    Sexual activity: Yes     Partners: Male           Objective   Physical Exam  Vitals and nursing note reviewed.   Constitutional:       Appearance: Normal appearance. She is not toxic-appearing.   HENT:      Head: Normocephalic and atraumatic.      Nose: Nose normal.      Mouth/Throat:      Mouth: Mucous membranes are moist.      Pharynx: Oropharynx is clear.   Eyes:      Extraocular Movements: Extraocular movements intact.      Conjunctiva/sclera: Conjunctivae normal.      Pupils: Pupils are equal, round, and reactive to light.   Cardiovascular:      Rate and Rhythm: Normal rate and regular rhythm.      Heart sounds: Normal heart sounds. No murmur heard.     No friction rub. No gallop.   Pulmonary:      Effort: Pulmonary effort is normal.      Breath sounds: Normal breath sounds.   Abdominal:      General: Bowel sounds are normal.      Palpations: Abdomen is soft.   Genitourinary:     Comments: She was placed in the lithotomy position external genitalia were found to have no lesions or swelling.  Speculum exam shows closed cervix. Scant bleeding noted.  The patient had no cervical motion tenderness.  Patient had no adnexal tenderness.  The patient had cultures obtained and her exam was performed with JULIUS River at the bedside.  Musculoskeletal:         General: Normal range of motion.      Cervical back: Normal range of motion and neck supple.   Skin:     General: Skin is warm and dry.      Capillary Refill: Capillary refill takes less than 2 seconds.      Findings: No erythema or rash.   Neurological:      Mental Status: She is alert and oriented to person, place, and time.         Procedures           ED Course  ED Course as of 24b  "06, 2024 2233 HCG Quantitative: 261.80  Decreased from previous [LB]      ED Course User Index  [LB] Tessy Wu APRN      /56   Pulse 91   Temp 98.7 °F (37.1 °C)   Resp 16   Ht 157.5 cm (62\")   Wt 84.8 kg (186 lb 15.2 oz)   LMP 12/25/2023   SpO2 100%   BMI 34.19 kg/m²   Medications - No data to display  US Ob Transvaginal    Result Date: 2/6/2024  Impression: No intrauterine gestational sac is visualized. Given heterogeneous, thickened endometrium and downtrending beta hCG, constellation of findings are most suggestive of miscarriage. Early intrauterine pregnancy or occult ectopic pregnancy are less likely differential considerations. Recommend trending of beta-hCG values, close OB/GYN follow-up, and repeat pelvic ultrasound as clinically indicated. Electronically Signed: Nayan Naranjo MD  2/6/2024 10:09 PM EST  Workstation ID: [id number]    US Ob < 14 Weeks Single or First Gestation    Result Date: 2/6/2024  Impression: No intrauterine gestational sac is visualized. Given heterogeneous, thickened endometrium and downtrending beta hCG, constellation of findings are most suggestive of miscarriage. Early intrauterine pregnancy or occult ectopic pregnancy are less likely differential considerations. Recommend trending of beta-hCG values, close OB/GYN follow-up, and repeat pelvic ultrasound as clinically indicated. Electronically Signed: Nayan Naranjo MD  2/6/2024 10:09 PM EST  Workstation ID: [id number]   Lab Results (last 24 hours)       Procedure Component Value Units Date/Time    CBC & Differential [[id number]]  (Abnormal) Collected: 02/06/24 2043    Specimen: Blood Updated: 02/06/24 2115    Narrative:      The following orders were created for panel order CBC & Differential.  Procedure                               Abnormality         Status                     ---------                               -----------         ------                     CBC Auto Differential[[id number]]        " Abnormal            Final result               Scan Slide[[id number]]                                                                    Please view results for these tests on the individual orders.    Basic Metabolic Panel [[id number]]  (Abnormal) Collected: 02/06/24 2043    Specimen: Blood Updated: 02/06/24 2114     Glucose 94 mg/dL      BUN 8 mg/dL      Creatinine 0.51 mg/dL      Sodium 140 mmol/L      Potassium 3.5 mmol/L      Chloride 104 mmol/L      CO2 25.0 mmol/L      Calcium 8.8 mg/dL      BUN/Creatinine Ratio 15.7     Anion Gap 11.0 mmol/L      eGFR 132.2 mL/min/1.73     Narrative:      GFR Normal >60  Chronic Kidney Disease <60  Kidney Failure <15      hCG, Quantitative, Pregnancy [[id number]] Collected: 02/06/24 2043    Specimen: Blood Updated: 02/06/24 2116     HCG Quantitative 261.80 mIU/mL     Narrative:      HCG Ranges by Gestational Age    Females - non-pregnant premenopausal   </= 1mIU/mL HCG  Females - postmenopausal               </= 7mIU/mL HCG    3 Weeks       5.4   -      72 mIU/mL  4 Weeks      10.2   -     708 mIU/mL  5 Weeks       217   -   8,245 mIU/mL  6 Weeks       152   -  32,177 mIU/mL  7 Weeks     4,059   - 153,767 mIU/mL  8 Weeks    31,366   - 149,094 mIU/mL  9 Weeks    59,109   - 135,901 mIU/mL  10 Weeks   44,186   - 170,409 mIU/mL  12 Weeks   27,107   - 201,615 mIU/mL  14 Weeks   24,302   -  93,646 mIU/mL  15 Weeks   12,540   -  69,747 mIU/mL  16 Weeks    8,904   -  55,332 mIU/mL  17 Weeks    8,240   -  51,793 mIU/mL  18 Weeks    9,649   -  55,271 mIU/mL      Urinalysis With Culture If Indicated - Urine, Clean Catch [[id number]]  (Abnormal) Collected: 02/06/24 2043    Specimen: Urine, Clean Catch Updated: 02/06/24 2054     Color, UA Yellow     Appearance, UA Clear     pH, UA 6.0     Specific Gravity, UA 1.025     Glucose, UA Negative     Ketones, UA Trace     Bilirubin, UA Negative     Blood, UA Moderate (2+)     Protein, UA Negative     Leuk Esterase, UA Negative     Nitrite, UA  Negative     Urobilinogen, UA 1.0 E.U./dL    Narrative:      In absence of clinical symptoms, the presence of pyuria, bacteria, and/or nitrites on the urinalysis result does not correlate with infection.    CBC Auto Differential [[id number]]  (Abnormal) Collected: 02/06/24 2043    Specimen: Blood Updated: 02/06/24 2115     WBC 7.20 10*3/mm3      RBC 5.06 10*6/mm3      Hemoglobin 10.4 g/dL      Hematocrit 33.8 %      MCV 66.9 fL      MCH 20.6 pg      MCHC 30.8 g/dL      RDW 18.9 %      RDW-SD 46.4 fl      MPV 7.6 fL      Platelets 395 10*3/mm3      Neutrophil % 57.9 %      Lymphocyte % 29.4 %      Monocyte % 10.2 %      Eosinophil % 1.6 %      Basophil % 0.9 %      Neutrophils, Absolute 4.20 10*3/mm3      Lymphocytes, Absolute 2.10 10*3/mm3      Monocytes, Absolute 0.70 10*3/mm3      Eosinophils, Absolute 0.10 10*3/mm3      Basophils, Absolute 0.10 10*3/mm3      nRBC 0.1 /100 WBC     Urinalysis, Microscopic Only - Urine, Clean Catch [[id number]] Collected: 02/06/24 2043    Specimen: Urine, Clean Catch Updated: 02/06/24 2054     RBC, UA 0-2 /HPF      WBC, UA 0-2 /HPF      Bacteria, UA None Seen /HPF      Squamous Epithelial Cells, UA 0-2 /HPF      Hyaline Casts, UA 0-2 /LPF      Methodology Automated Microscopy    Urine Culture - Urine, Urine, Clean Catch [[id number]] Collected: 02/06/24 2043    Specimen: Urine, Clean Catch Updated: 02/06/24 2051                                                 Medical Decision Making  Problems Addressed:  Threatened miscarriage: complicated acute illness or injury  Vaginal bleeding: complicated acute illness or injury    Amount and/or Complexity of Data Reviewed  Labs: ordered. Decision-making details documented in ED Course.  Radiology: ordered.    Chart Review: Quantitative hCG 1/31/2024 338  Labs: CBC CMP reviewed.  Patient does have a history of anemia.  She is Rh+.  No RhoGAM indicated.  Quantitative hCG 261.8, which is actually decreased from previous  Imaging: US Ob  Transvaginal    Result Date: 2/6/2024  Impression: No intrauterine gestational sac is visualized. Given heterogeneous, thickened endometrium and downtrending beta hCG, constellation of findings are most suggestive of miscarriage. Early intrauterine pregnancy or occult ectopic pregnancy are less likely differential considerations. Recommend trending of beta-hCG values, close OB/GYN follow-up, and repeat pelvic ultrasound as clinically indicated. Electronically Signed: Nayan Naranjo MD  2/6/2024 10:09 PM EST  Workstation ID: [id number]     Ob < 14 Weeks Single or First Gestation    Result Date: 2/6/2024  Impression: No intrauterine gestational sac is visualized. Given heterogeneous, thickened endometrium and downtrending beta hCG, constellation of findings are most suggestive of miscarriage. Early intrauterine pregnancy or occult ectopic pregnancy are less likely differential considerations. Recommend trending of beta-hCG values, close OB/GYN follow-up, and repeat pelvic ultrasound as clinically indicated. Electronically Signed: Nayan Naranjo MD  2/6/2024 10:09 PM EST  Workstation ID: [id number]   28-year-old female presents the ED for the above complaint  Differential diagnoses considered for patient presentation, this list is not all inclusive of diagnoses considered: Ectopic pregnancy, subchorionic hemorrhage.  Concerning for miscarriage given patient's downtrending hCG.  She is benign nontender abdominal exam, no hemorrhage noted on exam.  Patient be discharged from the ED and continue follow-up outpatient with OB/GYN advised her to call tomorrow.  Disposition: Patient I discussed  plan of care, emergency department findings, including incidental findings on radiology exams, and labs,  discharge instructions, any prescription medications, follow-up at the bedside.  We discussed return precautions to the ED. patient agrees with current treatment plan.  No questions or concerns.  Note Disclaimer: At [facility], we  believe that sharing information builds trust and better relationships. You are receiving this note because you recently visited [facility]. It is possible you will see health information before a provider has talked with you about it. This kind of information can be easy to misunderstand. To help you fully understand what it means for your health, we urge you to discuss this note with your provider.Note dictated utilizing Dragon Dictation. Appropriate PPE worn during patient interactions.        Final diagnoses:   Vaginal bleeding   Threatened miscarriage       ED Disposition  ED Disposition       ED Disposition   Discharge    Condition   Stable    Comment   --               [facility] EMERGENCY DEPARTMENT  [address] [facility] [address] [phone]    If symptoms worsen, As needed    PATIENT CONNECTION - [facility] [address]  [phone]  Schedule an appointment as soon as possible for a visit   Call for assistance with follow up with Primary care provider-call tomorrow.         Medication List      No changes were made to your prescriptions during this visit.            Tessy Wu, MARICRUZ  02/06/24 0162

## 2024-02-07 NOTE — DISCHARGE INSTRUCTIONS
Call OB about GYN in the morning to discuss follow-up visit, repeat evaluation  Return to the ED for new or worsening symptoms: Specifically bleeding more than 1 pad per hour, fevers, severe pain.

## 2024-07-07 ENCOUNTER — HOSPITAL ENCOUNTER (EMERGENCY)
Facility: HOSPITAL | Age: 29
Discharge: HOME | End: 2024-07-07
Attending: EMERGENCY MEDICINE | Admitting: EMERGENCY MEDICINE
Payer: MEDICAID

## 2024-07-07 VITALS
RESPIRATION RATE: 16 BRPM | WEIGHT: 171.96 LBS | DIASTOLIC BLOOD PRESSURE: 66 MMHG | HEART RATE: 102 BPM | OXYGEN SATURATION: 96 % | HEIGHT: 62 IN | SYSTOLIC BLOOD PRESSURE: 113 MMHG | BODY MASS INDEX: 31.64 KG/M2 | TEMPERATURE: 97.6 F

## 2024-07-07 DIAGNOSIS — N30.01 ACUTE CYSTITIS WITH HEMATURIA: Primary | ICD-10-CM

## 2024-07-07 DIAGNOSIS — Z3A.12 12 WEEKS GESTATION OF PREGNANCY: ICD-10-CM

## 2024-07-07 LAB
BACTERIA UR QL AUTO: ABNORMAL /HPF
BILIRUB UR QL STRIP: ABNORMAL
CLARITY UR: ABNORMAL
COLOR UR: ABNORMAL
GLUCOSE UR STRIP-MCNC: NEGATIVE MG/DL
HGB UR QL STRIP.AUTO: ABNORMAL
HYALINE CASTS UR QL AUTO: ABNORMAL /LPF
KETONES UR QL STRIP: ABNORMAL
LEUKOCYTE ESTERASE UR QL STRIP.AUTO: ABNORMAL
NITRITE UR QL STRIP: NEGATIVE
PH UR STRIP.AUTO: 6.5 [PH] (ref 5–8)
PROT UR QL STRIP: ABNORMAL
RBC # UR STRIP: ABNORMAL /HPF
REF LAB TEST METHOD: ABNORMAL
SP GR UR STRIP: 1.02 (ref 1–1.03)
SQUAMOUS #/AREA URNS HPF: ABNORMAL /HPF
UROBILINOGEN UR QL STRIP: ABNORMAL
WBC # UR STRIP: ABNORMAL /HPF

## 2024-07-07 PROCEDURE — 81001 URINALYSIS AUTO W/SCOPE: CPT

## 2024-07-07 PROCEDURE — 87086 URINE CULTURE/COLONY COUNT: CPT | Performed by: EMERGENCY MEDICINE

## 2024-07-07 PROCEDURE — 99283 EMERGENCY DEPT VISIT LOW MDM: CPT

## 2024-07-07 RX ORDER — CEPHALEXIN 500 MG/1
500 CAPSULE ORAL 2 TIMES DAILY
Qty: 9 CAPSULE | Refills: 0 | Status: SHIPPED | OUTPATIENT
Start: 2024-07-07

## 2024-07-07 RX ORDER — CEPHALEXIN 500 MG/1
500 CAPSULE ORAL ONCE
Status: COMPLETED | OUTPATIENT
Start: 2024-07-07 | End: 2024-07-07

## 2024-07-07 RX ADMIN — CEPHALEXIN 500 MG: 500 CAPSULE ORAL at 08:28

## 2024-07-07 NOTE — DISCHARGE INSTRUCTIONS
Plenty of fluids.  Follow-up with obstetrician, return new or worsening symptoms increased pain fever vomiting or as needed
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
## 2024-07-07 NOTE — ED PROVIDER NOTES
Subjective   History of Present Illness  29-year-old female presents with urinary frequency urgency.  She states it started this morning.  She has had no flank pain.  She is currently 12 weeks pregnant reports no vaginal discharge or bleeding.  She has had ultrasound per obstetrician confirmed IUP.  Review of Systems    Past Medical History:   Diagnosis Date    Anxiety associated with depression     Ectopic pregnancy     Hidradenitis        Allergies   Allergen Reactions    Bactrim [Sulfamethoxazole-Trimethoprim] Hives       Past Surgical History:   Procedure Laterality Date    CHOLECYSTECTOMY  2016    ECTOPIC PREGNANCY  2019    MASS EXCISION Right 2023    Procedure: SKIN LESION EXCISION, right axilla, patent supine with arm up;  Surgeon: Dion Carmona MD;  Location: [facility] MAIN OR;  Service: General;  Laterality: Right;    TEAR DUCT SURGERY Bilateral     as a baby    TONSILLECTOMY      young child       Family History   Problem Relation Age of Onset    Hypertension Father     Diabetes Father     Cancer Maternal Grandmother     Cancer Maternal Grandfather        Social History     Socioeconomic History    Marital status:      Spouse name: annamarie correa    Number of children: 3    Years of education: 14   Tobacco Use    Smoking status: Former     Current packs/day: 0.00     Average packs/day: 0.5 packs/day for 2.0 years (1.0 ttl pk-yrs)     Types: Cigarettes     Start date:      Quit date: 2016     Years since quittin.5     Passive exposure: Never    Smokeless tobacco: Never   Vaping Use    Vaping status: Never Used   Substance and Sexual Activity    Alcohol use: Not Currently    Drug use: Never    Sexual activity: Yes     Partners: Male     Prior to Admission medications    Medication Sig Start Date End Date Taking? Authorizing Provider   escitalopram (LEXAPRO) 10 MG tablet Take 1 tablet by mouth Daily. 23   Provider, MD Maggie   ferrous sulfate 325 (65 FE) MG tablet Take 1 tablet by  "mouth Daily With Breakfast.    Provider, MD Maggie   Prenatal Vit-Fe Fumarate-FA (prenatal vitamin 28-0.8) 28-0.8 MG tablet tablet Take 1 tablet by mouth Daily. 1/29/24   Cleveland Moya MD Zafejoshua 150-35 MCG/24HR Place 1 patch on the skin as directed by provider 1 (One) Time Per Week. 6/12/23   ProviderMaggie MD           Objective   Physical Exam  29-year-old female awake alert.  Generally well-developed well-nourished.  No CVA tenderness.  Abdomen is soft with some mild suprapubic tenderness.  Skin without rash noted  Procedures           ED Course      Results for orders placed or performed during the hospital encounter of 07/07/24   Urinalysis With Microscopic If Indicated (No Culture) - Urine, Clean Catch    Specimen: Urine, Clean Catch   Result Value Ref Range    Color, UA Dark Yellow (A) Yellow, Straw    Appearance, UA Turbid (A) Clear    pH, UA 6.5 5.0 - 8.0    Specific Gravity, UA 1.024 1.005 - 1.030    Glucose, UA Negative Negative    Ketones, UA 40 mg/dL (2+) (A) Negative    Bilirubin, UA Small (1+) (A) Negative    Blood, UA Large (3+) (A) Negative    Protein, UA >=300 mg/dL (3+) (A) Negative    Leuk Esterase, UA Large (3+) (A) Negative    Nitrite, UA Negative Negative    Urobilinogen, UA 2.0 E.U./dL (A) 0.2 - 1.0 E.U./dL   Urinalysis, Microscopic Only - Urine, Clean Catch    Specimen: Urine, Clean Catch   Result Value Ref Range    RBC, UA Too Numerous to Count (A) None Seen, 0-2 /HPF    WBC, UA Too Numerous to Count (A) None Seen, 0-2 /HPF    Bacteria, UA 1+ (A) None Seen /HPF    Squamous Epithelial Cells, UA 0-2 None Seen, 0-2 /HPF    Hyaline Casts, UA 0-2 None Seen /LPF    Methodology Automated Microscopy      No radiology results for the last day  Medications   cephalexin (KEFLEX) capsule 500 mg (has no administration in time range)     /66   Pulse 102   Temp 97.6 °F (36.4 °C)   Resp 16   Ht 157.5 cm (62\")   Wt 78 kg (171 lb 15.3 oz)   LMP 12/25/2023   SpO2 96%   BMI 31.45 " kg/m²                                          Medical Decision Making  Problems Addressed:  12 weeks gestation of pregnancy: complicated acute illness or injury  Acute cystitis with hematuria: complicated acute illness or injury    Risk  Prescription drug management.      Urinalysis reveals tenderness to count red cells tenderness to count white cells 1+ bacteria this will be sent for culture.  Patient was given dose of Keflex here.  She was discharged with Keflex x 5 days.  Urine was sent for culture.  Advised to return new or worsening symptoms  Final diagnoses:   Acute cystitis with hematuria   12 weeks gestation of pregnancy       ED Disposition  ED Disposition       ED Disposition   Discharge    Condition   Stable    Comment   --               Fany Parra MD  [facility] [place] [address] [phone]               Medication List        New Prescriptions      cephalexin 500 MG capsule  Commonly known as: KEFLEX  Take 1 capsule by mouth 2 (Two) Times a Day.               Where to Get Your Medications        These medications were sent to [facility] [address] [place] [address] [place] - [address] [place] [address] - [phone]  - [phone]   [address] [facility] [place] [address]      Phone: [phone]   cephalexin 500 MG capsule            Gonzalez Seo MD  07/07/24 3469

## 2024-07-08 LAB — BACTERIA SPEC AEROBE CULT: NORMAL

## 2024-08-03 ENCOUNTER — REFERRAL TRIAGE (OUTPATIENT)
Dept: LABOR AND DELIVERY | Facility: HOSPITAL | Age: 29
End: 2024-08-03
Payer: MEDICAID

## 2024-08-05 ENCOUNTER — PATIENT OUTREACH (OUTPATIENT)
Dept: LABOR AND DELIVERY | Facility: HOSPITAL | Age: 29
End: 2024-08-05
Payer: MEDICAID

## 2024-08-08 ENCOUNTER — PATIENT OUTREACH (OUTPATIENT)
Dept: LABOR AND DELIVERY | Facility: HOSPITAL | Age: 29
End: 2024-08-08
Payer: MEDICAID

## 2024-08-08 NOTE — OUTREACH NOTE
[facility]  Enrollment    Current Estimated Gestational Age: 17w1d    Questions/Answers      Flowsheet Row Responses   Would like to participate? Yes   Date of Intake Visit 08/13/24                Blanca Zavala RN  Maternity Nurse Navigator    8/8/2024, 16:29 EDT

## 2024-08-13 ENCOUNTER — PATIENT OUTREACH (OUTPATIENT)
Dept: LABOR AND DELIVERY | Facility: HOSPITAL | Age: 29
End: 2024-08-13
Payer: MEDICAID

## 2024-08-13 NOTE — OUTREACH NOTE
[facility]  Unable to Reach       Questions/Answers      Flowsheet Row Responses   Pending Outreach Intake Visit   Call Attempt First   Outcome Left message, DinnerTimet message sent to patient                Blanca Zavala RN  Maternity Nurse Navigator    8/13/2024, 13:21 EDT

## 2024-08-14 ENCOUNTER — OFFICE VISIT (OUTPATIENT)
Dept: ENDOCRINOLOGY | Facility: CLINIC | Age: 29
End: 2024-08-14
Payer: MEDICAID

## 2024-08-14 ENCOUNTER — LAB (OUTPATIENT)
Dept: LAB | Facility: HOSPITAL | Age: 29
End: 2024-08-14
Payer: MEDICAID

## 2024-08-14 ENCOUNTER — PATIENT ROUNDING (BHMG ONLY) (OUTPATIENT)
Dept: ENDOCRINOLOGY | Facility: CLINIC | Age: 29
End: 2024-08-14
Payer: MEDICAID

## 2024-08-14 ENCOUNTER — PATIENT OUTREACH (OUTPATIENT)
Dept: LABOR AND DELIVERY | Facility: HOSPITAL | Age: 29
End: 2024-08-14
Payer: MEDICAID

## 2024-08-14 VITALS
WEIGHT: 171 LBS | BODY MASS INDEX: 31.47 KG/M2 | SYSTOLIC BLOOD PRESSURE: 98 MMHG | HEIGHT: 62 IN | HEART RATE: 106 BPM | OXYGEN SATURATION: 98 % | DIASTOLIC BLOOD PRESSURE: 66 MMHG

## 2024-08-14 DIAGNOSIS — Z3A.18 18 WEEKS GESTATION OF PREGNANCY: Primary | ICD-10-CM

## 2024-08-14 DIAGNOSIS — R94.6 ABNORMAL THYROID FUNCTION TEST: ICD-10-CM

## 2024-08-14 PROBLEM — Z34.90 PREGNANT: Status: RESOLVED | Noted: 2023-04-02 | Resolved: 2024-08-14

## 2024-08-14 LAB
FREE T4, MATERNAL: 1.18 NG/DL
T3FREE SERPL-MCNC: 3.17 PG/ML (ref 2–4.4)
TSH SERPL DL<=0.05 MIU/L-ACNC: 0.13 UIU/ML (ref 0.27–4.2)

## 2024-08-14 PROCEDURE — 86376 MICROSOMAL ANTIBODY EACH: CPT | Performed by: INTERNAL MEDICINE

## 2024-08-14 PROCEDURE — 84445 ASSAY OF TSI GLOBULIN: CPT | Performed by: INTERNAL MEDICINE

## 2024-08-14 PROCEDURE — 84481 FREE ASSAY (FT-3): CPT | Performed by: INTERNAL MEDICINE

## 2024-08-14 PROCEDURE — 36415 COLL VENOUS BLD VENIPUNCTURE: CPT | Performed by: INTERNAL MEDICINE

## 2024-08-14 PROCEDURE — 84439 ASSAY OF FREE THYROXINE: CPT | Performed by: INTERNAL MEDICINE

## 2024-08-14 PROCEDURE — 84443 ASSAY THYROID STIM HORMONE: CPT | Performed by: INTERNAL MEDICINE

## 2024-08-14 RX ORDER — BUPROPION HYDROCHLORIDE 150 MG/1
TABLET ORAL
COMMUNITY
Start: 2024-06-11

## 2024-08-14 NOTE — PROGRESS NOTES
August 14, 2024    Hello, may I speak with Myrtle Clifford?    My name is yaneth      I am  with [facility]  [address] [facility] [place] [address].    Before we get started may I verify your date of birth? 1995    I am calling to officially welcome you to our practice and ask about your recent visit. Is this a good time to talk? yes    Tell me about your visit with us. What things went well?  everything       We're always looking for ways to make our patients' experiences even better. Do you have recommendations on ways we may improve?  no    Overall were you satisfied with your first visit to our practice? yes       I appreciate you taking the time to speak with me today. Is there anything else I can do for you? no      Thank you, and have a great day.

## 2024-08-14 NOTE — OUTREACH NOTE
[facility]  Intake    Current Estimated Gestational Age: 18w0d    Intake Assessment      Flowsheet Row Responses   Currently Employed Yes  [full time [employer]]   Able to keep appointments as scheduled Yes   Gender(s) and Name(s) Girl   Do you have a dentist? Yes   Dentist Name [facility]   Have you seen a dentist in the last 6 months No  [encouraged to call for healthy check up]   Resources Presently Utilizing: WIC (Women, Infant, Children), Other   Other Resources Utilizing: SNAP   Maternal Warning Signs Provided  [Sent in [employer]]   Other: Provided  [sent in [place]]            Learning Assessment      Flowsheet Row Responses   Relationship Patient   Learner Name Myrtle   Does the learner have any barriers to learning? No Barriers   What is the preferred language of the learner for medical teaching? English   Is an  required? No   How does the learner prefer to learn new concepts? Reading, Pictures/Video            Tobacco, Alcohol, and Drug History     reports that she quit smoking about 8 years ago. Her smoking use included cigarettes. She started smoking about 10 years ago. She has a 1 pack-year smoking history. She has never been exposed to tobacco smoke. She has never used smokeless tobacco.   reports that she does not currently use alcohol.   reports no history of drug use.    Blanca Zavala RN  Maternity Nurse Navigator    8/14/2024, 11:30 EDT

## 2024-08-15 ENCOUNTER — PATIENT ROUNDING (BHMG ONLY) (OUTPATIENT)
Dept: ENDOCRINOLOGY | Facility: CLINIC | Age: 29
End: 2024-08-15
Payer: MEDICAID

## 2024-08-15 LAB — THYROPEROXIDASE AB SERPL-ACNC: <9 IU/ML (ref 0–34)

## 2024-08-15 NOTE — PROGRESS NOTES
August 15, 2024    Hello, may I speak with Myrtle Clifford?    My name is Rosemarie    I am  with [facility]  [address] [facility] [place] [address].    Before we get started may I verify your date of birth? 1995    I am calling to officially welcome you to our practice and ask about your recent visit. Is this a good time to talk? Yes    Tell me about your visit with us. What things went well?  very much info       We're always looking for ways to make our patients' experiences even better. Do you have recommendations on ways we may improve?  no    Overall were you satisfied with your first visit to our practice? no       I appreciate you taking the time to speak with me today. Is there anything else I can do for you? yes      Thank you, and have a great day.

## 2024-08-17 LAB — TSI SER-ACNC: <0.1 IU/L (ref 0–0.55)

## 2024-08-17 NOTE — PROGRESS NOTES
[facility]    Referring Provider: Dr. Fany Parra  Reason for Consultation: Thyroid evaluation & management.    Patient Care Team:  Daniel Robles MD as PCP - General (Family Medicine)  Dion Carmona MD as Consulting Physician (General Surgery)  Blanca Condon, RN as Nurse Navigator (Obstetrics)    Chief complaint Thyroid Problem (Np / Thyroid dysfunction, Antepartum )      Subjective .     History of present illness:    This is a  29 y.o. female seen for low TSH level.  Currently 18 weeks gestation, unplanned. Due date Fly 15, 2025.  So far has lost 20 lb this pregnancy.  Walks @ work.  Occasional palpitations for years. No tremors, insomnia or double vision.  Grandmother with thyroid problem.  Menarche @ age 11y, regular menses. LMP 2024.  [id number] 1 ectopic pregnancy(2019). Biggest baby 8 lb 10 oz, full term. No gestational DM.  Taking prenatal vitamins. No steroids or biotin.recently.    Review of Systems  Review of Systems   Constitutional:  Positive for unexpected weight loss.   HENT:  Negative for trouble swallowing.    Eyes:  Negative for blurred vision.   Cardiovascular:  Positive for palpitations.   Gastrointestinal:  Negative for nausea.   Endocrine: Negative for polyuria.   Neurological:  Negative for tremors and headache.   Psychiatric/Behavioral:  Negative for sleep disturbance.      History  Past Medical History:   Diagnosis Date    Anxiety associated with depression     Ectopic pregnancy     Hidradenitis      Past Surgical History:   Procedure Laterality Date    CHOLECYSTECTOMY  2016    CYST REMOVAL      ECTOPIC PREGNANCY  2019    MASS EXCISION Right 2023    Procedure: SKIN LESION EXCISION, right axilla, patent supine with arm up;  Surgeon: Dion Carmona MD;  Location: [facility] MAIN OR;  Service: General;  Laterality: Right;    TEAR DUCT SURGERY Bilateral     as a baby    TONSILLECTOMY      young child     Family History   Problem Relation Age of Onset     Diabetes Mother     Hypertension Father     Diabetes Father     Hyperlipidemia Father     Diabetes Maternal Grandmother     Cancer Maternal Grandmother     Diabetes Maternal Grandfather     Cancer Maternal Grandfather      Social History     Tobacco Use    Smoking status: Former     Current packs/day: 0.00     Average packs/day: 0.5 packs/day for 2.0 years (1.0 ttl pk-yrs)     Types: Cigarettes     Start date:      Quit date:      Years since quittin.6     Passive exposure: Never    Smokeless tobacco: Never   Vaping Use    Vaping status: Never Used   Substance Use Topics    Alcohol use: Not Currently    Drug use: Never         Allergies:  Bactrim [sulfamethoxazole-trimethoprim]    Objective     Vital Signs      Vitals:    24 1412   BP: 98/66   Pulse: 106   SpO2: 98%         Physical Exam:     General Appearance:    Alert, cooperative, in no acute distress   Head:    Normocephalic, without obvious abnormality, atraumatic   Eyes:            No lid lag, conjunctivae and sclerae normal, no   icterus, no pallor, corneas clear, PERRLA   Throat:   No oral lesions,  oral mucosa moist   Neck:   34 cm in circumference, thyroid soft, no carotid bruit   Lungs:     Clear     Heart:    Regular rhythm and normal rate   Chest Wall:    No abnormalities observed   Abdomen:     Normal bowel sounds, soft. 18 wks gestation                 Extremities:   Moves all extremities well, no edema or tremors              Pulses:   Pulses palpable and equal bilaterally   Skin:   Dry   Neurologic:  DTR 1+       Results Review  I have reviewed the patient's new clinical results, labs & imaging.    TSH 0.05, FreeT4 1.3 on 2024    Lab Results   Component Value Date    TSH 0.132 (L) 2024     FreeT4, maternal,  FreeT3 3.17; TPO <9; TSI pending    Assessment & Plan     Abnormal thyroid test, improving  Pregnancy, 18 weeks    Will recheck in thyroid labs in 1 month.  Will call you with the lab results  Info on thyroid &  pregnancy given to pt.  I discussed the patients findings and my recommendations with patient.    Maylin Ardon MD  08/16/24  20:51 EDT

## 2024-10-24 ENCOUNTER — PATIENT OUTREACH (OUTPATIENT)
Dept: LABOR AND DELIVERY | Facility: HOSPITAL | Age: 29
End: 2024-10-24
Payer: MEDICAID

## 2024-10-24 NOTE — OUTREACH NOTE
Reports all good, had GTT today and scheduled for PCS 25. Discussed her questions regarding CS and coming to hospital and stay for CS.  Discussed I will be calling to arrange preadmissions for CS when its closer.  Ordered breastpump today. Still needs baby supplies, resources sent in [employer]. Encouraged to call or message anytime for questions, she VU    Attachments sent in [employer] :   labor   Problems to watch for during pregnancy   Third trimester    Delivery and after CS care   Urgent maternal warning signs  Kick Counts    Videos sent in [employer]:  Problems to watch for during pregnancy    Labor   Third trimester   Delivery

## 2024-11-02 ENCOUNTER — PATIENT OUTREACH (OUTPATIENT)
Dept: LABOR AND DELIVERY | Facility: HOSPITAL | Age: 29
End: 2024-11-02
Payer: MEDICAID

## 2024-11-02 NOTE — OUTREACH NOTE
[facility]  Check-In    Current Estimated Gestational Age: 29w3d      Questions/Answers      Flowsheet Row Responses   Best Method for Contacting Cell   Demographics Reviewed Yes   Currently Employed Yes   Able to keep appointments as scheduled Yes   Baby Active/Feeling Fetal Movemen Yes  [states can see her move on her stomac]   Supplies ready for baby Breast Pump   Do you have any questions related to your care experience, your pregnancy, plans for delivery, any concerns, etc? No   Other Education Other   Other Education Comment kick counts, PTL and coming to hospital          States she has noticed some beckie stock, reports they go away.  Discussed PTL and coming to hospital and doing kick counts, come to hospital for contractions, LOF, bleeding, decreased FM, she verbalized understanding.   Blanca Zavala RN  Maternity Nurse Navigator    11/2/2024, 14:14 EDT

## 2024-11-11 ENCOUNTER — HOSPITAL ENCOUNTER (OUTPATIENT)
Facility: HOSPITAL | Age: 29
Discharge: HOME | End: 2024-11-11
Attending: STUDENT IN AN ORGANIZED HEALTH CARE EDUCATION/TRAINING PROGRAM | Admitting: STUDENT IN AN ORGANIZED HEALTH CARE EDUCATION/TRAINING PROGRAM
Payer: MEDICAID

## 2024-11-11 VITALS
WEIGHT: 167.11 LBS | SYSTOLIC BLOOD PRESSURE: 105 MMHG | HEART RATE: 97 BPM | RESPIRATION RATE: 16 BRPM | TEMPERATURE: 98.3 F | DIASTOLIC BLOOD PRESSURE: 65 MMHG | HEIGHT: 62 IN | OXYGEN SATURATION: 100 % | BODY MASS INDEX: 30.75 KG/M2

## 2024-11-11 LAB
BILIRUB UR QL STRIP: NEGATIVE
CLARITY UR: CLEAR
COLOR UR: ABNORMAL
GLUCOSE UR STRIP-MCNC: NEGATIVE MG/DL
HGB UR QL STRIP.AUTO: NEGATIVE
KETONES UR QL STRIP: NEGATIVE
LEUKOCYTE ESTERASE UR QL STRIP.AUTO: NEGATIVE
NITRITE UR QL STRIP: NEGATIVE
PH UR STRIP.AUTO: 7 [PH] (ref 5–8)
PROT UR QL STRIP: NEGATIVE
SP GR UR STRIP: 1.01 (ref 1–1.03)
UROBILINOGEN UR QL STRIP: ABNORMAL

## 2024-11-11 PROCEDURE — 81003 URINALYSIS AUTO W/O SCOPE: CPT | Performed by: STUDENT IN AN ORGANIZED HEALTH CARE EDUCATION/TRAINING PROGRAM

## 2024-11-11 PROCEDURE — G0463 HOSPITAL OUTPT CLINIC VISIT: HCPCS

## 2024-11-11 RX ORDER — HYDROXYZINE HYDROCHLORIDE 25 MG/1
25 TABLET, FILM COATED ORAL EVERY 12 HOURS PRN
Qty: 60 TABLET | Refills: 1 | Status: SHIPPED | OUTPATIENT
Start: 2024-11-11

## 2024-11-11 NOTE — NON STRESS TEST
Obstetrical Non-stress Test Interpretation     Name:  Myrtle Clifford  MRN: [id number]    29 y.o. female  at 30w5d    Indication: Per MD order; pt c/o contractions      Baseline: 135  Variability:   moderate  Accelerations: 15x15  Decelerations: absent  Contractions:  No contractions       Impression:  reactive; category 1. Strip reviewed with Dr. Parra.       Ronda Graves RN  2024  14:50 EST

## 2024-12-17 ENCOUNTER — APPOINTMENT (OUTPATIENT)
Dept: GENERAL RADIOLOGY | Facility: HOSPITAL | Age: 29
End: 2024-12-17
Payer: MEDICAID

## 2024-12-17 ENCOUNTER — HOSPITAL ENCOUNTER (EMERGENCY)
Facility: HOSPITAL | Age: 29
Discharge: HOME | End: 2024-12-18
Attending: EMERGENCY MEDICINE | Admitting: EMERGENCY MEDICINE
Payer: MEDICAID

## 2024-12-17 DIAGNOSIS — R11.2 NAUSEA AND VOMITING, UNSPECIFIED VOMITING TYPE: Primary | ICD-10-CM

## 2024-12-17 LAB
ALBUMIN SERPL-MCNC: 3.2 G/DL (ref 3.5–5.2)
ALP SERPL-CCNC: 252 U/L (ref 39–117)
ALT SERPL W P-5'-P-CCNC: 6 U/L (ref 1–33)
ANION GAP SERPL CALCULATED.3IONS-SCNC: 11.1 MMOL/L (ref 5–15)
ANISOCYTOSIS BLD QL: NORMAL
AST SERPL-CCNC: 20 U/L (ref 1–32)
BACTERIA UR QL AUTO: ABNORMAL /HPF
BASOPHILS # BLD AUTO: 0.03 10*3/MM3 (ref 0–0.2)
BASOPHILS NFR BLD AUTO: 0.3 % (ref 0–1.5)
BILIRUB CONJ SERPL-MCNC: 0.3 MG/DL (ref 0–0.3)
BILIRUB INDIRECT SERPL-MCNC: 0.2 MG/DL
BILIRUB SERPL-MCNC: 0.5 MG/DL (ref 0–1.2)
BILIRUB UR QL STRIP: NEGATIVE
BUN SERPL-MCNC: 3 MG/DL (ref 6–20)
BUN/CREAT SERPL: 5.5 (ref 7–25)
CALCIUM SPEC-SCNC: 8.8 MG/DL (ref 8.6–10.5)
CHLORIDE SERPL-SCNC: 105 MMOL/L (ref 98–107)
CLARITY UR: CLEAR
CO2 SERPL-SCNC: 21.9 MMOL/L (ref 22–29)
COLOR UR: YELLOW
CREAT SERPL-MCNC: 0.55 MG/DL (ref 0.57–1)
DEPRECATED RDW RBC AUTO: 35.8 FL (ref 37–54)
EGFRCR SERPLBLD CKD-EPI 2021: 129 ML/MIN/1.73
EOSINOPHIL # BLD AUTO: 0.2 10*3/MM3 (ref 0–0.4)
EOSINOPHIL NFR BLD AUTO: 2.2 % (ref 0.3–6.2)
ERYTHROCYTE [DISTWIDTH] IN BLOOD BY AUTOMATED COUNT: 16.4 % (ref 12.3–15.4)
GLUCOSE SERPL-MCNC: 103 MG/DL (ref 65–99)
GLUCOSE UR STRIP-MCNC: NEGATIVE MG/DL
HCT VFR BLD AUTO: 29.1 % (ref 34–46.6)
HGB BLD-MCNC: 8.5 G/DL (ref 12–15.9)
HGB UR QL STRIP.AUTO: NEGATIVE
HYALINE CASTS UR QL AUTO: ABNORMAL /LPF
IMM GRANULOCYTES # BLD AUTO: 0.03 10*3/MM3 (ref 0–0.05)
IMM GRANULOCYTES NFR BLD AUTO: 0.3 % (ref 0–0.5)
KETONES UR QL STRIP: NEGATIVE
LARGE PLATELETS: NORMAL
LEUKOCYTE ESTERASE UR QL STRIP.AUTO: ABNORMAL
LIPASE SERPL-CCNC: 30 U/L (ref 13–60)
LYMPHOCYTES # BLD AUTO: 3.98 10*3/MM3 (ref 0.7–3.1)
LYMPHOCYTES NFR BLD AUTO: 44.5 % (ref 19.6–45.3)
MCH RBC QN AUTO: 18.3 PG (ref 26.6–33)
MCHC RBC AUTO-ENTMCNC: 29.2 G/DL (ref 31.5–35.7)
MCV RBC AUTO: 62.6 FL (ref 79–97)
MICROCYTES BLD QL: NORMAL
MONOCYTES # BLD AUTO: 0.72 10*3/MM3 (ref 0.1–0.9)
MONOCYTES NFR BLD AUTO: 8 % (ref 5–12)
NEUTROPHILS NFR BLD AUTO: 3.99 10*3/MM3 (ref 1.7–7)
NEUTROPHILS NFR BLD AUTO: 44.7 % (ref 42.7–76)
NITRITE UR QL STRIP: NEGATIVE
NRBC BLD AUTO-RTO: 0.2 /100 WBC (ref 0–0.2)
PH UR STRIP.AUTO: 7 [PH] (ref 5–8)
PLATELET # BLD AUTO: 237 10*3/MM3 (ref 140–450)
PMV BLD AUTO: 9.8 FL (ref 6–12)
POTASSIUM SERPL-SCNC: 3.2 MMOL/L (ref 3.5–5.2)
PROT SERPL-MCNC: 6.3 G/DL (ref 6–8.5)
PROT UR QL STRIP: NEGATIVE
RBC # BLD AUTO: 4.65 10*6/MM3 (ref 3.77–5.28)
RBC # UR STRIP: ABNORMAL /HPF
REF LAB TEST METHOD: ABNORMAL
SODIUM SERPL-SCNC: 138 MMOL/L (ref 136–145)
SP GR UR STRIP: 1.01 (ref 1–1.03)
SQUAMOUS #/AREA URNS HPF: ABNORMAL /HPF
UROBILINOGEN UR QL STRIP: ABNORMAL
WBC # UR STRIP: ABNORMAL /HPF
WBC MORPH BLD: NORMAL
WBC NRBC COR # BLD AUTO: 8.95 10*3/MM3 (ref 3.4–10.8)

## 2024-12-17 PROCEDURE — 71045 X-RAY EXAM CHEST 1 VIEW: CPT

## 2024-12-17 PROCEDURE — 85007 BL SMEAR W/DIFF WBC COUNT: CPT | Performed by: EMERGENCY MEDICINE

## 2024-12-17 PROCEDURE — 25010000002 ONDANSETRON PER 1 MG: Performed by: EMERGENCY MEDICINE

## 2024-12-17 PROCEDURE — 99283 EMERGENCY DEPT VISIT LOW MDM: CPT

## 2024-12-17 PROCEDURE — 80076 HEPATIC FUNCTION PANEL: CPT | Performed by: EMERGENCY MEDICINE

## 2024-12-17 PROCEDURE — 96374 THER/PROPH/DIAG INJ IV PUSH: CPT

## 2024-12-17 PROCEDURE — 81001 URINALYSIS AUTO W/SCOPE: CPT | Performed by: EMERGENCY MEDICINE

## 2024-12-17 PROCEDURE — 80048 BASIC METABOLIC PNL TOTAL CA: CPT | Performed by: EMERGENCY MEDICINE

## 2024-12-17 PROCEDURE — 85025 COMPLETE CBC W/AUTO DIFF WBC: CPT | Performed by: EMERGENCY MEDICINE

## 2024-12-17 PROCEDURE — 83690 ASSAY OF LIPASE: CPT | Performed by: EMERGENCY MEDICINE

## 2024-12-17 RX ORDER — SODIUM CHLORIDE 0.9 % (FLUSH) 0.9 %
10 SYRINGE (ML) INJECTION AS NEEDED
Status: DISCONTINUED | OUTPATIENT
Start: 2024-12-17 | End: 2024-12-18 | Stop reason: HOSPADM

## 2024-12-17 RX ORDER — ONDANSETRON 2 MG/ML
4 INJECTION INTRAMUSCULAR; INTRAVENOUS ONCE
Status: COMPLETED | OUTPATIENT
Start: 2024-12-17 | End: 2024-12-17

## 2024-12-17 RX ADMIN — ONDANSETRON 4 MG: 2 INJECTION INTRAMUSCULAR; INTRAVENOUS at 22:17

## 2024-12-18 VITALS
HEIGHT: 62 IN | HEART RATE: 93 BPM | BODY MASS INDEX: 30.56 KG/M2 | SYSTOLIC BLOOD PRESSURE: 122 MMHG | OXYGEN SATURATION: 97 % | TEMPERATURE: 98.2 F | RESPIRATION RATE: 20 BRPM | DIASTOLIC BLOOD PRESSURE: 76 MMHG

## 2024-12-18 PROCEDURE — 96375 TX/PRO/DX INJ NEW DRUG ADDON: CPT

## 2024-12-18 PROCEDURE — 25010000002 METOCLOPRAMIDE PER 10 MG: Performed by: EMERGENCY MEDICINE

## 2024-12-18 RX ORDER — METOCLOPRAMIDE 5 MG/1
5 TABLET ORAL 3 TIMES DAILY PRN
Qty: 15 TABLET | Refills: 0 | Status: SHIPPED | OUTPATIENT
Start: 2024-12-18

## 2024-12-18 RX ORDER — METOCLOPRAMIDE HYDROCHLORIDE 5 MG/ML
5 INJECTION INTRAMUSCULAR; INTRAVENOUS ONCE
Status: COMPLETED | OUTPATIENT
Start: 2024-12-18 | End: 2024-12-18

## 2024-12-18 RX ADMIN — METOCLOPRAMIDE 5 MG: 5 INJECTION, SOLUTION INTRAMUSCULAR; INTRAVENOUS at 00:19

## 2024-12-18 NOTE — ED PROVIDER NOTES
Subjective   History of Present Illness  29-year-old female whose 35 weeks 6 days pregnant presents for cough, congestion, vomiting.  States been going on for a month or 2 but vomiting worse.  States cough is slightly worse as well.  Has some pain in her chest when she coughs and has some more consistent abdominal pain  Review of Systems  See HPI.  Past Medical History:   Diagnosis Date    Anxiety associated with depression     Ectopic pregnancy     Hidradenitis        Allergies   Allergen Reactions    Bactrim [Sulfamethoxazole-Trimethoprim] Hives       Past Surgical History:   Procedure Laterality Date    CHOLECYSTECTOMY  2016    CYST REMOVAL      ECTOPIC PREGNANCY  2019    MASS EXCISION Right 2023    Procedure: SKIN LESION EXCISION, right axilla, patent supine with arm up;  Surgeon: Dion Carmona MD;  Location: [facility] MAIN OR;  Service: General;  Laterality: Right;    TEAR DUCT SURGERY Bilateral     as a baby    TONSILLECTOMY      young child       Family History   Problem Relation Age of Onset    Diabetes Mother     Hypertension Father     Diabetes Father     Hyperlipidemia Father     Diabetes Maternal Grandmother     Cancer Maternal Grandmother     Diabetes Maternal Grandfather     Cancer Maternal Grandfather        Social History     Socioeconomic History    Marital status: Significant Other     Spouse name: Alexsander Palacio    Number of children: 4    Years of education: 14   Tobacco Use    Smoking status: Former     Current packs/day: 0.00     Average packs/day: 0.5 packs/day for 2.0 years (1.0 ttl pk-yrs)     Types: Cigarettes     Start date:      Quit date: 2016     Years since quittin.9     Passive exposure: Never    Smokeless tobacco: Never   Vaping Use    Vaping status: Never Used   Substance and Sexual Activity    Alcohol use: Not Currently    Drug use: Never    Sexual activity: Yes     Partners: Male           Objective   Physical Exam  No acute distress, regular rate and rhythm, alert  "and oriented x 4, gravid uterus consistent with dates without tenderness to palpation, pale conjunctiva, clear to station bilaterally, no tachypnea or increased work of breathing, speaking in full sentences, trace edema to bilateral lower extremities that is symmetrical  Procedures           ED Course      /76   Pulse 93   Temp 98.2 °F (36.8 °C) (Oral)   Resp 20   Ht 157.5 cm (62\")   LMP 12/25/2023   SpO2 97%   BMI 30.56 kg/m²   Labs Reviewed   BASIC METABOLIC PANEL - Abnormal; Notable for the following components:       Result Value    Glucose 103 (*)     BUN 3 (*)     Creatinine 0.55 (*)     Potassium 3.2 (*)     CO2 21.9 (*)     BUN/Creatinine Ratio 5.5 (*)     All other components within normal limits    Narrative:     GFR Categories in Chronic Kidney Disease (CKD)      GFR Category          GFR (mL/min/1.73)    Interpretation  G1                     90 or greater         Normal or high (1)  G2                      60-89                Mild decrease (1)  G3a                   45-59                Mild to moderate decrease  G3b                   30-44                Moderate to severe decrease  G4                    15-29                Severe decrease  G5                    14 or less           Kidney failure          (1)In the absence of evidence of kidney disease, neither GFR category G1 or G2 fulfill the criteria for CKD.    eGFR calculation 2021 CKD-EPI creatinine equation, which does not include race as a factor   URINALYSIS W/ MICROSCOPIC IF INDICATED (NO CULTURE) - Abnormal; Notable for the following components:    Leuk Esterase, UA Small (1+) (*)     Urobilinogen, UA 2.0 E.U./dL (*)     All other components within normal limits   CBC WITH AUTO DIFFERENTIAL - Abnormal; Notable for the following components:    Hemoglobin 8.5 (*)     Hematocrit 29.1 (*)     MCV 62.6 (*)     MCH 18.3 (*)     MCHC 29.2 (*)     RDW 16.4 (*)     RDW-SD 35.8 (*)     Lymphocytes, Absolute 3.98 (*)     All other " components within normal limits    Narrative:     Appended report. These results have been appended to a previously verified report.   URINALYSIS, MICROSCOPIC ONLY - Abnormal; Notable for the following components:    WBC, UA 3-5 (*)     All other components within normal limits   HEPATIC FUNCTION PANEL - Abnormal; Notable for the following components:    Albumin 3.2 (*)     Alkaline Phosphatase 252 (*)     All other components within normal limits   LIPASE - Normal   SCAN SLIDE    Narrative:     Slide Reviewed     CBC AND DIFFERENTIAL    Narrative:     The following orders were created for panel order CBC & Differential.  Procedure                               Abnormality         Status                     ---------                               -----------         ------                     CBC Auto Differential[[id number]]        Abnormal            Final result               Scan Slide[[id number]]                                       Final result                 Please view results for these tests on the individual orders.     XR Chest 1 View   Final Result   No active disease.         Electronically Signed: Floyd Davis MD     12/17/2024 10:23 PM EST     Workstation ID: [id number]                                                         Medical Decision Making  My interpretation of chest x-ray is no focal infiltrate or pneumothorax.  See system for radiology interpretation.    Patient hemodynamically stable here.  Reported chest pain in triage but states to me this only happens when she coughs she has had some upper respiratory symptoms going on for over a month.  Main concern of vomiting.  This been recurrent problem.  Zofran previously helping but not now.  Abdomen benign on exam.  Elevated alkaline phosphatase but this can be normal in pregnancy and patient without significant right upper quadrant pain and remainder of liver enzymes unremarkable.  Patient denies any coffee-ground emesis.  Denies melena.  States  she is aware of her anemia.  Nausea resolved after Reglan.  Will DC with prescription for this.  Advise close follow-up with OB/GYN.    Final diagnoses:   Nausea and vomiting, unspecified vomiting type       ED Disposition  ED Disposition       ED Disposition   Discharge    Condition   Stable    Comment   --               Daniel Robles MD  [address]  [address]  [place] [address] [phone]    In 3 days           Medication List        New Prescriptions      metoclopramide 5 MG tablet  Commonly known as: REGLAN  Take 1 tablet by mouth 3 (Three) Times a Day As Needed (nausea and vomiting).               Where to Get Your Medications        These medications were sent to [facility] [address] [place] [address] [place] - [address] [place] [address] - [phone]  - [phone] [address] [place] [address]      Phone: [phone]   metoclopramide 5 MG tablet            Abelino Wright MD  12/18/24 8179

## 2024-12-18 NOTE — NON STRESS TEST
Patient presented to ED with multiple generalized complaints, including upper abdominal pain, intermittent nausea and no appetite, intermittent BH.   Due to patient being 35 weeks and 6 days, patient was evaluated with NST.   Charge RN went to evaluate patient she denies persistent contraction like pain, reports BH that are intermittent, she denies VB, LOF, and reports FM.   Patient had SVE by RN and 1/thick/ high.   NST reviewed by me:  Moderate variability  Reactive tracing.   TOCO: no regular contractions, irritability.     Once patient has been ruled out for chest pain by ED provider, okay for discharge home from obstetrics complaint unless other concerns.   I reviewed labs ordered by ED provider and all wnl for pregnancy.   I reviewed vital signs, normotensive.   Known anemia, continue PO iron,will consider IV iron outpatient for delivery planning.   Patient is scheduled for PLTCD due to history of clavicle fx and birth injury with prior delivery.   Patient has scheduled FU in office with me on this Thursday for routine PNC visit.

## 2024-12-20 ENCOUNTER — APPOINTMENT (OUTPATIENT)
Dept: CARDIOLOGY | Facility: HOSPITAL | Age: 29
End: 2024-12-20
Payer: MEDICAID

## 2024-12-20 ENCOUNTER — HOSPITAL ENCOUNTER (OUTPATIENT)
Facility: HOSPITAL | Age: 29
Setting detail: SURGERY ADMIT
Discharge: HOME | End: 2024-12-20
Attending: STUDENT IN AN ORGANIZED HEALTH CARE EDUCATION/TRAINING PROGRAM | Admitting: STUDENT IN AN ORGANIZED HEALTH CARE EDUCATION/TRAINING PROGRAM
Payer: MEDICAID

## 2024-12-20 VITALS
RESPIRATION RATE: 18 BRPM | OXYGEN SATURATION: 100 % | BODY MASS INDEX: 30.73 KG/M2 | WEIGHT: 167 LBS | TEMPERATURE: 97.9 F | HEIGHT: 62 IN | DIASTOLIC BLOOD PRESSURE: 75 MMHG | HEART RATE: 110 BPM | SYSTOLIC BLOOD PRESSURE: 113 MMHG

## 2024-12-20 LAB
ALBUMIN SERPL-MCNC: 3.2 G/DL (ref 3.5–5.2)
ALBUMIN/GLOB SERPL: 0.9 G/DL
ALP SERPL-CCNC: 266 U/L (ref 39–117)
ALT SERPL W P-5'-P-CCNC: 5 U/L (ref 1–33)
ANION GAP SERPL CALCULATED.3IONS-SCNC: 11.3 MMOL/L (ref 5–15)
AORTIC DIMENSIONLESS INDEX: 0.93 (DI)
AST SERPL-CCNC: 23 U/L (ref 1–32)
BH CV ECHO MEAS - AO MAX PG: 6.8 MMHG
BH CV ECHO MEAS - AO MEAN PG: 3 MMHG
BH CV ECHO MEAS - AO V2 MAX: 130 CM/SEC
BH CV ECHO MEAS - AO V2 VTI: 22 CM
BH CV ECHO MEAS - AVA(I,D): 2.8 CM2
BH CV ECHO MEAS - EDV(CUBED): 97.3 ML
BH CV ECHO MEAS - EDV(MOD-SP4): 112 ML
BH CV ECHO MEAS - EF(MOD-SP4): 61.2 %
BH CV ECHO MEAS - EF_3D-VOL: 59 %
BH CV ECHO MEAS - ESV(CUBED): 22 ML
BH CV ECHO MEAS - ESV(MOD-SP4): 43.5 ML
BH CV ECHO MEAS - FS: 39.1 %
BH CV ECHO MEAS - IVS/LVPW: 1 CM
BH CV ECHO MEAS - IVSD: 0.8 CM
BH CV ECHO MEAS - LA DIMENSION: 3.8 CM
BH CV ECHO MEAS - LAT PEAK E' VEL: 9.7 CM/SEC
BH CV ECHO MEAS - LV DIASTOLIC VOL/BSA (35-75): 63.3 CM2
BH CV ECHO MEAS - LV MASS(C)D: 117.9 GRAMS
BH CV ECHO MEAS - LV MAX PG: 5.9 MMHG
BH CV ECHO MEAS - LV MEAN PG: 3 MMHG
BH CV ECHO MEAS - LV SYSTOLIC VOL/BSA (12-30): 24.6 CM2
BH CV ECHO MEAS - LV V1 MAX: 121 CM/SEC
BH CV ECHO MEAS - LV V1 VTI: 21.6 CM
BH CV ECHO MEAS - LVIDD: 4.6 CM
BH CV ECHO MEAS - LVIDS: 2.8 CM
BH CV ECHO MEAS - LVOT AREA: 2.8 CM2
BH CV ECHO MEAS - LVOT DIAM: 1.9 CM
BH CV ECHO MEAS - LVPWD: 0.8 CM
BH CV ECHO MEAS - MED PEAK E' VEL: 9.4 CM/SEC
BH CV ECHO MEAS - MV A MAX VEL: 78 CM/SEC
BH CV ECHO MEAS - MV DEC TIME: 0.16 SEC
BH CV ECHO MEAS - MV E MAX VEL: 101 CM/SEC
BH CV ECHO MEAS - MV E/A: 1.29
BH CV ECHO MEAS - PA ACC TIME: 0.14 SEC
BH CV ECHO MEAS - PA V2 MAX: 95 CM/SEC
BH CV ECHO MEAS - RV MAX PG: 2.7 MMHG
BH CV ECHO MEAS - RV V1 MAX: 81.4 CM/SEC
BH CV ECHO MEAS - RV V1 VTI: 19.6 CM
BH CV ECHO MEAS - SV(LVOT): 61.2 ML
BH CV ECHO MEAS - SV(MOD-SP4): 68.5 ML
BH CV ECHO MEAS - SVI(LVOT): 34.6 ML/M2
BH CV ECHO MEAS - SVI(MOD-SP4): 38.7 ML/M2
BH CV ECHO MEAS - TAPSE (>1.6): 1.94 CM
BH CV ECHO MEAS - TR MAX PG: 15.5 MMHG
BH CV ECHO MEAS - TR MAX VEL: 197 CM/SEC
BH CV ECHO MEASUREMENTS AVERAGE E/E' RATIO: 10.58
BH CV XLRA - RV BASE: 2.7 CM
BH CV XLRA - RV LENGTH: 6.9 CM
BH CV XLRA - RV MID: 2.4 CM
BH CV XLRA - TDI S': 15.4 CM/SEC
BILIRUB SERPL-MCNC: 0.9 MG/DL (ref 0–1.2)
BUN SERPL-MCNC: 4 MG/DL (ref 6–20)
BUN/CREAT SERPL: 7.7 (ref 7–25)
CALCIUM SPEC-SCNC: 8.8 MG/DL (ref 8.6–10.5)
CHLORIDE SERPL-SCNC: 107 MMOL/L (ref 98–107)
CO2 SERPL-SCNC: 21.7 MMOL/L (ref 22–29)
CREAT SERPL-MCNC: 0.52 MG/DL (ref 0.57–1)
DEPRECATED RDW RBC AUTO: 37.6 FL (ref 37–54)
EGFRCR SERPLBLD CKD-EPI 2021: 130.8 ML/MIN/1.73
ERYTHROCYTE [DISTWIDTH] IN BLOOD BY AUTOMATED COUNT: 16.6 % (ref 12.3–15.4)
GLOBULIN UR ELPH-MCNC: 3.4 GM/DL
GLUCOSE SERPL-MCNC: 100 MG/DL (ref 65–99)
HCT VFR BLD AUTO: 30 % (ref 34–46.6)
HGB BLD-MCNC: 8.5 G/DL (ref 12–15.9)
MCH RBC QN AUTO: 18 PG (ref 26.6–33)
MCHC RBC AUTO-ENTMCNC: 28.3 G/DL (ref 31.5–35.7)
MCV RBC AUTO: 63.7 FL (ref 79–97)
PLATELET # BLD AUTO: 247 10*3/MM3 (ref 140–450)
PMV BLD AUTO: 9.5 FL (ref 6–12)
POTASSIUM SERPL-SCNC: 4.3 MMOL/L (ref 3.5–5.2)
PROT SERPL-MCNC: 6.6 G/DL (ref 6–8.5)
RBC # BLD AUTO: 4.71 10*6/MM3 (ref 3.77–5.28)
SINUS: 2.8 CM
SODIUM SERPL-SCNC: 140 MMOL/L (ref 136–145)
STJ: 2.1 CM
WBC NRBC COR # BLD AUTO: 7.21 10*3/MM3 (ref 3.4–10.8)

## 2024-12-20 PROCEDURE — 93321 DOPPLER ECHO F-UP/LMTD STD: CPT

## 2024-12-20 PROCEDURE — 93325 DOPPLER ECHO COLOR FLOW MAPG: CPT

## 2024-12-20 PROCEDURE — 93325 DOPPLER ECHO COLOR FLOW MAPG: CPT | Performed by: STUDENT IN AN ORGANIZED HEALTH CARE EDUCATION/TRAINING PROGRAM

## 2024-12-20 PROCEDURE — 93308 TTE F-UP OR LMTD: CPT

## 2024-12-20 PROCEDURE — 85027 COMPLETE CBC AUTOMATED: CPT | Performed by: STUDENT IN AN ORGANIZED HEALTH CARE EDUCATION/TRAINING PROGRAM

## 2024-12-20 PROCEDURE — 93308 TTE F-UP OR LMTD: CPT | Performed by: STUDENT IN AN ORGANIZED HEALTH CARE EDUCATION/TRAINING PROGRAM

## 2024-12-20 PROCEDURE — G0463 HOSPITAL OUTPT CLINIC VISIT: HCPCS

## 2024-12-20 PROCEDURE — 80053 COMPREHEN METABOLIC PANEL: CPT | Performed by: STUDENT IN AN ORGANIZED HEALTH CARE EDUCATION/TRAINING PROGRAM

## 2024-12-20 PROCEDURE — 93005 ELECTROCARDIOGRAM TRACING: CPT | Performed by: STUDENT IN AN ORGANIZED HEALTH CARE EDUCATION/TRAINING PROGRAM

## 2024-12-20 PROCEDURE — 99254 IP/OBS CNSLTJ NEW/EST MOD 60: CPT | Performed by: STUDENT IN AN ORGANIZED HEALTH CARE EDUCATION/TRAINING PROGRAM

## 2024-12-20 PROCEDURE — 93010 ELECTROCARDIOGRAM REPORT: CPT | Performed by: STUDENT IN AN ORGANIZED HEALTH CARE EDUCATION/TRAINING PROGRAM

## 2024-12-20 PROCEDURE — 93321 DOPPLER ECHO F-UP/LMTD STD: CPT | Performed by: STUDENT IN AN ORGANIZED HEALTH CARE EDUCATION/TRAINING PROGRAM

## 2024-12-20 NOTE — NON STRESS TEST
Obstetrical Non-stress Test Interpretation     Name:  Myrtle Clifford  MRN: [id number]    29 y.o. female  at 36w2d    Indication: tachycardia, dyspnea       Baseline: Normal 110-160 bpm  Variability:   Moderate/Normal (amplitude 6-25 bpm)  Accelerations: Present (32 weeks+) 15 x 15 bpm  Decelerations: Absent   Contractions:  Present       Impression:  Category I   Strip reviewed and agree with 2nd RN     Prieto Arnold RN  2024  12:20 EST

## 2024-12-21 LAB
QT INTERVAL: 369 MS
QTC INTERVAL: 446 MS

## 2024-12-21 NOTE — CONSULTS
CARDIOLOGY CONSULT      Requesting Provider    Fany Parra MD      PATIENT IDENTIFICATION    Name: Myrtle Clifford  Age: 29 y.o. Sex: female : 1995  MRN: [id number]    REASON FOR CONSULTATION:  Shortness of breath      HISTORY OF PRESENT ILLNESS:   Patient is a 29-year-old female who is pregnant 36 weeks, who comes to the hospital today complaining of shortness of breath associated chest pain.  Patient reports that over the past 5 days she has experienced significant nausea/vomiting approximately 4 times a day, abdominal pain with at least 2 times a day diarrhea, and progressed with palpitations, tachycardia, chest pain shortness of breath.  TTE today showed normal ejection fraction, no regional wall motion abnormality, no valvular disease.  EKG shows sinus tachycardia.    IMPRESSIONS  36 weeks pregnant  Sinus tachycardia  Shortness of breath  Chest pain  Nausea/vomiting  Diarrhea    RECOMMENDATIONS:  Patient's sinus tachycardia is likely due to volume depletion, patient admits she is not been able to eat or drink anything over the past days due to several episodes of vomiting and diarrhea.  Her chest pain is likely due to repeated episodes of vomiting over the past days.  TTE today ruled out structural heart disease.  If patient is unable to get more hydrated orally, consider gentle IV hydration  No further workup required from cardiac standpoint at this time    Medical History    Past Medical History:   Diagnosis Date    Anxiety associated with depression     Ectopic pregnancy     Hidradenitis         Surgical History    Past Surgical History:   Procedure Laterality Date    CHOLECYSTECTOMY  2016    CYST REMOVAL      ECTOPIC PREGNANCY  2019    MASS EXCISION Right 2023    Procedure: SKIN LESION EXCISION, right axilla, patent supine with arm up;  Surgeon: Dion Carmona MD;  Location: [facility];  Service: General;  Laterality: Right;    TEAR DUCT SURGERY Bilateral     as a baby    TONSILLECTOMY   "    young child        Family History    Family History   Problem Relation Age of Onset    Diabetes Mother     Hypertension Father     Diabetes Father     Hyperlipidemia Father     Diabetes Maternal Grandmother     Cancer Maternal Grandmother     Diabetes Maternal Grandfather     Cancer Maternal Grandfather        Social History    Social History     Tobacco Use    Smoking status: Former     Current packs/day: 0.00     Average packs/day: 0.5 packs/day for 2.0 years (1.0 ttl pk-yrs)     Types: Cigarettes     Start date:      Quit date:      Years since quittin.9     Passive exposure: Never    Smokeless tobacco: Never   Substance Use Topics    Alcohol use: Not Currently        Allergies    Allergies   Allergen Reactions    Bactrim [Sulfamethoxazole-Trimethoprim] Hives       REVIEW OF SYSTEMS:  Negative except for HPI    OBJECTIVE     VITAL SIGNS:  Visit Vitals  /75   Pulse 110   Temp 97.9 °F (36.6 °C) (Oral)   Resp 18   Ht 157.5 cm (62\")   Wt 75.8 kg (167 lb)   LMP 2023   SpO2 100%   BMI 30.54 kg/m²     Oxygen Therapy  SpO2: 100 %  Pulse Oximetry Type: Continuous  Device (Oxygen Therapy): room air  Flowsheet Rows      Flowsheet Row First Filed Value   Admission Height 157.5 cm (62\") Documented at 2024 1105   Admission Weight 76.1 kg (167 lb 12.3 oz) Documented at 2024 1105          Intake & Output (last 3 days)       None          Lines, Drains & Airways       Active LDAs       None                  /75   Pulse 110   Temp 97.9 °F (36.6 °C) (Oral)   Resp 18   Ht 157.5 cm (62\")   Wt 75.8 kg (167 lb)   LMP 2023   SpO2 100%   BMI 30.54 kg/m²     INTAKE/OUTPUT:    Intake/Output this shift:  No intake/output data recorded.  Intake/Output last 3 shifts:  No intake/output data recorded.      PHYSICAL EXAM:  General: Alert, cooperative, no distress, appears stated age  Lungs:  Clear to auscultation bilaterally, no wheezes, rhonchi or rales are noted  Chest wall: No " "tenderness  Heart::  Tachycardia, normal rhythm, S1 and S2 normal, no murmur.  No rub or gallop  Abdomen: Soft, nontender, nondistended, bowel sounds active  Extremities: Trace pitting edema  Pulses: 2+ and symmetric all extremities  Neuro/psych: No gross focal deficits      Scheduled Meds:          Continuous Infusions:    No current facility-administered medications for this encounter.      PRN Meds:         Data Review:     X-rays, labs reviewed personally by provider.    CBC    Results from last 7 days   Lab Units 12/20/24  1217 12/17/24  2213   WBC 10*3/mm3 7.21 8.95   HEMOGLOBIN g/dL 8.5* 8.5*   PLATELETS 10*3/mm3 247 237     Cr Clearance Estimated Creatinine Clearance: 155 mL/min (A) (by C-G formula based on SCr of 0.52 mg/dL (L)).  Coag     HbA1C No results found for: \"HGBA1C\"  Blood Glucose No results found for: \"POCGLU\"  Infection     CMP   Results from last 7 days   Lab Units 12/20/24  1217 12/17/24  2213   SODIUM mmol/L 140 138   POTASSIUM mmol/L 4.3 3.2*   CHLORIDE mmol/L 107 105   CO2 mmol/L 21.7* 21.9*   BUN mg/dL 4* 3*   CREATININE mg/dL 0.52* 0.55*   GLUCOSE mg/dL 100* 103*   ALBUMIN g/dL 3.2* 3.2*   BILIRUBIN mg/dL 0.9 0.5   ALK PHOS U/L 266* 252*   AST (SGOT) U/L 23 20   ALT (SGPT) U/L 5 6   LIPASE U/L  --  30     ABG      UA    Results from last 7 days   Lab Units 12/17/24  2228   NITRITE UA  Negative   WBC UA /HPF 3-5*   BACTERIA UA /HPF None Seen   SQUAM EPITHEL UA /HPF 0-2     TATYANA  No results found for: \"POCMETH\", \"POCAMPHET\", \"POCBARBITUR\", \"POCBENZO\", \"POCCOCAINE\", \"POCOPIATES\", \"POCOXYCODO\", \"POCPHENCYC\", \"POCPROPOXY\", \"POCTHC\", \"POCTRICYC\"  Lysis Labs   Results from last 7 days   Lab Units 12/20/24  1217 12/17/24  2213   HEMOGLOBIN g/dL 8.5* 8.5*   PLATELETS 10*3/mm3 247 237   CREATININE mg/dL 0.52* 0.55*     Radiology(recent) No radiology results for the last day            ECG/EMG Results (most recent)       Procedure Component Value Units Date/Time    ECG 12 Lead Tachycardia [[id number]] " Collected: 12/20/24 1217     Updated: 12/20/24 1219     QT Interval 369 ms      QTC Interval 446 ms     Narrative:      HEART RATE=88  bpm  RR Lxgsqbji=500  ms  AZ Utlifvuo=973  ms  P Horizontal Axis=32  deg  P Front Axis=19  deg  QRSD Pxmzuxfi=729  ms  QT Rmhlfdyq=750  ms  VBeW=092  ms  QRS Axis=15  deg  T Wave Axis=15  deg  - NORMAL ECG -  Sinus rhythm  Date and Time of Study:2024-12-20 12:17:40    Adult Transthoracic Echo Limited W/ Cont if Necessary Per Protocol [[id number]] Resulted: 12/20/24 1641     Updated: 12/20/24 1641     EF_3D-VOL 59.0 %      LVIDd 4.6 cm      LVIDs 2.8 cm      IVSd 0.80 cm      LVPWd 0.80 cm      FS 39.1 %      IVS/LVPW 1.00 cm      ESV(cubed) 22.0 ml      LV Sys Vol (BSA corrected) 24.6 cm2      EDV(cubed) 97.3 ml      LV Dahl Vol (BSA corrected) 63.3 cm2      LV mass(C)d 117.9 grams      LVOT area 2.8 cm2      LVOT diam 1.90 cm      EDV(MOD-sp4) 112.0 ml      ESV(MOD-sp4) 43.5 ml      SV(MOD-sp4) 68.5 ml      SVi(MOD-SP4) 38.7 ml/m2      SVi (LVOT) 34.6 ml/m2      EF(MOD-sp4) 61.2 %      MV E max edwardo 101.0 cm/sec      MV A max edwardo 78.0 cm/sec      MV dec time 0.16 sec      MV E/A 1.29     Med Peak E' Edwardo 9.4 cm/sec      Lat Peak E' Edwardo 9.7 cm/sec      TR max edwardo 197.0 cm/sec      Avg E/e' ratio 10.58     SV(LVOT) 61.2 ml      RV Base 2.7 cm      RV Mid 2.40 cm      RV Length 6.9 cm      TAPSE (>1.6) 1.94 cm      RV S' 15.4 cm/sec      LA dimension (2D)  3.8 cm      LV V1 max 121.0 cm/sec      LV V1 max PG 5.9 mmHg      LV V1 mean PG 3.0 mmHg      LV V1 VTI 21.6 cm      Ao pk edwardo 130.0 cm/sec      Ao max PG 6.8 mmHg      Ao mean PG 3.0 mmHg      Ao V2 VTI 22.0 cm      RORY(I,D) 2.8 cm2      TR max PG 15.5 mmHg      RV V1 max PG 2.7 mmHg      RV V1 max 81.4 cm/sec      RV V1 VTI 19.6 cm      PA V2 max 95.0 cm/sec      PA acc time 0.14 sec      Sinus 2.8 cm      STJ 2.10 cm      Dimensionless Index 0.93 (DI)     Narrative:        Left ventricular ejection fraction appears to be 56 - 60%.     Left ventricular diastolic function was normal.    Normal RV systolic function.    No significant valvular disease.    Estimated right ventricular systolic pressure from tricuspid   regurgitation is normal (<35 mmHg).    Electronically signed by James Huitron MD,   12/20/24, 4:41 PM EST.            Imaging:  Imaging Results (Last 72 Hours)       ** No results found for the last 72 hours. **              James Huitron MD  12/20/24  19:30 EST

## 2024-12-25 ENCOUNTER — HOSPITAL ENCOUNTER (OUTPATIENT)
Facility: HOSPITAL | Age: 29
Discharge: HOME | End: 2024-12-25
Attending: STUDENT IN AN ORGANIZED HEALTH CARE EDUCATION/TRAINING PROGRAM | Admitting: OBSTETRICS & GYNECOLOGY
Payer: MEDICAID

## 2024-12-25 ENCOUNTER — APPOINTMENT (OUTPATIENT)
Dept: ULTRASOUND IMAGING | Facility: HOSPITAL | Age: 29
End: 2024-12-25
Payer: MEDICAID

## 2024-12-25 VITALS
RESPIRATION RATE: 18 BRPM | OXYGEN SATURATION: 98 % | HEART RATE: 101 BPM | SYSTOLIC BLOOD PRESSURE: 110 MMHG | DIASTOLIC BLOOD PRESSURE: 72 MMHG

## 2024-12-25 PROCEDURE — G0463 HOSPITAL OUTPT CLINIC VISIT: HCPCS

## 2024-12-25 PROCEDURE — 76819 FETAL BIOPHYS PROFIL W/O NST: CPT

## 2024-12-26 ENCOUNTER — HOSPITAL ENCOUNTER (OUTPATIENT)
Facility: HOSPITAL | Age: 29
Discharge: LEFT BEFORE DISCHARGE | End: 2024-12-26
Attending: STUDENT IN AN ORGANIZED HEALTH CARE EDUCATION/TRAINING PROGRAM | Admitting: OBSTETRICS & GYNECOLOGY
Payer: MEDICAID

## 2024-12-26 VITALS
SYSTOLIC BLOOD PRESSURE: 111 MMHG | DIASTOLIC BLOOD PRESSURE: 77 MMHG | TEMPERATURE: 98.8 F | OXYGEN SATURATION: 100 % | HEART RATE: 101 BPM

## 2024-12-26 PROCEDURE — G0463 HOSPITAL OUTPT CLINIC VISIT: HCPCS

## 2024-12-26 RX ORDER — SODIUM CHLORIDE 0.9 % (FLUSH) 0.9 %
10 SYRINGE (ML) INJECTION AS NEEDED
Status: DISCONTINUED | OUTPATIENT
Start: 2024-12-26 | End: 2024-12-26 | Stop reason: HOSPADM

## 2024-12-26 RX ORDER — ONDANSETRON 2 MG/ML
8 INJECTION INTRAMUSCULAR; INTRAVENOUS ONCE
Status: DISCONTINUED | OUTPATIENT
Start: 2024-12-26 | End: 2024-12-26 | Stop reason: HOSPADM

## 2024-12-26 NOTE — NURSING NOTE
Pt has decided to leave against medical advice because she feels the treatment offered will not help. RN encouraged pt to stay to receive hydration; explained potential consequences and risks, including but not limited to diagnostic testing and treatment. Patient has a normal mental status and adequate capacity to make medical decisions. She had the opportunity to ask questions. The patient was treated to the extent that she would allow and knows that she may return for care at any time. RN stressed the importance of maintaining her OB appointment tomorrow.

## 2024-12-26 NOTE — NURSING NOTE
Pt left very upset stating no one listen to her and her concerns. RN reassured pt that we have done the tests to check her baby and everything was normal. Pt was still very upset and kept saying she knew all the tests were normal but no one would listen to her. I advised the pt to speak with Dr. Parra at her appt on 12/27 and the pt said there was no point. She stated that she was going to cancel all her appts, I reassured her that it would be best to keep her appt and speak with her doctor. Pt left very upset, Dr. Linares on call and was notified of the pts behavior and comments.

## 2024-12-26 NOTE — NURSING NOTE
"RN informed patient of MD's plans. Pt rolled eyes and said \"no one is listening\". RN reassured her that we are listening, she said that she was throwing everything up so we would be giving her fluids through an IV which is different than drinking. Pt kept rolling eyes saying she \"shouldn't have even came\".  RN asked what she was hoping to have done today - pt stated she didn't know but work made her come. Pt wishes to leave AMA  "

## 2024-12-27 ENCOUNTER — ANESTHESIA EVENT (OUTPATIENT)
Dept: LABOR AND DELIVERY | Facility: HOSPITAL | Age: 29
End: 2024-12-27
Payer: MEDICAID

## 2024-12-27 ENCOUNTER — HOSPITAL ENCOUNTER (OUTPATIENT)
Facility: HOSPITAL | Age: 29
Discharge: HOME | End: 2024-12-27
Attending: STUDENT IN AN ORGANIZED HEALTH CARE EDUCATION/TRAINING PROGRAM | Admitting: OBSTETRICS & GYNECOLOGY
Payer: MEDICAID

## 2024-12-27 ENCOUNTER — HOSPITAL ENCOUNTER (INPATIENT)
Facility: HOSPITAL | Age: 29
LOS: 3 days | Discharge: HOME | End: 2024-12-30
Attending: STUDENT IN AN ORGANIZED HEALTH CARE EDUCATION/TRAINING PROGRAM | Admitting: STUDENT IN AN ORGANIZED HEALTH CARE EDUCATION/TRAINING PROGRAM
Payer: MEDICAID

## 2024-12-27 VITALS — WEIGHT: 165.12 LBS | BODY MASS INDEX: 30.39 KG/M2 | HEIGHT: 62 IN

## 2024-12-27 PROBLEM — R94.6 ABNORMAL THYROID FUNCTION TEST: Status: RESOLVED | Noted: 2024-08-14 | Resolved: 2024-12-27

## 2024-12-27 PROBLEM — Z98.891 STATUS POST PRIMARY LOW TRANSVERSE CESAREAN SECTION: Status: ACTIVE | Noted: 2024-12-27

## 2024-12-27 PROBLEM — L02.91 ABSCESS: Status: RESOLVED | Noted: 2023-06-26 | Resolved: 2024-12-27

## 2024-12-27 PROBLEM — M54.9 BACK PAIN: Status: RESOLVED | Noted: 2023-03-20 | Resolved: 2024-12-27

## 2024-12-27 PROBLEM — Z34.90 PREGNANT AND NOT YET DELIVERED: Status: ACTIVE | Noted: 2024-12-27

## 2024-12-27 PROBLEM — O99.019 ANEMIA AFFECTING PREGNANCY: Status: ACTIVE | Noted: 2024-12-27

## 2024-12-27 PROBLEM — Z90.79 STATUS POST BILATERAL SALPINGECTOMY: Status: ACTIVE | Noted: 2024-12-27

## 2024-12-27 PROBLEM — R07.89 CHEST PAIN, ATYPICAL: Status: RESOLVED | Noted: 2022-06-14 | Resolved: 2024-12-27

## 2024-12-27 PROBLEM — Z3A.18 18 WEEKS GESTATION OF PREGNANCY: Status: RESOLVED | Noted: 2024-08-14 | Resolved: 2024-12-27

## 2024-12-27 PROBLEM — O36.8190 DECREASED FETAL MOVEMENT AFFECTING MANAGEMENT OF PREGNANCY: Status: ACTIVE | Noted: 2024-12-27

## 2024-12-27 LAB
ABO GROUP BLD: NORMAL
BLD GP AB SCN SERPL QL: NEGATIVE
DEPRECATED RDW RBC AUTO: 39.1 FL (ref 37–54)
ERYTHROCYTE [DISTWIDTH] IN BLOOD BY AUTOMATED COUNT: 18.4 % (ref 12.3–15.4)
HCT VFR BLD AUTO: 33.2 % (ref 34–46.6)
HGB BLD-MCNC: 9.3 G/DL (ref 12–15.9)
MCH RBC QN AUTO: 18 PG (ref 26.6–33)
MCHC RBC AUTO-ENTMCNC: 28 G/DL (ref 31.5–35.7)
MCV RBC AUTO: 64.1 FL (ref 79–97)
PLATELET # BLD AUTO: 313 10*3/MM3 (ref 140–450)
PMV BLD AUTO: 10 FL (ref 6–12)
RBC # BLD AUTO: 5.18 10*6/MM3 (ref 3.77–5.28)
RH BLD: POSITIVE
T&S EXPIRATION DATE: NORMAL
TREPONEMA PALLIDUM IGG+IGM AB [PRESENCE] IN SERUM OR PLASMA BY IMMUNOASSAY: NORMAL
WBC NRBC COR # BLD AUTO: 9.65 10*3/MM3 (ref 3.4–10.8)

## 2024-12-27 PROCEDURE — 88302 TISSUE EXAM BY PATHOLOGIST: CPT | Performed by: STUDENT IN AN ORGANIZED HEALTH CARE EDUCATION/TRAINING PROGRAM

## 2024-12-27 PROCEDURE — 25010000002 CEFAZOLIN PER 500 MG: Performed by: STUDENT IN AN ORGANIZED HEALTH CARE EDUCATION/TRAINING PROGRAM

## 2024-12-27 PROCEDURE — 86780 TREPONEMA PALLIDUM: CPT | Performed by: STUDENT IN AN ORGANIZED HEALTH CARE EDUCATION/TRAINING PROGRAM

## 2024-12-27 PROCEDURE — 82728 ASSAY OF FERRITIN: CPT | Performed by: STUDENT IN AN ORGANIZED HEALTH CARE EDUCATION/TRAINING PROGRAM

## 2024-12-27 PROCEDURE — G0463 HOSPITAL OUTPT CLINIC VISIT: HCPCS

## 2024-12-27 PROCEDURE — 86850 RBC ANTIBODY SCREEN: CPT | Performed by: STUDENT IN AN ORGANIZED HEALTH CARE EDUCATION/TRAINING PROGRAM

## 2024-12-27 PROCEDURE — 25010000002 PHENYLEPHRINE 10 MG/ML SOLUTION 5 ML VIAL: Performed by: NURSE ANESTHETIST, CERTIFIED REGISTERED

## 2024-12-27 PROCEDURE — 25010000002 OXYTOCIN PER 10 UNITS: Performed by: NURSE ANESTHETIST, CERTIFIED REGISTERED

## 2024-12-27 PROCEDURE — 86900 BLOOD TYPING SEROLOGIC ABO: CPT | Performed by: STUDENT IN AN ORGANIZED HEALTH CARE EDUCATION/TRAINING PROGRAM

## 2024-12-27 PROCEDURE — 88307 TISSUE EXAM BY PATHOLOGIST: CPT | Performed by: STUDENT IN AN ORGANIZED HEALTH CARE EDUCATION/TRAINING PROGRAM

## 2024-12-27 PROCEDURE — 0UT70ZZ RESECTION OF BILATERAL FALLOPIAN TUBES, OPEN APPROACH: ICD-10-PCS | Performed by: STUDENT IN AN ORGANIZED HEALTH CARE EDUCATION/TRAINING PROGRAM

## 2024-12-27 PROCEDURE — 25010000002 METOCLOPRAMIDE PER 10 MG: Performed by: STUDENT IN AN ORGANIZED HEALTH CARE EDUCATION/TRAINING PROGRAM

## 2024-12-27 PROCEDURE — 25010000002 ALBUMIN HUMAN 5% PER 50 ML: Performed by: NURSE ANESTHETIST, CERTIFIED REGISTERED

## 2024-12-27 PROCEDURE — 25010000002 GLYCOPYRROLATE 0.2 MG/ML SOLUTION: Performed by: NURSE ANESTHETIST, CERTIFIED REGISTERED

## 2024-12-27 PROCEDURE — 83540 ASSAY OF IRON: CPT | Performed by: STUDENT IN AN ORGANIZED HEALTH CARE EDUCATION/TRAINING PROGRAM

## 2024-12-27 PROCEDURE — 25010000002 BUPIVACAINE PF 0.75 % SOLUTION: Performed by: NURSE ANESTHETIST, CERTIFIED REGISTERED

## 2024-12-27 PROCEDURE — 25010000002 KETOROLAC TROMETHAMINE PER 15 MG: Performed by: NURSE ANESTHETIST, CERTIFIED REGISTERED

## 2024-12-27 PROCEDURE — 25010000002 FENTANYL CITRATE (PF) 100 MCG/2ML SOLUTION: Performed by: NURSE ANESTHETIST, CERTIFIED REGISTERED

## 2024-12-27 PROCEDURE — 25010000002 DEXAMETHASONE PER 1 MG: Performed by: NURSE ANESTHETIST, CERTIFIED REGISTERED

## 2024-12-27 PROCEDURE — 25010000002 ONDANSETRON PER 1 MG: Performed by: NURSE ANESTHETIST, CERTIFIED REGISTERED

## 2024-12-27 PROCEDURE — 25810000003 SODIUM CHLORIDE 0.9 % SOLUTION 250 ML FLEX CONT: Performed by: NURSE ANESTHETIST, CERTIFIED REGISTERED

## 2024-12-27 PROCEDURE — 85027 COMPLETE CBC AUTOMATED: CPT | Performed by: STUDENT IN AN ORGANIZED HEALTH CARE EDUCATION/TRAINING PROGRAM

## 2024-12-27 PROCEDURE — 84466 ASSAY OF TRANSFERRIN: CPT | Performed by: STUDENT IN AN ORGANIZED HEALTH CARE EDUCATION/TRAINING PROGRAM

## 2024-12-27 PROCEDURE — 25010000002 MORPHINE PER 10 MG: Performed by: NURSE ANESTHETIST, CERTIFIED REGISTERED

## 2024-12-27 PROCEDURE — 25810000003 LACTATED RINGERS PER 1000 ML: Performed by: NURSE ANESTHETIST, CERTIFIED REGISTERED

## 2024-12-27 PROCEDURE — P9041 ALBUMIN (HUMAN),5%, 50ML: HCPCS | Performed by: NURSE ANESTHETIST, CERTIFIED REGISTERED

## 2024-12-27 PROCEDURE — 86901 BLOOD TYPING SEROLOGIC RH(D): CPT | Performed by: STUDENT IN AN ORGANIZED HEALTH CARE EDUCATION/TRAINING PROGRAM

## 2024-12-27 RX ORDER — BUPIVACAINE HYDROCHLORIDE 7.5 MG/ML
INJECTION, SOLUTION EPIDURAL; RETROBULBAR AS NEEDED
Status: DISCONTINUED | OUTPATIENT
Start: 2024-12-27 | End: 2024-12-27 | Stop reason: SURG

## 2024-12-27 RX ORDER — ACETAMINOPHEN 325 MG/1
650 TABLET ORAL EVERY 6 HOURS
Status: DISCONTINUED | OUTPATIENT
Start: 2024-12-28 | End: 2024-12-30 | Stop reason: HOSPADM

## 2024-12-27 RX ORDER — CITRIC ACID/SODIUM CITRATE 334-500MG
30 SOLUTION, ORAL ORAL ONCE
Status: DISCONTINUED | OUTPATIENT
Start: 2024-12-27 | End: 2024-12-27 | Stop reason: HOSPADM

## 2024-12-27 RX ORDER — OXYCODONE HYDROCHLORIDE 5 MG/1
10 TABLET ORAL EVERY 4 HOURS PRN
Status: DISCONTINUED | OUTPATIENT
Start: 2024-12-27 | End: 2024-12-30 | Stop reason: HOSPADM

## 2024-12-27 RX ORDER — PHENYLEPHRINE HCL IN 0.9% NACL 1 MG/10 ML
SYRINGE (ML) INTRAVENOUS AS NEEDED
Status: DISCONTINUED | OUTPATIENT
Start: 2024-12-27 | End: 2024-12-27 | Stop reason: SURG

## 2024-12-27 RX ORDER — SODIUM CHLORIDE 0.9 % (FLUSH) 0.9 %
10 SYRINGE (ML) INJECTION EVERY 12 HOURS SCHEDULED
Status: DISCONTINUED | OUTPATIENT
Start: 2024-12-27 | End: 2024-12-27 | Stop reason: HOSPADM

## 2024-12-27 RX ORDER — KETOROLAC TROMETHAMINE 30 MG/ML
30 INJECTION, SOLUTION INTRAMUSCULAR; INTRAVENOUS EVERY 6 HOURS
Status: DISCONTINUED | OUTPATIENT
Start: 2024-12-28 | End: 2024-12-27 | Stop reason: SDUPTHER

## 2024-12-27 RX ORDER — DOCUSATE SODIUM 100 MG/1
100 CAPSULE, LIQUID FILLED ORAL 2 TIMES DAILY PRN
Status: DISCONTINUED | OUTPATIENT
Start: 2024-12-27 | End: 2024-12-30 | Stop reason: HOSPADM

## 2024-12-27 RX ORDER — FAMOTIDINE 10 MG/ML
20 INJECTION, SOLUTION INTRAVENOUS ONCE AS NEEDED
Status: COMPLETED | OUTPATIENT
Start: 2024-12-27 | End: 2024-12-27

## 2024-12-27 RX ORDER — IBUPROFEN 600 MG/1
600 TABLET, FILM COATED ORAL EVERY 6 HOURS
Status: DISCONTINUED | OUTPATIENT
Start: 2024-12-29 | End: 2024-12-30 | Stop reason: HOSPADM

## 2024-12-27 RX ORDER — HYDROMORPHONE HYDROCHLORIDE 1 MG/ML
0.5 INJECTION, SOLUTION INTRAMUSCULAR; INTRAVENOUS; SUBCUTANEOUS
Status: ACTIVE | OUTPATIENT
Start: 2024-12-27 | End: 2024-12-28

## 2024-12-27 RX ORDER — CARBOPROST TROMETHAMINE 250 UG/ML
250 INJECTION, SOLUTION INTRAMUSCULAR
Status: DISCONTINUED | OUTPATIENT
Start: 2024-12-27 | End: 2024-12-27 | Stop reason: HOSPADM

## 2024-12-27 RX ORDER — SODIUM CHLORIDE 0.9 % (FLUSH) 0.9 %
10 SYRINGE (ML) INJECTION AS NEEDED
Status: DISCONTINUED | OUTPATIENT
Start: 2024-12-27 | End: 2024-12-27 | Stop reason: HOSPADM

## 2024-12-27 RX ORDER — ONDANSETRON 2 MG/ML
INJECTION INTRAMUSCULAR; INTRAVENOUS AS NEEDED
Status: DISCONTINUED | OUTPATIENT
Start: 2024-12-27 | End: 2024-12-27 | Stop reason: SURG

## 2024-12-27 RX ORDER — METOCLOPRAMIDE HYDROCHLORIDE 5 MG/ML
10 INJECTION INTRAMUSCULAR; INTRAVENOUS ONCE AS NEEDED
Status: COMPLETED | OUTPATIENT
Start: 2024-12-27 | End: 2024-12-27

## 2024-12-27 RX ORDER — DIPHENHYDRAMINE HYDROCHLORIDE 50 MG/ML
25 INJECTION INTRAMUSCULAR; INTRAVENOUS EVERY 4 HOURS PRN
Status: DISCONTINUED | OUTPATIENT
Start: 2024-12-27 | End: 2024-12-30 | Stop reason: HOSPADM

## 2024-12-27 RX ORDER — OXYTOCIN/0.9 % SODIUM CHLORIDE 30/500 ML
999 PLASTIC BAG, INJECTION (ML) INTRAVENOUS ONCE
Status: DISCONTINUED | OUTPATIENT
Start: 2024-12-27 | End: 2024-12-27 | Stop reason: HOSPADM

## 2024-12-27 RX ORDER — ALBUMIN HUMAN 50 G/1000ML
SOLUTION INTRAVENOUS CONTINUOUS PRN
Status: DISCONTINUED | OUTPATIENT
Start: 2024-12-27 | End: 2024-12-27 | Stop reason: SURG

## 2024-12-27 RX ORDER — OXYTOCIN/0.9 % SODIUM CHLORIDE 30/500 ML
250 PLASTIC BAG, INJECTION (ML) INTRAVENOUS CONTINUOUS
Status: ACTIVE | OUTPATIENT
Start: 2024-12-27 | End: 2024-12-27

## 2024-12-27 RX ORDER — OXYCODONE HYDROCHLORIDE 5 MG/1
5 TABLET ORAL EVERY 4 HOURS PRN
Status: DISCONTINUED | OUTPATIENT
Start: 2024-12-27 | End: 2024-12-30 | Stop reason: HOSPADM

## 2024-12-27 RX ORDER — ACETAMINOPHEN 500 MG
1000 TABLET ORAL EVERY 6 HOURS
Status: DISCONTINUED | OUTPATIENT
Start: 2024-12-27 | End: 2024-12-27 | Stop reason: SDUPTHER

## 2024-12-27 RX ORDER — DIPHENHYDRAMINE HCL 25 MG
25 CAPSULE ORAL EVERY 4 HOURS PRN
Status: DISCONTINUED | OUTPATIENT
Start: 2024-12-27 | End: 2024-12-30 | Stop reason: HOSPADM

## 2024-12-27 RX ORDER — DEXAMETHASONE SODIUM PHOSPHATE 4 MG/ML
INJECTION, SOLUTION INTRA-ARTICULAR; INTRALESIONAL; INTRAMUSCULAR; INTRAVENOUS; SOFT TISSUE AS NEEDED
Status: DISCONTINUED | OUTPATIENT
Start: 2024-12-27 | End: 2024-12-27 | Stop reason: SURG

## 2024-12-27 RX ORDER — OXYTOCIN/0.9 % SODIUM CHLORIDE 30/500 ML
125 PLASTIC BAG, INJECTION (ML) INTRAVENOUS ONCE AS NEEDED
Status: DISCONTINUED | OUTPATIENT
Start: 2024-12-27 | End: 2024-12-30 | Stop reason: HOSPADM

## 2024-12-27 RX ORDER — SODIUM CHLORIDE, SODIUM LACTATE, POTASSIUM CHLORIDE, CALCIUM CHLORIDE 600; 310; 30; 20 MG/100ML; MG/100ML; MG/100ML; MG/100ML
INJECTION, SOLUTION INTRAVENOUS CONTINUOUS PRN
Status: DISCONTINUED | OUTPATIENT
Start: 2024-12-27 | End: 2024-12-27 | Stop reason: SURG

## 2024-12-27 RX ORDER — METHYLERGONOVINE MALEATE 0.2 MG/ML
200 INJECTION INTRAVENOUS ONCE AS NEEDED
Status: DISCONTINUED | OUTPATIENT
Start: 2024-12-27 | End: 2024-12-27 | Stop reason: HOSPADM

## 2024-12-27 RX ORDER — KETOROLAC TROMETHAMINE 30 MG/ML
30 INJECTION, SOLUTION INTRAMUSCULAR; INTRAVENOUS ONCE
Status: DISCONTINUED | OUTPATIENT
Start: 2024-12-28 | End: 2024-12-27 | Stop reason: HOSPADM

## 2024-12-27 RX ORDER — ONDANSETRON 2 MG/ML
4 INJECTION INTRAMUSCULAR; INTRAVENOUS ONCE AS NEEDED
Status: ACTIVE | OUTPATIENT
Start: 2024-12-27 | End: 2024-12-28

## 2024-12-27 RX ORDER — SODIUM CHLORIDE 9 MG/ML
40 INJECTION, SOLUTION INTRAVENOUS AS NEEDED
Status: DISCONTINUED | OUTPATIENT
Start: 2024-12-27 | End: 2024-12-27 | Stop reason: HOSPADM

## 2024-12-27 RX ORDER — ACETAMINOPHEN 500 MG
1000 TABLET ORAL EVERY 6 HOURS
Status: COMPLETED | OUTPATIENT
Start: 2024-12-27 | End: 2024-12-28

## 2024-12-27 RX ORDER — KETOROLAC TROMETHAMINE 30 MG/ML
15 INJECTION, SOLUTION INTRAMUSCULAR; INTRAVENOUS EVERY 6 HOURS
Status: COMPLETED | OUTPATIENT
Start: 2024-12-28 | End: 2024-12-28

## 2024-12-27 RX ORDER — EPHEDRINE SULFATE 5 MG/ML
INJECTION INTRAVENOUS AS NEEDED
Status: DISCONTINUED | OUTPATIENT
Start: 2024-12-27 | End: 2024-12-27 | Stop reason: SURG

## 2024-12-27 RX ORDER — OXYCODONE HYDROCHLORIDE 5 MG/1
10 TABLET ORAL EVERY 4 HOURS PRN
Status: DISCONTINUED | OUTPATIENT
Start: 2024-12-27 | End: 2024-12-27 | Stop reason: SDUPTHER

## 2024-12-27 RX ORDER — FERROUS SULFATE 325(65) MG
325 TABLET ORAL
Status: DISCONTINUED | OUTPATIENT
Start: 2024-12-28 | End: 2024-12-30 | Stop reason: HOSPADM

## 2024-12-27 RX ORDER — GLYCOPYRROLATE 0.2 MG/ML
INJECTION INTRAMUSCULAR; INTRAVENOUS AS NEEDED
Status: DISCONTINUED | OUTPATIENT
Start: 2024-12-27 | End: 2024-12-27 | Stop reason: SURG

## 2024-12-27 RX ORDER — OXYTOCIN/0.9 % SODIUM CHLORIDE 30/500 ML
PLASTIC BAG, INJECTION (ML) INTRAVENOUS CONTINUOUS PRN
Status: DISCONTINUED | OUTPATIENT
Start: 2024-12-27 | End: 2024-12-27 | Stop reason: SURG

## 2024-12-27 RX ORDER — OXYCODONE HYDROCHLORIDE 5 MG/1
5 TABLET ORAL EVERY 4 HOURS PRN
Status: DISCONTINUED | OUTPATIENT
Start: 2024-12-27 | End: 2024-12-27 | Stop reason: SDUPTHER

## 2024-12-27 RX ORDER — ONDANSETRON 4 MG/1
4 TABLET, ORALLY DISINTEGRATING ORAL EVERY 6 HOURS PRN
Status: DISCONTINUED | OUTPATIENT
Start: 2024-12-27 | End: 2024-12-30 | Stop reason: HOSPADM

## 2024-12-27 RX ORDER — MORPHINE SULFATE 1 MG/ML
INJECTION, SOLUTION EPIDURAL; INTRATHECAL; INTRAVENOUS AS NEEDED
Status: DISCONTINUED | OUTPATIENT
Start: 2024-12-27 | End: 2024-12-27 | Stop reason: SURG

## 2024-12-27 RX ORDER — SIMETHICONE 80 MG
80 TABLET,CHEWABLE ORAL 4 TIMES DAILY PRN
Status: DISCONTINUED | OUTPATIENT
Start: 2024-12-27 | End: 2024-12-30 | Stop reason: HOSPADM

## 2024-12-27 RX ORDER — MISOPROSTOL 200 UG/1
800 TABLET ORAL ONCE AS NEEDED
Status: DISCONTINUED | OUTPATIENT
Start: 2024-12-27 | End: 2024-12-27 | Stop reason: HOSPADM

## 2024-12-27 RX ORDER — ONDANSETRON 2 MG/ML
4 INJECTION INTRAMUSCULAR; INTRAVENOUS EVERY 6 HOURS PRN
Status: DISCONTINUED | OUTPATIENT
Start: 2024-12-27 | End: 2024-12-30 | Stop reason: HOSPADM

## 2024-12-27 RX ORDER — OXYTOCIN 10 [USP'U]/ML
INJECTION, SOLUTION INTRAMUSCULAR; INTRAVENOUS AS NEEDED
Status: DISCONTINUED | OUTPATIENT
Start: 2024-12-27 | End: 2024-12-27 | Stop reason: SURG

## 2024-12-27 RX ORDER — KETOROLAC TROMETHAMINE 30 MG/ML
INJECTION, SOLUTION INTRAMUSCULAR; INTRAVENOUS AS NEEDED
Status: DISCONTINUED | OUTPATIENT
Start: 2024-12-27 | End: 2024-12-27 | Stop reason: SURG

## 2024-12-27 RX ORDER — FENTANYL CITRATE 50 UG/ML
INJECTION, SOLUTION INTRAMUSCULAR; INTRAVENOUS AS NEEDED
Status: DISCONTINUED | OUTPATIENT
Start: 2024-12-27 | End: 2024-12-27 | Stop reason: SURG

## 2024-12-27 RX ORDER — ACETAMINOPHEN 500 MG
1000 TABLET ORAL ONCE
Status: DISCONTINUED | OUTPATIENT
Start: 2024-12-27 | End: 2024-12-27 | Stop reason: HOSPADM

## 2024-12-27 RX ADMIN — BUPIVACAINE HYDROCHLORIDE 1.6 ML: 7.5 INJECTION, SOLUTION EPIDURAL; RETROBULBAR at 17:14

## 2024-12-27 RX ADMIN — PHENYLEPHRINE HYDROCHLORIDE 0.5 MCG/KG/MIN: 10 INJECTION INTRAVENOUS at 17:14

## 2024-12-27 RX ADMIN — Medication 100 MCG: at 17:17

## 2024-12-27 RX ADMIN — SODIUM CHLORIDE 100 ML: 900 INJECTION INTRAVENOUS at 16:41

## 2024-12-27 RX ADMIN — ONDANSETRON 8 MG: 2 INJECTION INTRAMUSCULAR; INTRAVENOUS at 16:59

## 2024-12-27 RX ADMIN — FAMOTIDINE 20 MG: 10 INJECTION INTRAVENOUS at 16:43

## 2024-12-27 RX ADMIN — SODIUM CHLORIDE, SODIUM LACTATE, POTASSIUM CHLORIDE, AND CALCIUM CHLORIDE: .6; .31; .03; .02 INJECTION, SOLUTION INTRAVENOUS at 17:20

## 2024-12-27 RX ADMIN — GLYCOPYRROLATE 0.2 MG: 0.2 INJECTION INTRAMUSCULAR; INTRAVENOUS at 17:20

## 2024-12-27 RX ADMIN — EPHEDRINE SULFATE 10 MG: 5 INJECTION INTRAVENOUS at 17:19

## 2024-12-27 RX ADMIN — SODIUM CHLORIDE, SODIUM LACTATE, POTASSIUM CHLORIDE, AND CALCIUM CHLORIDE: .6; .31; .03; .02 INJECTION, SOLUTION INTRAVENOUS at 17:03

## 2024-12-27 RX ADMIN — OXYTOCIN 3 UNITS: 10 INJECTION INTRAVENOUS at 17:33

## 2024-12-27 RX ADMIN — CEFAZOLIN 2000 MG: 2 INJECTION, POWDER, FOR SOLUTION INTRAMUSCULAR; INTRAVENOUS at 16:42

## 2024-12-27 RX ADMIN — Medication 125 ML/HR: at 19:17

## 2024-12-27 RX ADMIN — OXYTOCIN 3 UNITS: 10 INJECTION INTRAVENOUS at 17:35

## 2024-12-27 RX ADMIN — MORPHINE SULFATE 100 MCG: 1 INJECTION, SOLUTION EPIDURAL; INTRATHECAL; INTRAVENOUS at 17:14

## 2024-12-27 RX ADMIN — KETOROLAC TROMETHAMINE 30 MG: 30 INJECTION, SOLUTION INTRAMUSCULAR at 18:01

## 2024-12-27 RX ADMIN — ALBUMIN HUMAN: 0.05 INJECTION, SOLUTION INTRAVENOUS at 18:19

## 2024-12-27 RX ADMIN — ACETAMINOPHEN 1000 MG: 500 TABLET, FILM COATED ORAL at 22:47

## 2024-12-27 RX ADMIN — METOCLOPRAMIDE 10 MG: 5 INJECTION, SOLUTION INTRAMUSCULAR; INTRAVENOUS at 16:43

## 2024-12-27 RX ADMIN — Medication 100 MCG: at 17:51

## 2024-12-27 RX ADMIN — Medication 125 ML/HR: at 17:33

## 2024-12-27 RX ADMIN — FENTANYL CITRATE 10 MCG: 50 INJECTION, SOLUTION INTRAMUSCULAR; INTRAVENOUS at 17:14

## 2024-12-27 RX ADMIN — DEXAMETHASONE SODIUM PHOSPHATE 4 MG: 4 INJECTION, SOLUTION INTRAMUSCULAR; INTRAVENOUS at 17:33

## 2024-12-27 NOTE — L&D DELIVERY NOTE
[facility]   Section Operative Note    Preoperative Dx:   1.  Patient is a 29 y.o. female  currently at 37w2d, who presents with persistent decreased fetal movement for PLTCD.    2. PLTCD secondary to prior child/ infant with birth trauma, prior clavicle fracture in last   3. Anxiety  4. Anemia  5. Desires permanent sterilization       Postoperative dx:    1.  Same as above  2. S/P bilateral salpingectomy   3. Fetal nuchal cord     Procedure: Primary  and and bilateral salpingectomy    Surgeon/Assistant: Fany Parra MD   Anesthesia:  Anesthesiologist:  Spinal  Anesthesiologist: Gordy Jimenez MD  CRNA: Andie English CRNA     EBL:  800m, QBL pending     Antibiotics: cefazolin (Ancef)     Infant    Findings: VFI     Apgars: 8 and 8 at 1 and 5 minutes.        Procedure Details:     Patient was taken to the OR where she was prepped and draped in the usual sterile fashion with a catheter and a left tilt.  Spinal anesthesia was found to be adequate.    A Pfannenstiel skin incision was made with the scalpel and carried through to the underlying layer of fascia with the scalpel.  The fascial incision was extended laterally with the Arriola scissors and  from the underlying rectus muscles superiorly and inferiorly.  The rectus muscles were  in the midline and the peritoneum was entered bluntly.  The peritoneal incision was extended laterally and the Torres retractor was placed.  The bladder flap was created sharply.   A low transverse uterine incision was made with the scalpel and extended cephalocaudally. The infant's head, shoulders, and body were delivered but there was a tight nuchal cord x 1 around infants neck that had to be reduced. Nose and mouth were bulb suctioned and infant handed to awaiting nurse with good cry, color, tone, and movement of all extremities.    Placenta was delivered spontaneously intact with a three-vessel cord. Placenta was sent to pathology  due to persistent decreased fetal movement.   The uterus was exteriorized and cleared of all clots and debris.    The uterine incision was repaired with 0 Monocryl in a running, locked fashion. The right lateral edge of hysterotomy had oozing, a figure of eight suture using chromic suture was used to ensure hemostasis.   Attention was then turned to the sterilization procedure, patient confirmed again that she wanted proceed with bilateral tubal ligation. Both left and right ovaries were visualized and appear normal. The right fallopian tube was grasped with babcocks and followed down to the fimbria, the mesosalpinx was transected using electrocautery and suture ligated up to approximately 2 cm from the cornua using the Ligasure device. Attention was turned to the left side and procedure was repeated, performing complete bilateral salpingectomy.   The uterus was inspected prior  and confirmed hemostasis, then returned to the abdomen, the gutters were cleared of all clots and debris, including posterior gutter.   The peritoneum was reapproximated with 3-0 Monocryl in a running fashion.  The fascia was closed with 0 Vicryl in a running fashion.    The subcutaneous fat was irrigated, confirmed hemostasis with electrocautery.  The skin was closed with 4-0 vicryl in a subcuticular fashion.   Sponge, lap, and needle counts were correct x 2.        Complications:   None      Disposition:   Mother to Mother Baby/Postpartum  in stable condition currently.   Baby to remains with mom  in stable condition currently.    Findings: normal appearing bilateral fallopian tubes and ovaries  Pathology: cord blood, bilateral fallopian tubes, and placenta sent                              12/27/2024  18:21 JAMIE Parra MD/MPH

## 2024-12-27 NOTE — ANESTHESIA POSTPROCEDURE EVALUATION
Patient: Myrtle Clifford    Procedure Summary       Date: 24 Room / Location: [facility] LABOR DELIVERY 2 / [facility] LABOR DELIVERY    Anesthesia Start: 1703 Anesthesia Stop:     Procedure:  SECTION REPEAT WITH TUBAL (Abdomen) Diagnosis:       History of shoulder dystocia in prior pregnancy, currently pregnant      (History of shoulder dystocia in prior pregnancy, currently pregnant [O09.299])    Surgeons: Fany Parra MD Provider: Gordy Jimenez MD    Anesthesia Type: spinal ASA Status: 2            Anesthesia Type: spinal    Vitals  No vitals data found for the desired time range.          Post Anesthesia Care and Evaluation    Patient location during evaluation: PACU  Patient participation: complete - patient participated  Level of consciousness: awake  Pain scale: See nurse's notes for pain score.  Pain management: adequate    Airway patency: patent  Anesthetic complications: No anesthetic complications  PONV Status: none  Cardiovascular status: acceptable  Respiratory status: acceptable and spontaneous ventilation  Hydration status: acceptable  Post Neuraxial Block status: Motor and sensory function returned to baseline and No signs or symptoms of PDPH  Comments: Patient seen and examined postoperatively; vital signs stable; SpO2 greater than or equal to 90%; cardiopulmonary status stable; nausea/vomiting adequately controlled; pain adequately controlled; no apparent anesthesia complications; patient discharged from anesthesia care when discharge criteria were met

## 2024-12-27 NOTE — H&P
[facility]  Obstetric History and Physical     Chief Complaint: patient presents from the the office for PLTCD secondary to persistent decreased FM     Subjective     Patient is a 29 y.o. female  currently at 37+2 , who presents with persistent decreased FM from the office. Since the end of November patient has reported persistent decreased FM that has gotten worst. She reports she goes days without feeling movement. She said her symptoms have gotten worst over the past week where she has not been able to eat or drink or keep any food down. She has had multiple ED visits including [facility] and also to [facility]. She has had many weekly  testing that has been reassuring. Had  testing today with  BPP, NST non reactive in office with variable deceleration and minimal variability. Patient has high anxiety about decreased fetal movement and it has worsened over the past week.    Her prenatal care is multiparous, anxiety/depression, persistent decreased FM, tachycardia, anemia, and prior infant with clavicle fracture and desires PLTCD, and patient desires permanent sterilization.       Prenatal Information:  See office H&P for full details and labs.      Past OB History:       OB History    Para Term  AB Living   8 4 4 0 2 4   SAB IAB Ectopic Molar Multiple Live Births   1 0 1 0 0 4               Past Medical History: Past Medical History:   Diagnosis Date    Anxiety associated with depression     Ectopic pregnancy     Hidradenitis         Past Surgical History Past Surgical History:   Procedure Laterality Date    CHOLECYSTECTOMY  2016    CYST REMOVAL      ECTOPIC PREGNANCY  2019    MASS EXCISION Right 2023    Procedure: SKIN LESION EXCISION, right axilla, patent supine with arm up;  Surgeon: Dion Carmona MD;  Location: [facility] OR;  Service: General;  Laterality: Right;    TEAR DUCT SURGERY Bilateral     as a baby    TONSILLECTOMY      young child        Family  "History: Family History   Problem Relation Age of Onset    Diabetes Mother     Hypertension Father     Diabetes Father     Hyperlipidemia Father     Diabetes Maternal Grandmother     Cancer Maternal Grandmother     Diabetes Maternal Grandfather     Cancer Maternal Grandfather       Social History:  reports that she quit smoking about 8 years ago. Her smoking use included cigarettes. She started smoking about 10 years ago. She has a 1 pack-year smoking history. She has never been exposed to tobacco smoke. She has never used smokeless tobacco.   reports that she does not currently use alcohol.   reports no history of drug use.        General ROS: Pertinent items are noted in HPI    Objective      Vitals:     Vitals:    12/27/24 1551   Weight: 74 kg (163 lb 2.3 oz)   Height: 157.5 cm (62\")       Fetal Heart Rate Assessment:   Cat II with intermittent variable decelerations    [place]:   irregular     Physical Exam:     General Appearance:    Alert, cooperative, in no acute distress   Abdomen:     Soft, non-tender, no guarding, no rebound tenderness,       EFW < 7#   Pelvic Exam:    Pelvis adequate.    Presentation: Vertex    Cervix: deferred today   Extremities:   Moves all extremities well, no edema, no cyanosis, no              redness   Skin:   No bleeding, bruising or rash   Neurologic:   No focal neurologic defect          Laboratory Results:   Lab Results (last 48 hours)       ** No results found for the last 48 hours. **               Assessment & Plan     Principal Problem:    Pregnant and not yet delivered         Assessment:  1.  Intrauterine pregnancy at 37+2 wks gestation.    2.  Risk reducing PLTCD secondary to persistent decreased fetal movement, and elective PLTCD due to prior infant with clavicle fracture  3.  GBS status:Negative    Plan:  1. Primary LTCS and bilateral tubal ligation  Last infant > 8# and largest infant with clavicle fracture. Patient reports due to prior trauma with that delivery she " desires PLTCD and bilateral tubal ligation. Patient given strict counseling even with smaller infant subjected to be < 8# she does not want to proceed with vaginal delivery.   Desires permanent sterilization, counseled on r/b/a including other reversible options such as LARC including nexplanon and intrauterine device, also counseled on partner obtaining vasectomy.   Discussed risk of surgery with  bleeding, infection, damage to surrounding structures such as bowel or bladder, risk of regret with higher risk of regret and desire for more children in the future when performed at a younger age.   Also counseled on risk of anesthesia, including VTE and death.  Plan for PLTCD and bilateral salpingectomy   Tubal consent signed 2024.    Patient is having early term delivery secondary to decreased FM.   MFM consultation completed with Dr. Gabbi Juarez due to persistent decreased FM, even with  testing that is reassuring, due to change in symptoms that have worsened and variable decelerations on monitoring, imminent delivery is indicated d/t risk of stillbirth despite normal prior testing.  Patient has received extensive counseling on risks of early term delivery such as prolonged NICU stay, respiratory depression and support for fetus, hypoglycemia, and jaundice. Patient desires to proceed despite counseling above.      2. Plan of care has been reviewed with patient.  3.  Risks, benefits of treatment plan have been discussed.  4.  All questions have been answered.         Fany Parra MD   2024   16:23 EST

## 2024-12-27 NOTE — ANESTHESIA PROCEDURE NOTES
Spinal Block    Pre-sedation assessment completed: 12/27/2024 5:00 PM    Patient reassessed immediately prior to procedure    Patient location during procedure: OB  Start Time: 12/27/2024 5:10 PM  Stop Time: 12/27/2024 5:17 PM  Indication:at surgeon's request  Performed By  Anesthesiologist: Gordy Jimenez MD CRNA/CAA: Andie English CRNA  Preanesthetic Checklist  Completed: patient identified, IV checked, site marked, risks and benefits discussed, surgical consent, monitors and equipment checked, pre-op evaluation and timeout performed  Spinal Block Prep:  Patient Position:sitting  Sterile Tech:cap, gloves, mask and sterile barriers  Prep:Chloraprep  Patient Monitoring:blood pressure monitoring and continuous pulse oximetry    Spinal Block Procedure  Approach:midline  Guidance:landmark technique and palpation technique  Location:L3-L4  Needle Type:Sprotte  Needle Gauge:27 G  Placement of Spinal needle event:cerebrospinal fluid aspirated  Paresthesia: no  Fluid Appearance:clear     Post Assessment  Patient Tolerance:patient tolerated the procedure well with no apparent complications  Complications no

## 2024-12-27 NOTE — ANESTHESIA PREPROCEDURE EVALUATION
Anesthesia Evaluation     NPO Solid Status: > 8 hours  NPO Liquid Status: > 8 hours           Airway   Mallampati: II  TM distance: >3 FB  Neck ROM: full  No difficulty expected  Dental - normal exam     Pulmonary - normal exam   Cardiovascular - normal exam        Neuro/Psych  (+) psychiatric history Anxiety  GI/Hepatic/Renal/Endo      Musculoskeletal     (+) back pain  Abdominal  - normal exam    Bowel sounds: normal.   Substance History      OB/GYN    (+) Pregnant        Other                    Anesthesia Plan    ASA 2     spinal       Anesthetic plan, risks, benefits, and alternatives have been provided, discussed and informed consent has been obtained with: patient.  Pre-procedure education provided  Plan discussed with CRNA.    CODE STATUS:    Level Of Support Discussed With: Patient  Code Status (Patient has no pulse and is not breathing): CPR (Attempt to Resuscitate)  Medical Interventions (Patient has pulse or is breathing): Full Support

## 2024-12-28 PROBLEM — O36.8190 DECREASED FETAL MOVEMENT AFFECTING MANAGEMENT OF PREGNANCY: Status: RESOLVED | Noted: 2024-12-27 | Resolved: 2024-12-28

## 2024-12-28 PROBLEM — D64.9 ACUTE ON CHRONIC ANEMIA: Status: ACTIVE | Noted: 2024-12-28

## 2024-12-28 PROBLEM — O99.019 ANEMIA AFFECTING PREGNANCY: Status: RESOLVED | Noted: 2024-12-27 | Resolved: 2024-12-28

## 2024-12-28 PROBLEM — Z34.90 PREGNANT AND NOT YET DELIVERED: Status: RESOLVED | Noted: 2024-12-27 | Resolved: 2024-12-28

## 2024-12-28 LAB
BASOPHILS # BLD AUTO: 0.01 10*3/MM3 (ref 0–0.2)
BASOPHILS NFR BLD AUTO: 0.1 % (ref 0–1.5)
DEPRECATED RDW RBC AUTO: 39.6 FL (ref 37–54)
EOSINOPHIL # BLD AUTO: 0 10*3/MM3 (ref 0–0.4)
EOSINOPHIL NFR BLD AUTO: 0 % (ref 0.3–6.2)
ERYTHROCYTE [DISTWIDTH] IN BLOOD BY AUTOMATED COUNT: 17.5 % (ref 12.3–15.4)
FERRITIN SERPL-MCNC: 14.2 NG/ML (ref 13–150)
HCT VFR BLD AUTO: 28 % (ref 34–46.6)
HGB BLD-MCNC: 7.7 G/DL (ref 12–15.9)
IMM GRANULOCYTES # BLD AUTO: 0.04 10*3/MM3 (ref 0–0.05)
IMM GRANULOCYTES NFR BLD AUTO: 0.4 % (ref 0–0.5)
IRON 24H UR-MRATE: 27 MCG/DL (ref 37–145)
IRON SATN MFR SERPL: 4 % (ref 20–50)
LYMPHOCYTES # BLD AUTO: 2.81 10*3/MM3 (ref 0.7–3.1)
LYMPHOCYTES NFR BLD AUTO: 24.8 % (ref 19.6–45.3)
MCH RBC QN AUTO: 17.7 PG (ref 26.6–33)
MCHC RBC AUTO-ENTMCNC: 27.5 G/DL (ref 31.5–35.7)
MCV RBC AUTO: 64.4 FL (ref 79–97)
MONOCYTES # BLD AUTO: 0.75 10*3/MM3 (ref 0.1–0.9)
MONOCYTES NFR BLD AUTO: 6.6 % (ref 5–12)
NEUTROPHILS NFR BLD AUTO: 68.1 % (ref 42.7–76)
NEUTROPHILS NFR BLD AUTO: 7.74 10*3/MM3 (ref 1.7–7)
NRBC BLD AUTO-RTO: 0 /100 WBC (ref 0–0.2)
PLATELET # BLD AUTO: 288 10*3/MM3 (ref 140–450)
PMV BLD AUTO: 9.9 FL (ref 6–12)
RBC # BLD AUTO: 4.35 10*6/MM3 (ref 3.77–5.28)
TIBC SERPL-MCNC: 763 MCG/DL (ref 298–536)
TRANSFERRIN SERPL-MCNC: 512 MG/DL (ref 200–360)
WBC NRBC COR # BLD AUTO: 11.35 10*3/MM3 (ref 3.4–10.8)

## 2024-12-28 PROCEDURE — 25010000002 IRON SUCROSE PER 1 MG: Performed by: STUDENT IN AN ORGANIZED HEALTH CARE EDUCATION/TRAINING PROGRAM

## 2024-12-28 PROCEDURE — 85025 COMPLETE CBC W/AUTO DIFF WBC: CPT | Performed by: STUDENT IN AN ORGANIZED HEALTH CARE EDUCATION/TRAINING PROGRAM

## 2024-12-28 PROCEDURE — 25810000003 SODIUM CHLORIDE 0.9 % SOLUTION 250 ML FLEX CONT: Performed by: STUDENT IN AN ORGANIZED HEALTH CARE EDUCATION/TRAINING PROGRAM

## 2024-12-28 PROCEDURE — 25010000002 KETOROLAC TROMETHAMINE PER 15 MG: Performed by: STUDENT IN AN ORGANIZED HEALTH CARE EDUCATION/TRAINING PROGRAM

## 2024-12-28 RX ORDER — CYCLOBENZAPRINE HCL 10 MG
10 TABLET ORAL 3 TIMES DAILY PRN
Status: DISCONTINUED | OUTPATIENT
Start: 2024-12-28 | End: 2024-12-30 | Stop reason: HOSPADM

## 2024-12-28 RX ADMIN — KETOROLAC TROMETHAMINE 15 MG: 30 INJECTION, SOLUTION INTRAMUSCULAR at 01:56

## 2024-12-28 RX ADMIN — ACETAMINOPHEN 650 MG: 325 TABLET, FILM COATED ORAL at 22:56

## 2024-12-28 RX ADMIN — KETOROLAC TROMETHAMINE 15 MG: 30 INJECTION, SOLUTION INTRAMUSCULAR at 14:01

## 2024-12-28 RX ADMIN — SODIUM CHLORIDE 300 MG: 9 INJECTION, SOLUTION INTRAVENOUS at 12:24

## 2024-12-28 RX ADMIN — ACETAMINOPHEN 1000 MG: 500 TABLET, FILM COATED ORAL at 11:35

## 2024-12-28 RX ADMIN — SIMETHICONE 80 MG: 80 TABLET, CHEWABLE ORAL at 11:48

## 2024-12-28 RX ADMIN — FERROUS SULFATE TAB 325 MG (65 MG ELEMENTAL FE) 325 MG: 325 (65 FE) TAB at 07:23

## 2024-12-28 RX ADMIN — ACETAMINOPHEN 1000 MG: 500 TABLET, FILM COATED ORAL at 16:57

## 2024-12-28 RX ADMIN — KETOROLAC TROMETHAMINE 15 MG: 30 INJECTION, SOLUTION INTRAMUSCULAR at 07:22

## 2024-12-28 RX ADMIN — SIMETHICONE 80 MG: 80 TABLET, CHEWABLE ORAL at 20:54

## 2024-12-28 RX ADMIN — KETOROLAC TROMETHAMINE 15 MG: 30 INJECTION, SOLUTION INTRAMUSCULAR at 20:29

## 2024-12-28 RX ADMIN — CYCLOBENZAPRINE 10 MG: 10 TABLET, FILM COATED ORAL at 16:57

## 2024-12-28 RX ADMIN — ACETAMINOPHEN 1000 MG: 500 TABLET, FILM COATED ORAL at 04:43

## 2024-12-28 NOTE — NURSING NOTE
Pt given towels and shower supplies, IV wrapped. Pt thoroughly educated on taking incision bandage off in shower and leaving steri strips on. Pt understanding verified. Pt is waiting until after she eats for a shower.

## 2024-12-28 NOTE — PROGRESS NOTES
CHEYANNE Nelson  Postpartum Note    Subjective   Postpartum Day 1:  Primary Low Transverse  Section and  bilateral salpingectomy    Patient reports some shoulder pain bilaterally that is worsened with movement. Her pain is otherwise well controlled with nonsteroidal anti-inflammatory drugs, Tylenol, and prescribed pain medications. She is ambulating and voiding well.  Bleeding is appropriate for pp period.      Objective     Vitals:  Vitals:    24 2037 24 2150 24 0256 24 0755   BP: 106/72 100/68 117/71 103/67   BP Location: Left arm Left arm Right arm Left arm   Patient Position: Lying Lying Lying Sitting   Pulse: 60 86 83 86   Resp: 16 16 19 16   Temp:  97.9 °F (36.6 °C) 97.8 °F (36.6 °C) 97.9 °F (36.6 °C)   TempSrc:  Oral Oral Oral   SpO2: 100% 99% 99% 97%   Weight:       Height:           Physical Exam:  General:  no acute distress.  Abdomen: Fundus firm below umbilicus, nontender and incision clean, dry, and intact, small area of dried blood on bandage, within marked lines  Extremities: moves all extremities well, no cyanosis or erythema, 1+ edema      Labs:  Results from last 7 days   Lab Units 24  0446 24  1621   WBC 10*3/mm3 11.35* 9.65   HEMOGLOBIN g/dL 7.7* 9.3*   HEMATOCRIT % 28.0* 33.2*   PLATELETS 10*3/mm3 288 313              Feeding method: Breastfeeding Status: Yes     Blood Type: RH Positive        Assessment & Plan     Active Problems:    Status post primary low transverse  section    Status post bilateral salpingectomy    Acute on chronic anemia      Myrtle Clifford is Day 1  post-partum from a  Primary Low Transverse  Section and  bilateral salpingectomy      Plan:  Continue current care. Will give IV iron due to PP Hgb 7.7, down from 9.3. Encouraged simethicone & added Flexeril for shoulder pain. Plan for DC home POD#2-3 pending meeting PP Milestones.      Ninfa Dudley MD  2024  10:44 EST

## 2024-12-28 NOTE — LACTATION NOTE
This note was copied from a baby's chart.  Mother had just finished feeding the baby when I entered the room. Mother is currently breastfeeding and following with formula due to positive ralf. Provided mother with nipple cream and gel pads, instructed on use. Basic teaching reviewed. Mother states that she  her 1st child X3days. 2nd child was in NICU so she provided no breast milk. 3rd child she breast fed for 2 to 3 weeks. 4th child she  for 2mos. Encouraged to call with questions or feeding observation.

## 2024-12-28 NOTE — NURSING NOTE
"While providing care for patient, this RN noted that the FOB became very frustrated while attempting to swaddle infant. FOB became so agitated he stated \"f**k it, I'm leaving\" and stormed out of room. Patient does not speak positively about infant. Patient does not make eye contact with healthcare staff. Patient stated to baby repeatedly \"why are you being such a piggy?\". This RN has also noted hostile behavior toward other children in patient room by FOB. FOB yells at children for throwing a fit when children are silently crying.  "

## 2024-12-28 NOTE — PLAN OF CARE
Goal Outcome Evaluation:   Pt is A&Ox4, on room air, up ad pedro. Voids appropriately.

## 2024-12-29 LAB
ANISOCYTOSIS BLD QL: NORMAL
BASOPHILS # BLD AUTO: 0.01 10*3/MM3 (ref 0–0.2)
BASOPHILS # BLD AUTO: 0.02 10*3/MM3 (ref 0–0.2)
BASOPHILS NFR BLD AUTO: 0.1 % (ref 0–1.5)
BASOPHILS NFR BLD AUTO: 0.2 % (ref 0–1.5)
DEPRECATED RDW RBC AUTO: 38.8 FL (ref 37–54)
DEPRECATED RDW RBC AUTO: 39.2 FL (ref 37–54)
ELLIPTOCYTES BLD QL SMEAR: NORMAL
EOSINOPHIL # BLD AUTO: 0.05 10*3/MM3 (ref 0–0.4)
EOSINOPHIL # BLD AUTO: 0.06 10*3/MM3 (ref 0–0.4)
EOSINOPHIL NFR BLD AUTO: 0.5 % (ref 0.3–6.2)
EOSINOPHIL NFR BLD AUTO: 0.7 % (ref 0.3–6.2)
ERYTHROCYTE [DISTWIDTH] IN BLOOD BY AUTOMATED COUNT: 17.6 % (ref 12.3–15.4)
ERYTHROCYTE [DISTWIDTH] IN BLOOD BY AUTOMATED COUNT: 17.8 % (ref 12.3–15.4)
HCT VFR BLD AUTO: 22.2 % (ref 34–46.6)
HCT VFR BLD AUTO: 25.8 % (ref 34–46.6)
HGB BLD-MCNC: 6.1 G/DL (ref 12–15.9)
HGB BLD-MCNC: 7.2 G/DL (ref 12–15.9)
IMM GRANULOCYTES # BLD AUTO: 0.04 10*3/MM3 (ref 0–0.05)
IMM GRANULOCYTES # BLD AUTO: 0.07 10*3/MM3 (ref 0–0.05)
IMM GRANULOCYTES NFR BLD AUTO: 0.5 % (ref 0–0.5)
IMM GRANULOCYTES NFR BLD AUTO: 0.7 % (ref 0–0.5)
LARGE PLATELETS: NORMAL
LYMPHOCYTES # BLD AUTO: 1.98 10*3/MM3 (ref 0.7–3.1)
LYMPHOCYTES # BLD AUTO: 2.34 10*3/MM3 (ref 0.7–3.1)
LYMPHOCYTES NFR BLD AUTO: 22.5 % (ref 19.6–45.3)
LYMPHOCYTES NFR BLD AUTO: 24.1 % (ref 19.6–45.3)
MCH RBC QN AUTO: 17.4 PG (ref 26.6–33)
MCH RBC QN AUTO: 18 PG (ref 26.6–33)
MCHC RBC AUTO-ENTMCNC: 27.5 G/DL (ref 31.5–35.7)
MCHC RBC AUTO-ENTMCNC: 27.9 G/DL (ref 31.5–35.7)
MCV RBC AUTO: 63.4 FL (ref 79–97)
MCV RBC AUTO: 64.3 FL (ref 79–97)
MICROCYTES BLD QL: NORMAL
MONOCYTES # BLD AUTO: 0.33 10*3/MM3 (ref 0.1–0.9)
MONOCYTES # BLD AUTO: 0.47 10*3/MM3 (ref 0.1–0.9)
MONOCYTES NFR BLD AUTO: 4 % (ref 5–12)
MONOCYTES NFR BLD AUTO: 4.5 % (ref 5–12)
NEUTROPHILS NFR BLD AUTO: 5.8 10*3/MM3 (ref 1.7–7)
NEUTROPHILS NFR BLD AUTO: 7.45 10*3/MM3 (ref 1.7–7)
NEUTROPHILS NFR BLD AUTO: 70.6 % (ref 42.7–76)
NEUTROPHILS NFR BLD AUTO: 71.6 % (ref 42.7–76)
NRBC BLD AUTO-RTO: 0.4 /100 WBC (ref 0–0.2)
NRBC BLD AUTO-RTO: 0.7 /100 WBC (ref 0–0.2)
PLATELET # BLD AUTO: 265 10*3/MM3 (ref 140–450)
PLATELET # BLD AUTO: 294 10*3/MM3 (ref 140–450)
PMV BLD AUTO: 9.6 FL (ref 6–12)
PMV BLD AUTO: 9.7 FL (ref 6–12)
RBC # BLD AUTO: 3.5 10*6/MM3 (ref 3.77–5.28)
RBC # BLD AUTO: 4.01 10*6/MM3 (ref 3.77–5.28)
SMALL PLATELETS BLD QL SMEAR: ADEQUATE
WBC MORPH BLD: NORMAL
WBC NRBC COR # BLD AUTO: 10.4 10*3/MM3 (ref 3.4–10.8)
WBC NRBC COR # BLD AUTO: 8.22 10*3/MM3 (ref 3.4–10.8)

## 2024-12-29 PROCEDURE — 25810000003 SODIUM CHLORIDE 0.9 % SOLUTION 250 ML FLEX CONT: Performed by: STUDENT IN AN ORGANIZED HEALTH CARE EDUCATION/TRAINING PROGRAM

## 2024-12-29 PROCEDURE — 85007 BL SMEAR W/DIFF WBC COUNT: CPT | Performed by: STUDENT IN AN ORGANIZED HEALTH CARE EDUCATION/TRAINING PROGRAM

## 2024-12-29 PROCEDURE — 85025 COMPLETE CBC W/AUTO DIFF WBC: CPT | Performed by: STUDENT IN AN ORGANIZED HEALTH CARE EDUCATION/TRAINING PROGRAM

## 2024-12-29 PROCEDURE — 25010000002 IRON SUCROSE PER 1 MG: Performed by: STUDENT IN AN ORGANIZED HEALTH CARE EDUCATION/TRAINING PROGRAM

## 2024-12-29 RX ADMIN — ACETAMINOPHEN 650 MG: 325 TABLET, FILM COATED ORAL at 17:47

## 2024-12-29 RX ADMIN — IBUPROFEN 600 MG: 600 TABLET, FILM COATED ORAL at 08:14

## 2024-12-29 RX ADMIN — CYCLOBENZAPRINE 10 MG: 10 TABLET, FILM COATED ORAL at 11:37

## 2024-12-29 RX ADMIN — IBUPROFEN 600 MG: 600 TABLET, FILM COATED ORAL at 20:40

## 2024-12-29 RX ADMIN — DOCUSATE SODIUM 100 MG: 100 CAPSULE, LIQUID FILLED ORAL at 08:14

## 2024-12-29 RX ADMIN — IRON SUCROSE 300 MG: 20 INJECTION, SOLUTION INTRAVENOUS at 13:33

## 2024-12-29 RX ADMIN — IBUPROFEN 600 MG: 600 TABLET, FILM COATED ORAL at 02:09

## 2024-12-29 RX ADMIN — ACETAMINOPHEN 650 MG: 325 TABLET, FILM COATED ORAL at 23:00

## 2024-12-29 RX ADMIN — FERROUS SULFATE TAB 325 MG (65 MG ELEMENTAL FE) 325 MG: 325 (65 FE) TAB at 08:14

## 2024-12-29 RX ADMIN — IBUPROFEN 600 MG: 600 TABLET, FILM COATED ORAL at 14:32

## 2024-12-29 RX ADMIN — OXYCODONE 5 MG: 5 TABLET ORAL at 16:19

## 2024-12-29 RX ADMIN — ACETAMINOPHEN 650 MG: 325 TABLET, FILM COATED ORAL at 10:29

## 2024-12-29 NOTE — NURSING NOTE
Patient changed per mind. States she is now fine redrawing her CBC. Nursing assistant will redraw shortly.    Bleeding scant, fundus firm. No drainage or bleeding from incision site.

## 2024-12-29 NOTE — PROGRESS NOTES
CHEYANNE Nelson  Postpartum Note    Subjective   Postpartum Day 2:  Primary Low Transverse  Section and  bilateral salpingectomy    Patient reports some improvement in her shoulder pain with the muscle relaxer. Her pain is otherwise well controlled with nonsteroidal anti-inflammatory drugs, Tylenol, and prescribed pain medications. She is ambulating and voiding well.  Bleeding is appropriate for pp period. Denies lightheadedness, dizziness, or fatigue.      Objective     Vitals:  Vitals:    24 1336 24 1556 24 2305 24 0733   BP: 92/55 105/70 103/67 99/63   BP Location: Left arm Left arm Right arm Right arm   Patient Position: Sitting Sitting Lying Sitting   Pulse: 88 94 108 102   Resp: 18 16 19 20   Temp: 97.4 °F (36.3 °C) 97.6 °F (36.4 °C) 98.6 °F (37 °C) 98.1 °F (36.7 °C)   TempSrc: Oral Oral Oral Oral   SpO2: 99% 96% 98% 99%   Weight:       Height:           Physical Exam:  General:  no acute distress, pale.  Abdomen: Fundus firm below umbilicus, nontender and incision clean, dry, and intact, covered with steristrips  Extremities: moves all extremities well, no cyanosis or erythema, 1+ edema      Labs:  Results from last 7 days   Lab Units 24  0902 24  0521 24  0446   WBC 10*3/mm3 10.40 8.22 11.35*   HEMOGLOBIN g/dL 7.2* 6.1* 7.7*   HEMATOCRIT % 25.8* 22.2* 28.0*   PLATELETS 10*3/mm3 294 265 288              Feeding method: Breastfeeding Status: Yes     Blood Type: RH Positive        Assessment & Plan     Active Problems:    Status post primary low transverse  section    Status post bilateral salpingectomy    Acute on chronic anemia      Myrtle Clifford is Day 2  post-partum from a  Primary Low Transverse  Section and  bilateral salpingectomy      Plan:  Continue current care. Hgb 9.3 > PP Hgb 7.7 > 6.1(falsely low) > 7.2(repeat). Will give another dose of IV iron. Encouraged simethicone & analgesics PRN. Plan for DC home POD#3 pending meeting PP  Milestones.      Ninfa Dudley MD  12/29/2024  11:08 EST

## 2024-12-29 NOTE — NURSING NOTE
Patient Hgb dropped to 6.1 per morning CBC 12/29. MD ordered a redraw. Patient refused redraw because she plans to refuse blood transfusion even if redraw results below 7. Patient states she feels fine, is not lightheaded or symptomatic. RN will evaluate bleeding with morning assessment, but no issues overnight.

## 2024-12-29 NOTE — LACTATION NOTE
This note was copied from a baby's chart.  Mother continues to offer breast followed by formula. Mother reports breastfeeding is going well. Milk is in. Nipples are non-tender. States she recently fed baby. Encouraged to call for feeding observation.

## 2024-12-29 NOTE — PAYOR COMM NOTE
"CLINICALS FOR PENDING INPATIENT MATERNITY PRECERT:        AUTH# [id number]  Stephanie Clifford (29 y.o. Female) 1995            AUTHORIZATION PENDING:   PLEASE CALL OR FAX DETERMINATION TO CONTACT BELOW. THANK YOU.        Angela Angulo, RN MSN  /UR  [facility]  [phone] office  [phone] fax  [email or web address]    [facility]  NPI: [id number]  Tax: [phone]          Stephanie Clifford (29 y.o. Female)       Date of Birth   1995    Social Security Number       Address   [address] [place] [facility]    Home Phone   [phone]    MRN   [id number]       Mandaeism   None    Marital Status   Significant Other                            Admission Date   12/27/24    Admission Type   Elective    Admitting Provider   Fany Parra MD    Attending Provider   Fany Parra MD    Department, Room/Bed   [facility] MOTHER BABY, M417/1       Discharge Date       Discharge Disposition       Discharge Destination                                 Attending Provider: Fany Parra MD    Allergies: Bactrim [Sulfamethoxazole-trimethoprim]    Isolation: None   Infection: None   Code Status: CPR    Ht: 157.5 cm (62\")   Wt: 74 kg (163 lb 2.3 oz)    Admission Cmt: None   Principal Problem: Pregnant and not yet delivered [Z34.90]                   Active Insurance as of 12/27/2024       Primary Coverage       Payor Plan Insurance Group Employer/Plan Group    [id number]        Payor Plan Address Payor Plan Phone Number Payor Plan Fax Number Effective Dates    [address]   7/1/2024 - None Entered    [facility] [id number]         Subscriber Name Subscriber Birth Date Member ID       STEPHANIE CLIFFORD 1995 [id number]                     Emergency Contacts        (Rel.) Home Phone Work Phone Mobile Phone    LIVIER CLIFFORD (Spouse) -- -- [phone]          12/27/24 1553  Admit To Obstetrics Inpatient  " Once     Completed     Diagnosis: Pregnant and not yet delivered [9020672]             History & Physical        Fany Parra MD at 24 1623          [facility]  Obstetric History and Physical     Chief Complaint: patient presents from the the office for PLTCD secondary to persistent decreased FM     Subjective    Patient is a 29 y.o. female  currently at 37+2 , who presents with persistent decreased FM from the office. Since the end of November patient has reported persistent decreased FM that has gotten worst. She reports she goes days without feeling movement. She said her symptoms have gotten worst over the past week where she has not been able to eat or drink or keep any food down. She has had multiple ED visits including [facility] and also to [facility]. She has had many weekly  testing that has been reassuring. Had  testing today with  BPP, NST non reactive in office with variable deceleration and minimal variability. Patient has high anxiety about decreased fetal movement and it has worsened over the past week.    Her prenatal care is multiparous, anxiety/depression, persistent decreased FM, tachycardia, anemia, and prior infant with clavicle fracture and desires PLTCD, and patient desires permanent sterilization.       Prenatal Information:  See office H&P for full details and labs.      Past OB History:       OB History    Para Term  AB Living   8 4 4 0 2 4   SAB IAB Ectopic Molar Multiple Live Births   1 0 1 0 0 4               Past Medical History: Past Medical History:   Diagnosis Date    Anxiety associated with depression     Ectopic pregnancy     Hidradenitis         Past Surgical History Past Surgical History:   Procedure Laterality Date    CHOLECYSTECTOMY  2016    CYST REMOVAL      ECTOPIC PREGNANCY  2019    MASS EXCISION Right 2023    Procedure: SKIN LESION EXCISION, right axilla, patent supine with arm up;  Surgeon: Dion Carmona MD;   "Location: [facility] MAIN OR;  Service: General;  Laterality: Right;    TEAR DUCT SURGERY Bilateral     as a baby    TONSILLECTOMY      young child        Family History: Family History   Problem Relation Age of Onset    Diabetes Mother     Hypertension Father     Diabetes Father     Hyperlipidemia Father     Diabetes Maternal Grandmother     Cancer Maternal Grandmother     Diabetes Maternal Grandfather     Cancer Maternal Grandfather       Social History:  reports that she quit smoking about 8 years ago. Her smoking use included cigarettes. She started smoking about 10 years ago. She has a 1 pack-year smoking history. She has never been exposed to tobacco smoke. She has never used smokeless tobacco.   reports that she does not currently use alcohol.   reports no history of drug use.        General ROS: Pertinent items are noted in HPI    Objective     Vitals:     Vitals:    12/27/24 1551   Weight: 74 kg (163 lb 2.3 oz)   Height: 157.5 cm (62\")       Fetal Heart Rate Assessment:   Cat II with intermittent variable decelerations    [place]:   irregular     Physical Exam:     General Appearance:    Alert, cooperative, in no acute distress   Abdomen:     Soft, non-tender, no guarding, no rebound tenderness,       EFW < 7#   Pelvic Exam:    Pelvis adequate.    Presentation: Vertex    Cervix: deferred today   Extremities:   Moves all extremities well, no edema, no cyanosis, no              redness   Skin:   No bleeding, bruising or rash   Neurologic:   No focal neurologic defect          Laboratory Results:   Lab Results (last 48 hours)       ** No results found for the last 48 hours. **               Assessment & Plan    Principal Problem:    Pregnant and not yet delivered         Assessment:  1.  Intrauterine pregnancy at 37+2 wks gestation.    2.  Risk reducing PLTCD secondary to persistent decreased fetal movement, and elective PLTCD due to prior infant with clavicle fracture  3.  GBS status:Negative    Plan:  1. Primary " LTCS and bilateral tubal ligation  Last infant > 8# and largest infant with clavicle fracture. Patient reports due to prior trauma with that delivery she desires PLTCD and bilateral tubal ligation. Patient given strict counseling even with smaller infant subjected to be < 8# she does not want to proceed with vaginal delivery.   Desires permanent sterilization, counseled on r/b/a including other reversible options such as LARC including nexplanon and intrauterine device, also counseled on partner obtaining vasectomy.   Discussed risk of surgery with  bleeding, infection, damage to surrounding structures such as bowel or bladder, risk of regret with higher risk of regret and desire for more children in the future when performed at a younger age.   Also counseled on risk of anesthesia, including VTE and death.  Plan for PLTCD and bilateral salpingectomy   Tubal consent signed 2024.    Patient is having early term delivery secondary to decreased FM.   MFM consultation completed with Dr. Gabbi Juarez due to persistent decreased FM, even with  testing that is reassuring, due to change in symptoms that have worsened and variable decelerations on monitoring, imminent delivery is indicated d/t risk of stillbirth despite normal prior testing.  Patient has received extensive counseling on risks of early term delivery such as prolonged NICU stay, respiratory depression and support for fetus, hypoglycemia, and jaundice. Patient desires to proceed despite counseling above.      2. Plan of care has been reviewed with patient.  3.  Risks, benefits of treatment plan have been discussed.  4.  All questions have been answered.         Fany Parra MD   2024   16:23 EST    Electronically signed by Fany Parra MD at 24 7814          Operative/Procedure Notes (all)        Fany Parra MD at 24 1820  Version 1 of [address] [place] [address]   Section Operative Note    Preoperative Dx:   1.  Patient  is a 29 y.o. female  currently at 37w2d, who presents with persistent decreased fetal movement for PLTCD.    2. PLTCD secondary to prior child/ infant with birth trauma, prior clavicle fracture in last   3. Anxiety  4. Anemia  5. Desires permanent sterilization       Postoperative dx:    1.  Same as above  2. S/P bilateral salpingectomy   3. Fetal nuchal cord     Procedure: Primary  and and bilateral salpingectomy    Surgeon/Assistant: Fany Parra MD   Anesthesia:  Anesthesiologist:  Spinal  Anesthesiologist: Gordy Jimenez MD  CRNA: Andie English CRNA     EBL:  800m, QBL pending     Antibiotics: cefazolin (Ancef)     Infant    Findings: VFI     Apgars: 8 and 8 at 1 and 5 minutes.        Procedure Details:     Patient was taken to the OR where she was prepped and draped in the usual sterile fashion with a catheter and a left tilt.  Spinal anesthesia was found to be adequate.    A Pfannenstiel skin incision was made with the scalpel and carried through to the underlying layer of fascia with the scalpel.  The fascial incision was extended laterally with the Arriola scissors and  from the underlying rectus muscles superiorly and inferiorly.  The rectus muscles were  in the midline and the peritoneum was entered bluntly.  The peritoneal incision was extended laterally and the Torres retractor was placed.  The bladder flap was created sharply.   A low transverse uterine incision was made with the scalpel and extended cephalocaudally. The infant's head, shoulders, and body were delivered but there was a tight nuchal cord x 1 around infants neck that had to be reduced. Nose and mouth were bulb suctioned and infant handed to awaiting nurse with good cry, color, tone, and movement of all extremities.    Placenta was delivered spontaneously intact with a three-vessel cord. Placenta was sent to pathology due to persistent decreased fetal movement.   The uterus was exteriorized and  cleared of all clots and debris.    The uterine incision was repaired with 0 Monocryl in a running, locked fashion. The right lateral edge of hysterotomy had oozing, a figure of eight suture using chromic suture was used to ensure hemostasis.   Attention was then turned to the sterilization procedure, patient confirmed again that she wanted proceed with bilateral tubal ligation. Both left and right ovaries were visualized and appear normal. The right fallopian tube was grasped with babcocks and followed down to the fimbria, the mesosalpinx was transected using electrocautery and suture ligated up to approximately 2 cm from the cornua using the Ligasure device. Attention was turned to the left side and procedure was repeated, performing complete bilateral salpingectomy.   The uterus was inspected prior  and confirmed hemostasis, then returned to the abdomen, the gutters were cleared of all clots and debris, including posterior gutter.   The peritoneum was reapproximated with 3-0 Monocryl in a running fashion.  The fascia was closed with 0 Vicryl in a running fashion.    The subcutaneous fat was irrigated, confirmed hemostasis with electrocautery.  The skin was closed with 4-0 vicryl in a subcuticular fashion.   Sponge, lap, and needle counts were correct x 2.        Complications:   None      Disposition:   Mother to Mother Baby/Postpartum  in stable condition currently.   Baby to remains with mom  in stable condition currently.    Findings: normal appearing bilateral fallopian tubes and ovaries  Pathology: cord blood, bilateral fallopian tubes, and placenta sent                              2024  18:21 EST    Fany Parra MD/MPH        Electronically signed by Fany Parra MD at 24 1826          Physician Progress Notes (all)        Ninfa Dudley MD at 24 1108          [facility]  Postpartum Note    Subjective   Postpartum Day 2:  Primary Low Transverse  Section and  bilateral  salpingectomy    Patient reports some improvement in her shoulder pain with the muscle relaxer. Her pain is otherwise well controlled with nonsteroidal anti-inflammatory drugs, Tylenol, and prescribed pain medications. She is ambulating and voiding well.  Bleeding is appropriate for pp period. Denies lightheadedness, dizziness, or fatigue.      Objective     Vitals:  Vitals:    24 1336 24 1556 24 2305 24 0733   BP: 92/55 105/70 103/67 99/63   BP Location: Left arm Left arm Right arm Right arm   Patient Position: Sitting Sitting Lying Sitting   Pulse: 88 94 108 102   Resp: 18 16 19 20   Temp: 97.4 °F (36.3 °C) 97.6 °F (36.4 °C) 98.6 °F (37 °C) 98.1 °F (36.7 °C)   TempSrc: Oral Oral Oral Oral   SpO2: 99% 96% 98% 99%   Weight:       Height:           Physical Exam:  General:  no acute distress, pale.  Abdomen: Fundus firm below umbilicus, nontender and incision clean, dry, and intact, covered with steristrips  Extremities: moves all extremities well, no cyanosis or erythema, 1+ edema      Labs:  Results from last 7 days   Lab Units 24  0902 24  0521 24  0446   WBC 10*3/mm3 10.40 8.22 11.35*   HEMOGLOBIN g/dL 7.2* 6.1* 7.7*   HEMATOCRIT % 25.8* 22.2* 28.0*   PLATELETS 10*3/mm3 294 265 288              Feeding method: Breastfeeding Status: Yes     Blood Type: RH Positive        Assessment & Plan     Active Problems:    Status post primary low transverse  section    Status post bilateral salpingectomy    Acute on chronic anemia      Myrtle Clifford is Day 2  post-partum from a  Primary Low Transverse  Section and  bilateral salpingectomy      Plan:  Continue current care. Hgb 9.3 > PP Hgb 7.7 > 6.1(falsely low) > 7.2(repeat). Will give another dose of IV iron. Encouraged simethicone & analgesics PRN. Plan for DC home POD#3 pending meeting PP Milestones.      Ninfa Dudley MD  2024  11:08 EST                Electronically signed by Ninfa Dudley MD  at 24 1426       Ninfa Dudley MD at 24 1040           [facility]  Postpartum Note    Subjective   Postpartum Day 1:  Primary Low Transverse  Section and  bilateral salpingectomy    Patient reports some shoulder pain bilaterally that is worsened with movement. Her pain is otherwise well controlled with nonsteroidal anti-inflammatory drugs, Tylenol, and prescribed pain medications. She is ambulating and voiding well.  Bleeding is appropriate for pp period.      Objective     Vitals:  Vitals:    24 2037 24 2150 24 0256 24 0755   BP: 106/72 100/68 117/71 103/67   BP Location: Left arm Left arm Right arm Left arm   Patient Position: Lying Lying Lying Sitting   Pulse: 60 86 83 86   Resp: 16 16 19 16   Temp:  97.9 °F (36.6 °C) 97.8 °F (36.6 °C) 97.9 °F (36.6 °C)   TempSrc:  Oral Oral Oral   SpO2: 100% 99% 99% 97%   Weight:       Height:           Physical Exam:  General:  no acute distress.  Abdomen: Fundus firm below umbilicus, nontender and incision clean, dry, and intact, small area of dried blood on bandage, within marked lines  Extremities: moves all extremities well, no cyanosis or erythema, 1+ edema      Labs:  Results from last 7 days   Lab Units 24  0446 24  1621   WBC 10*3/mm3 11.35* 9.65   HEMOGLOBIN g/dL 7.7* 9.3*   HEMATOCRIT % 28.0* 33.2*   PLATELETS 10*3/mm3 288 313              Feeding method: Breastfeeding Status: Yes     Blood Type: RH Positive        Assessment & Plan     Active Problems:    Status post primary low transverse  section    Status post bilateral salpingectomy    Acute on chronic anemia      Myrtle Clifford is Day 1  post-partum from a  Primary Low Transverse  Section and  bilateral salpingectomy      Plan:  Continue current care. Will give IV iron due to PP Hgb 7.7, down from 9.3. Encouraged simethicone & added Flexeril for shoulder pain. Plan for DC home POD#2-3 pending meeting PP Milestones.      Ninfa Dudley,  MD  12/28/2024  10:44 EST                Electronically signed by Ninfa Dudley MD at 12/28/24 1707       Consult Notes (all)    No notes of this type exist for this encounter.

## 2024-12-29 NOTE — PLAN OF CARE
Goal Outcome Evaluation:     Patient ambulates to bathroom and voids spontaneously without difficulty. Pain controlled by scheduled meds and oxycodone. Patient educated on possible side effects of taking narcotics. Patient verbalized understanding. Will continue to monitor.

## 2024-12-30 VITALS
TEMPERATURE: 98.4 F | HEART RATE: 108 BPM | RESPIRATION RATE: 16 BRPM | HEIGHT: 62 IN | OXYGEN SATURATION: 97 % | DIASTOLIC BLOOD PRESSURE: 76 MMHG | SYSTOLIC BLOOD PRESSURE: 115 MMHG | BODY MASS INDEX: 30.02 KG/M2 | WEIGHT: 163.14 LBS

## 2024-12-30 RX ORDER — IBUPROFEN 600 MG/1
600 TABLET, FILM COATED ORAL EVERY 6 HOURS
Qty: 30 TABLET | Refills: 0 | Status: SHIPPED | OUTPATIENT
Start: 2024-12-30

## 2024-12-30 RX ORDER — OXYCODONE HYDROCHLORIDE 5 MG/1
5 TABLET ORAL EVERY 8 HOURS PRN
Qty: 8 TABLET | Refills: 0 | Status: SHIPPED | OUTPATIENT
Start: 2024-12-30 | End: 2025-01-02

## 2024-12-30 RX ADMIN — ACETAMINOPHEN 650 MG: 325 TABLET, FILM COATED ORAL at 06:04

## 2024-12-30 RX ADMIN — IBUPROFEN 600 MG: 600 TABLET, FILM COATED ORAL at 02:31

## 2024-12-30 RX ADMIN — FERROUS SULFATE TAB 325 MG (65 MG ELEMENTAL FE) 325 MG: 325 (65 FE) TAB at 08:45

## 2024-12-30 RX ADMIN — IBUPROFEN 600 MG: 600 TABLET, FILM COATED ORAL at 08:45

## 2024-12-30 RX ADMIN — OXYCODONE 5 MG: 5 TABLET ORAL at 00:16

## 2024-12-30 RX ADMIN — OXYCODONE 5 MG: 5 TABLET ORAL at 11:32

## 2024-12-30 RX ADMIN — ACETAMINOPHEN 650 MG: 325 TABLET, FILM COATED ORAL at 10:51

## 2024-12-30 NOTE — LACTATION NOTE
This note was copied from a baby's chart.  Mother reports feeding at breast are going well. Breasts are full, soften with feedings. Nipples non-tender. Observed partial feeding in cradle hold on left side. Baby latched wide, frequent audible swallowing noted. Potential for discharge today. Discussed first night at home. Provided with  discharge weight ticket and lactation contact card. Encouraged to call as needed.

## 2024-12-30 NOTE — DISCHARGE SUMMARY
[facility]  Delivery Discharge Summary    Primary OB Clinician: Fany Parra MD    Admission Diagnosis: TIUP  Active Problems:    Status post primary low transverse  section    Status post bilateral salpingectomy    Acute on chronic anemia      Discharge Diagnosis:  delivered    Gestational Age: 37w2d    Date of Delivery: 2024    Delivered By:  Fany Parra    Delivery Type: , Low Transverse     Tubal Ligation: done with c/section    Intrapartum Course: Uncomplicated delivery.     Postpartum Course:  Pt was admitted and underwent  Primary Low Transverse  Section and  Bilateral salpingectomy. Pt was transferred to PP where she had an uncomplicated course. Pt remained AFVSS, had scant lochia and pain was well controlled. Pt d/c home in stable condition and will f/u in office for PP visit as scheduled or PRN. Currently both breast and bottle feeding. Plans on BTL  for contraception.     Physical Exam:    Vitals:   Vitals:    24 0733 24 1450 24 0010 24 0730   BP: 99/63 111/70 137/76 117/73   BP Location: Right arm Right arm Right arm Right arm   Patient Position: Sitting Sitting Sitting Sitting   Pulse: 102 118 110 113   Resp: 20 17 16 16   Temp: 98.1 °F (36.7 °C) 98.2 °F (36.8 °C) 98.3 °F (36.8 °C) 98.4 °F (36.9 °C)   TempSrc: Oral Oral Oral Oral   SpO2: 99% 98% 97% 97%   Weight:       Height:         Temp (24hrs), Av.3 °F (36.8 °C), Min:98.2 °F (36.8 °C), Max:98.4 °F (36.9 °C)      General Appearance:    Alert, cooperative, in no acute distress   Abdomen:     Soft non-tender, non-distended, no guarding, no rebound         tenderness.   Extremities:   Moves all extremities well, no edema, no cyanosis, no              Redness.   Incision:  Clean/Dry/Intact and Sutures intact   Fundus:   Firm, below umbilicus     Feeding method: Breastfeeding Status: Yes    Labs:  Results from last 7 days   Lab Units 24  0902 24  0521 24  0446   WBC 10*3/mm3  10.40 8.22 11.35*   HEMOGLOBIN g/dL 7.2* 6.1* 7.7*   HEMATOCRIT % 25.8* 22.2* 28.0*   PLATELETS 10*3/mm3 294 265 288           Blood Type: RH Positive      Plan:  Discharge to home.    Follow-up appointment with Dr Parra in 6 weeks.   All discharge instructions were reviewed with pt including bleeding warnings, s/sx of PP depression, and warning signs in the PP period for which to seek medical attention including but not limited to s/sx of hypertension and thromboembolism.     MARICRUZ Torres  12/30/2024  10:42 EST

## 2024-12-30 NOTE — PLAN OF CARE
Goal Outcome Evaluation:  Plan of Care Reviewed With: patient        Progress: improving  Outcome Evaluation: pt voiding and ambulating adequately. pt okay to discharge home per APRN.

## 2024-12-30 NOTE — CASE MANAGEMENT/SOCIAL WORK
Social Work Assessment  [facility]     Patient Name: Myrtle Clifford  MRN: [id number]  Today's Date: 12/30/2024    Admit Date: 12/27/2024       Discharge Plan       Row Name 12/30/24 1313       Plan    Plan Routine home with family.    Patient/Family in Agreement with Plan yes    Plan Comments LSW met with patient and father of baby (KEITH Knott), patient’s daughter (Amber), and patient’s friend (Elaina) at bedside, introduced self/role, and reason for visit. LSW asked patient if she has a PCP for herself and a pediatrician for infant. Patient stated she has a PCP and is establishing infant at pediatrician where other children attend. LSW inquired about insurance and patient stated that Medicaid had already been in contact with them this morning to get infant added to their coverage. LSW asked patient about having supplies for infant (diapers, wipes, car seat, crib, pack-and-play, etc.) and patient stated they have everything. Patient’s daughter reminded KEITH that the base for the car seat still needed to be inserted into their car. FOKATHRYN stated that his mother fell and was admitted two floors down and he has been going back and forth between rooms during current admission, but will get the base installed prior to them leaving. Patient was able to teach back safe sleep to LSW. LSW inquired about previous mental health treatment and patient stated that she is currently taking an anti-depressant, but isn’t involved in therapy or counseling. LSW asked about utilizing [place] services or community resources and patient stated they are current with [place]. LSW instructed patient to contact WIC office within ten days of infant’s birth. Patient verbalized understanding and stated she is going to update her SNAP benefits as well. LSW discussed postpartum signs/symptoms, mental health treatment agencies, community resources ([facility], [facility], Healthy Families, Choices for Women and Families, etc.) and offered all information discussed at  bedside. Patient agreeable to receiving and LSW provided at bedside. Patient instructed on how to request follow up from social work and has no further needs at this time.             JOSR Lopez, LSW    Phone: [phone]  Fax: [phone]  [email or web address]

## 2024-12-31 NOTE — PAYOR COMM NOTE
"NOTIFICATION OF DISCHARGE:          AUTH# [id number]  Stephanie Gaxiola (29 y.o. Female) 1995      DISCHARGED TO HOME ON 12/30/2024.        THANKS,              Angela Angulo, RN MSN  /UR  [facility]  [phone] office  [phone] fax  [email or web address]    [facility]  NPI: [id number]  Tax: [id number]          Stephanie Gaxiola (29 y.o. Female)       Date of Birth   1995    Social Security Number       Address   [address] [place] [address]    Home Phone   [phone]    MRN   [id number]       Jehovah's witness   None    Marital Status   Significant Other                            Admission Date   12/27/24    Admission Type   Elective    Admitting Provider   Fany Parra MD    Attending Provider       Department, Room/Bed   [facility] MOTHER BABY, M417/1       Discharge Date   12/30/2024    Discharge Disposition   Home or Self Care    Discharge Destination                                 Attending Provider: (none)   Allergies: Bactrim [Sulfamethoxazole-trimethoprim]    Isolation: None   Infection: None   Code Status: Prior    Ht: 157.5 cm (62\")   Wt: 74 kg (163 lb 2.3 oz)    Admission Cmt: None   Principal Problem: Pregnant and not yet delivered [Z34.90]                   Active Insurance as of 12/27/2024       Primary Coverage       Payor Plan Insurance Group Employer/Plan Group    [facility] [id number]        Payor Plan Address Payor Plan Phone Number Payor Plan Fax Number Effective Dates    [address]   7/1/2024 - None Entered    [facility] [id number]         Subscriber Name Subscriber Birth Date Member ID       STEPHANIE GAXIOLA 1995 [id number]                     Emergency Contacts        (Rel.) Home Phone Work Phone Mobile Phone    LIVIER GAXIOLA (Spouse) -- -- [phone]                 Discharge Summary        Niesha Leal APRN at 12/30/24 1042          [facility]  Delivery Discharge " Summary    Primary OB Clinician: Fany Parra MD    Admission Diagnosis: TIUP  Active Problems:    Status post primary low transverse  section    Status post bilateral salpingectomy    Acute on chronic anemia      Discharge Diagnosis:  delivered    Gestational Age: 37w2d    Date of Delivery: 2024    Delivered By:  Fany Parra    Delivery Type: , Low Transverse     Tubal Ligation: done with c/section    Intrapartum Course: Uncomplicated delivery.     Postpartum Course:  Pt was admitted and underwent  Primary Low Transverse  Section and  Bilateral salpingectomy. Pt was transferred to PP where she had an uncomplicated course. Pt remained AFVSS, had scant lochia and pain was well controlled. Pt d/c home in stable condition and will f/u in office for PP visit as scheduled or PRN. Currently both breast and bottle feeding. Plans on BTL  for contraception.     Physical Exam:    Vitals:   Vitals:    24 0733 24 1450 24 0010 24 0730   BP: 99/63 111/70 137/76 117/73   BP Location: Right arm Right arm Right arm Right arm   Patient Position: Sitting Sitting Sitting Sitting   Pulse: 102 118 110 113   Resp: 20 17 16 16   Temp: 98.1 °F (36.7 °C) 98.2 °F (36.8 °C) 98.3 °F (36.8 °C) 98.4 °F (36.9 °C)   TempSrc: Oral Oral Oral Oral   SpO2: 99% 98% 97% 97%   Weight:       Height:         Temp (24hrs), Av.3 °F (36.8 °C), Min:98.2 °F (36.8 °C), Max:98.4 °F (36.9 °C)      General Appearance:    Alert, cooperative, in no acute distress   Abdomen:     Soft non-tender, non-distended, no guarding, no rebound         tenderness.   Extremities:   Moves all extremities well, no edema, no cyanosis, no              Redness.   Incision:  Clean/Dry/Intact and Sutures intact   Fundus:   Firm, below umbilicus     Feeding method: Breastfeeding Status: Yes    Labs:  Results from last 7 days   Lab Units 24  0902 24  0521 24  0446   WBC 10*3/mm3 10.40 8.22 11.35*   HEMOGLOBIN  g/dL 7.2* 6.1* 7.7*   HEMATOCRIT % 25.8* 22.2* 28.0*   PLATELETS 10*3/mm3 294 265 288           Blood Type: RH Positive      Plan:  Discharge to home.    Follow-up appointment with Dr Parra in 6 weeks.   All discharge instructions were reviewed with pt including bleeding warnings, s/sx of PP depression, and warning signs in the PP period for which to seek medical attention including but not limited to s/sx of hypertension and thromboembolism.     MARICRUZ Torres  12/30/2024  10:42 EST      Electronically signed by Niesha Leal APRN at 12/30/24 1043       Discharge Order (From admission, onward)       Start     Ordered    12/30/24 1041  Discharge patient  Once        Expected Discharge Date: 12/30/24   Discharge Disposition: Home or Self Care   Physician of Record for Attribution - Please select from Treatment Team: DEQUAN PARRA [[id number]]   Review needed by CMO to determine Physician of Record: No      Question Answer Comment   Physician of Record for Attribution - Please select from Treatment Team DEQUAN PARRA    Review needed by CMO to determine Physician of Record No        12/30/24 1047

## 2025-01-08 ENCOUNTER — ANESTHESIA (OUTPATIENT)
Dept: LABOR AND DELIVERY | Facility: HOSPITAL | Age: 30
End: 2025-01-08
Payer: MEDICAID

## 2025-01-16 ENCOUNTER — PATIENT OUTREACH (OUTPATIENT)
Dept: LABOR AND DELIVERY | Facility: HOSPITAL | Age: 30
End: 2025-01-16
Payer: MEDICAID

## 2025-01-16 NOTE — OUTREACH NOTE
Motherhood Connection  Unable to Reach    Questions/Answers      Flowsheet Row Responses   Pending Outreach Postpartum Check-in   Call Attempt First   Outcome Left message, Ziliftt message sent to patient              Blanca Zavala RN  Maternity Nurse Navigator    1/16/2025, 14:18 EST

## 2025-01-21 ENCOUNTER — PATIENT OUTREACH (OUTPATIENT)
Dept: LABOR AND DELIVERY | Facility: HOSPITAL | Age: 30
End: 2025-01-21
Payer: MEDICAID

## 2025-01-22 ENCOUNTER — PATIENT OUTREACH (OUTPATIENT)
Dept: CALL CENTER | Facility: HOSPITAL | Age: 30
End: 2025-01-22
Payer: MEDICAID

## 2025-01-22 NOTE — OUTREACH NOTE
Motherhood Connection  Postpartum Check-In    Questions/Answers      Flowsheet Row Responses   Visit Setting Telephone   Best Method for Contacting Cell   OB Discharge Note Reviewed  Reviewed   OB Discharge Navigator Reviewed  Reviewed   OB Discharge Medications Reviewed  Reviewed    discharged home with mother? Yes   Current Pain Levels 0-10 0   At Rest Pain Levels 0-10 0   Pain level with activity 0-10 0   Acceptable Pain Level 0-10 0   Verbalized Emotional State Acceptance   Family/Support Network Significant Other   Do you feel comfortable in your relationship with your baby? Yes   Have members of your household adjusted to your baby? Yes   Is the baby's father supportive and/or involved with the baby? Yes   How does your partner feel about the baby? Happy, Involved   Do you feel safe at home, school and work? Yes   Are you in a relationship with someone who threatens you or hurts you? No   Do you have the resources to keep yourself and your baby healthy and safe? Yes   Lochia (per patient report) Brown-live Red   Amount Spotting  [occasional spotting]   Number of pads per day 1   Is patient breastfeeding? Yes, pumping   Storage of Milk also supplementing with formula   Followup Appointments Made Yes   Well Child Visit Appointments Made Yes   Appointment Date 25   Provider/Agency Dr Parra   Well Child Checkup Provider Name [facility]- going weekly for weight checks   Well Child Check Up Date: 25   Umbilical Cord No reported signs or symptoms   Was the baby circumcised? No   Infant Feeding Method Expressed Breast Milk, Formula   Formula/Expressed Milk frequency of feedings: 3-4hrs   Expressed milk PO (mL) 2oz   Expressed milk- frequency of feedings 2oz   Number of wet diapers x 24 hours 6   Last BM x 24 hours 4   Emesis (Unmeasured Occurence) 0   What safe sleep surface is available? Bassinet   Are there stuffed animals, toys, pillows, quilts, blankets, wedges, positioners, bumpers or other loose  bedding in the infant's sleeping environment? No   Where does the baby usually sleep? Bassinet   Does the baby ever share a sleep surface with a sibling, adult or pet? No   Does the baby ever share a sleep surface in a bed, couch, recliner or other? No   What position do you place your baby to sleep for naps? Back   What position do you place your baby to sleep at night Back   Are you and/or other caregivers smoking inside or outside the baby's home? No   Is the infant dressed appropiately for the temperature of the home? Yes  [swaddle, discussed using sleepsack and/or  footed sleeper for sleep]   Do you use a clean, dry pacifier that is not attached to a string or stuffed animal? Yes            Review of Systems    Most Recent [place]  Depression Scale Score (EPDS)    Performed by a clinician: 0 (2025  7:08 PM)    Received via [employer] questionnaire:  ()       Blanca Zavala RN  Maternity Nurse Navigator    2025, 19:18 EST

## 2025-01-22 NOTE — OUTREACH NOTE
Motherhood Connection Survey      Flowsheet Row Responses   [facility] facility patient discharged from? [facility]   Week 1 attempt successful? Yes   Call start time 948   Call end time 951   Baby sex Girl   Baby sex Girl   Delivery type    Emotional state Acceptance   Family support Yes   Do you have all necessary resources to care for you and your baby?  Yes   Have members of your household adjusted to your baby? Yes   Did you have any problems with pre-eclampsia during this pregnancy? No   Did you have blood glucose issues during this pregnancy No   Lochia amount Light   Did you have an episiotomy/tear/abdominal incision? Yes   Additional comments Healing well   Feeding Method Bottle   Frequency 2-3 hours   Amount 2 -4 ounces   Signs baby is ready to eat Rooting, Crying   Number of wet diapers x 24 hours 8   Last BM x 24 hours 4   Umbilical Cord No reported signs or symptoms   Where does the baby usually sleep? Bassinet   Are there stuffed animals, toys, pillows, quilts, blankets, wedges, positioners, bumpers or other loose bedding in the infant's sleeping environment? No   Does the baby ever share a sleep surface in a bed, couch, recliner or other? No   What position do you lay your baby down to sleep? Back   Mom appointment comments: 25   Baby appointment comments: 25   Call completed? Yes              Simona LEWIS - Registered Nurse

## 2025-05-18 ENCOUNTER — HOSPITAL ENCOUNTER (EMERGENCY)
Facility: HOSPITAL | Age: 30
Discharge: HOME | End: 2025-05-19
Attending: EMERGENCY MEDICINE | Admitting: EMERGENCY MEDICINE
Payer: MEDICAID

## 2025-05-18 ENCOUNTER — APPOINTMENT (OUTPATIENT)
Dept: GENERAL RADIOLOGY | Facility: HOSPITAL | Age: 30
End: 2025-05-18
Payer: MEDICAID

## 2025-05-18 DIAGNOSIS — R00.2 PALPITATIONS: Primary | ICD-10-CM

## 2025-05-18 LAB
ALBUMIN SERPL-MCNC: 4.1 G/DL (ref 3.5–5.2)
ALBUMIN/GLOB SERPL: 1.4 G/DL
ALP SERPL-CCNC: 103 U/L (ref 39–117)
ALT SERPL W P-5'-P-CCNC: 12 U/L (ref 1–33)
ANION GAP SERPL CALCULATED.3IONS-SCNC: 8.4 MMOL/L (ref 5–15)
AST SERPL-CCNC: 15 U/L (ref 1–32)
BASOPHILS # BLD AUTO: 0.02 10*3/MM3 (ref 0–0.2)
BASOPHILS NFR BLD AUTO: 0.3 % (ref 0–1.5)
BILIRUB SERPL-MCNC: 0.2 MG/DL (ref 0–1.2)
BUN SERPL-MCNC: 21 MG/DL (ref 6–20)
BUN/CREAT SERPL: 35.6 (ref 7–25)
CALCIUM SPEC-SCNC: 9.1 MG/DL (ref 8.6–10.5)
CHLORIDE SERPL-SCNC: 105 MMOL/L (ref 98–107)
CO2 SERPL-SCNC: 26.6 MMOL/L (ref 22–29)
CREAT SERPL-MCNC: 0.59 MG/DL (ref 0.57–1)
DEPRECATED RDW RBC AUTO: 39 FL (ref 37–54)
EGFRCR SERPLBLD CKD-EPI 2021: 126.9 ML/MIN/1.73
EOSINOPHIL # BLD AUTO: 0.12 10*3/MM3 (ref 0–0.4)
EOSINOPHIL NFR BLD AUTO: 1.5 % (ref 0.3–6.2)
ERYTHROCYTE [DISTWIDTH] IN BLOOD BY AUTOMATED COUNT: 14.6 % (ref 12.3–15.4)
GLOBULIN UR ELPH-MCNC: 3 GM/DL
GLUCOSE SERPL-MCNC: 94 MG/DL (ref 65–99)
HCG INTACT+B SERPL-ACNC: <1 MIU/ML
HCT VFR BLD AUTO: 37.2 % (ref 34–46.6)
HGB BLD-MCNC: 11.3 G/DL (ref 12–15.9)
IMM GRANULOCYTES # BLD AUTO: 0.01 10*3/MM3 (ref 0–0.05)
IMM GRANULOCYTES NFR BLD AUTO: 0.1 % (ref 0–0.5)
LYMPHOCYTES # BLD AUTO: 3.37 10*3/MM3 (ref 0.7–3.1)
LYMPHOCYTES NFR BLD AUTO: 43.1 % (ref 19.6–45.3)
MCH RBC QN AUTO: 22.2 PG (ref 26.6–33)
MCHC RBC AUTO-ENTMCNC: 30.4 G/DL (ref 31.5–35.7)
MCV RBC AUTO: 73.1 FL (ref 79–97)
MONOCYTES # BLD AUTO: 0.54 10*3/MM3 (ref 0.1–0.9)
MONOCYTES NFR BLD AUTO: 6.9 % (ref 5–12)
NEUTROPHILS NFR BLD AUTO: 3.75 10*3/MM3 (ref 1.7–7)
NEUTROPHILS NFR BLD AUTO: 48.1 % (ref 42.7–76)
PLATELET # BLD AUTO: 377 10*3/MM3 (ref 140–450)
PMV BLD AUTO: 8.8 FL (ref 6–12)
POTASSIUM SERPL-SCNC: 3.8 MMOL/L (ref 3.5–5.2)
PROT SERPL-MCNC: 7.1 G/DL (ref 6–8.5)
RBC # BLD AUTO: 5.09 10*6/MM3 (ref 3.77–5.28)
SODIUM SERPL-SCNC: 140 MMOL/L (ref 136–145)
TROPONIN T SERPL HS-MCNC: <6 NG/L
WBC NRBC COR # BLD AUTO: 7.81 10*3/MM3 (ref 3.4–10.8)

## 2025-05-18 PROCEDURE — 84702 CHORIONIC GONADOTROPIN TEST: CPT | Performed by: NURSE PRACTITIONER

## 2025-05-18 PROCEDURE — 80053 COMPREHEN METABOLIC PANEL: CPT | Performed by: EMERGENCY MEDICINE

## 2025-05-18 PROCEDURE — 85025 COMPLETE CBC W/AUTO DIFF WBC: CPT | Performed by: EMERGENCY MEDICINE

## 2025-05-18 PROCEDURE — 84484 ASSAY OF TROPONIN QUANT: CPT | Performed by: EMERGENCY MEDICINE

## 2025-05-18 PROCEDURE — 93005 ELECTROCARDIOGRAM TRACING: CPT | Performed by: EMERGENCY MEDICINE

## 2025-05-18 PROCEDURE — 71045 X-RAY EXAM CHEST 1 VIEW: CPT

## 2025-05-18 PROCEDURE — 99284 EMERGENCY DEPT VISIT MOD MDM: CPT

## 2025-05-18 PROCEDURE — 36415 COLL VENOUS BLD VENIPUNCTURE: CPT

## 2025-05-18 RX ORDER — HYDROXYZINE HYDROCHLORIDE 25 MG/1
25 TABLET, FILM COATED ORAL ONCE
Status: COMPLETED | OUTPATIENT
Start: 2025-05-19 | End: 2025-05-18

## 2025-05-18 RX ORDER — SODIUM CHLORIDE 0.9 % (FLUSH) 0.9 %
10 SYRINGE (ML) INJECTION AS NEEDED
Status: DISCONTINUED | OUTPATIENT
Start: 2025-05-18 | End: 2025-05-19 | Stop reason: HOSPADM

## 2025-05-18 RX ORDER — ASPIRIN 325 MG
325 TABLET ORAL ONCE
Status: DISCONTINUED | OUTPATIENT
Start: 2025-05-18 | End: 2025-05-19 | Stop reason: HOSPADM

## 2025-05-18 RX ADMIN — HYDROXYZINE HYDROCHLORIDE 25 MG: 25 TABLET, FILM COATED ORAL at 23:36

## 2025-05-18 NOTE — Clinical Note
[facility] [place] [id number] [place] [address] [place] [address]  Phone: [phone]    Myrtle Clifford was seen and treated in our emergency department on 5/18/2025.  She may return to work on 05/21/2025.         Thank you for choosing [facility].    Tara Alanis APRN       yes

## 2025-05-19 VITALS
BODY MASS INDEX: 28.71 KG/M2 | RESPIRATION RATE: 14 BRPM | TEMPERATURE: 98.2 F | WEIGHT: 156 LBS | SYSTOLIC BLOOD PRESSURE: 94 MMHG | HEIGHT: 62 IN | OXYGEN SATURATION: 98 % | HEART RATE: 103 BPM | DIASTOLIC BLOOD PRESSURE: 47 MMHG

## 2025-05-19 LAB
GEN 5 1HR TROPONIN T REFLEX: 7 NG/L
QT INTERVAL: 377 MS
QTC INTERVAL: 463 MS
TROPONIN T NUMERIC DELTA: NORMAL

## 2025-05-19 NOTE — FSED PROVIDER NOTE
Subjective   History of Present Illness  29-year-old female presents with chest tightness, heart racing and dizziness for the past 2 to 3 days.  She denies any triggers, nothing seems to make it worse or better.  She thinks she may be pregnant, she had a positive pregnancy test at home her last period was the beginning of April.  She did have a  with a tubal ligation in 2024.    History provided by:  Patient   used: No        Review of Systems   Constitutional:  Negative for diaphoresis and fever.   Respiratory:  Positive for chest tightness. Negative for cough and shortness of breath.    Cardiovascular:  Positive for palpitations.   Gastrointestinal:  Negative for diarrhea, nausea and vomiting.   Skin:  Negative for color change and pallor.   Neurological:  Positive for dizziness. Negative for seizures, syncope, weakness, numbness and headaches.   All other systems reviewed and are negative.      Past Medical History:   Diagnosis Date    Anxiety associated with depression     Ectopic pregnancy     Hidradenitis        Allergies   Allergen Reactions    Bactrim [Sulfamethoxazole-Trimethoprim] Hives       Past Surgical History:   Procedure Laterality Date     SECTION WITH TUBAL N/A 2024    Procedure:  SECTION REPEAT WITH TUBAL;  Surgeon: Fany Parra MD;  Location: [facility] LABOR DELIVERY;  Service: Obstetrics/Gynecology;  Laterality: N/A;    CHOLECYSTECTOMY  2016    CYST REMOVAL      ECTOPIC PREGNANCY  2019    MASS EXCISION Right 2023    Procedure: SKIN LESION EXCISION, right axilla, patent supine with arm up;  Surgeon: Dion Carmona MD;  Location: [facility] MAIN OR;  Service: General;  Laterality: Right;    TEAR DUCT SURGERY Bilateral     as a baby    TONSILLECTOMY      young child       Family History   Problem Relation Age of Onset    Diabetes Mother     Hypertension Father     Diabetes Father     Hyperlipidemia Father     Diabetes Maternal  Grandmother     Cancer Maternal Grandmother     Diabetes Maternal Grandfather     Cancer Maternal Grandfather        Social History     Socioeconomic History    Marital status: Significant Other     Spouse name: Alexsander Palacio    Number of children: 4    Years of education: 14   Tobacco Use    Smoking status: Former     Current packs/day: 0.00     Average packs/day: 0.5 packs/day for 2.0 years (1.0 ttl pk-yrs)     Types: Cigarettes     Start date:      Quit date: 2016     Years since quittin.3     Passive exposure: Never    Smokeless tobacco: Never   Vaping Use    Vaping status: Never Used   Substance and Sexual Activity    Alcohol use: Not Currently    Drug use: Never    Sexual activity: Yes     Partners: Male           Objective   Physical Exam  Vitals and nursing note reviewed.   Constitutional:       General: She is not in acute distress.     Appearance: She is well-developed. She is not ill-appearing, toxic-appearing or diaphoretic.   Cardiovascular:      Rate and Rhythm: Normal rate and regular rhythm. No extrasystoles are present.     Heart sounds: Normal heart sounds. No murmur heard.     No friction rub.   Pulmonary:      Effort: Pulmonary effort is normal. No tachypnea or respiratory distress.      Breath sounds: Normal breath sounds.   Chest:      Chest wall: No tenderness.   Abdominal:      Palpations: Abdomen is soft.   Skin:     General: Skin is warm and dry.   Neurological:      Mental Status: She is alert and oriented to person, place, and time.   Psychiatric:         Mood and Affect: Mood normal.         Behavior: Behavior normal.         ECG 12 Lead      Date/Time: 2025 10:46 PM    Performed by: Tara Alanis APRN  Authorized by: Ana Chinchilla MD  Interpreted by ED physician  Comparison: compared with previous ECG from 2024  Comparison to previous ECG: SR, no significant change  Rhythm: sinus rhythm  Rate: normal  BPM: 27  QRS axis: normal  Conduction: conduction  normal  ST Segments: ST segments normal  T Waves: T waves normal  Clinical impression: normal ECG               ED Course  ED Course as of 05/19/25 0048   [place] May 18, 2025   2247 HS Troponin T: <6 [SJ]   2254 WBC: 7.81 [SJ]   2254 RBC: 5.09 [SJ]   2254 Hemoglobin(!): 11.3 [SJ]   2254 Hematocrit: 37.2 [SJ]   2254 MCV(!): 73.1 [SJ]   2254 MCH(!): 22.2 [SJ]   2254 MCHC(!): 30.4 [SJ]   2254 RDW: 14.6 [SJ]   2254 RDW-SD: 39.0 [SJ]   2254 MPV: 8.8 [SJ]   2254 Platelets: 377 [SJ]   2254 Neutrophil Rel %: 48.1 [SJ]   2254 Lymphocyte Rel %: 43.1 [SJ]   2254 Monocyte Rel %: 6.9 [SJ]   2254 Eosinophil Rel %: 1.5 [SJ]   2254 Immature Granulocyte Rel %: 0.1 [SJ]   2254 Basophil Rel %: 0.3 [SJ]   2254 Neutrophils Absolute: 3.75 [SJ]   2254 Lymphocytes Absolute(!): 3.37 [SJ]   2254 Monocytes Absolute: 0.54 [SJ]   2254 Eosinophils Absolute: 0.12 [SJ]   2254 Basophils Absolute: 0.02 [SJ]   2254 Immature Grans, Absolute: 0.01 [SJ]   2310 HS Troponin T: <6 [SJ]   2321 HCG Quantitative: <1.00 [SJ]   2321 Glucose: 94 [SJ]   2321 BUN(!): 21 [SJ]   2321 Creatinine: 0.59 [SJ]   2321 Sodium: 140 [SJ]   2321 Potassium: 3.8 [SJ]   2321 Chloride: 105 [SJ]   2321 CO2: 26.6 [SJ]   2321 Calcium: 9.1 [SJ]   2321 Total Protein: 7.1 [SJ]   2321 Albumin: 4.1 [SJ]   2321 ALT (SGPT): 12 [SJ]   2321 AST (SGOT): 15 [SJ]   2321 Alkaline Phosphatase: 103 [SJ]   2321 Total Bilirubin: 0.2 [SJ]   2321 Globulin: 3.0 [SJ]   2321 A/G Ratio: 1.4 [SJ]   2321 BUN/Creatinine Ratio(!): 35.6 [SJ]   2321 Anion Gap: 8.4 [SJ]   2321 eGFR: 126.9 [SJ]   Mon May 19, 2025   0035 HS Troponin T: 7 [SJ]   0047 Reading Physician Reading Date Result Priority  Shree Mckinley MD  [phone]  5/19/2025 STAT    Narrative & Impression  XR CHEST 1 VW     Date of Exam: 5/18/2025 10:48 PM EDT     Indication: Chest Pain Triage Protocol     Comparison: 12/17/2024.     Findings:  There is no pneumothorax, pleural effusion or focal airspace consolidation. Heart size and pulmonary  vasculature appear within normal limits. Regional bones appear intact. There is stable scarring and calcified granuloma in the right midlung periphery.   Stable calcified granulomas at the right hilum.     IMPRESSION:  Impression:  No acute cardiopulmonary abnormality.              Electronically Signed: Shree Mckinley MD    2025 12:31 AM EDT    Workstation ID: [id number]      Exam Ended: 25 22:56 EDT     [SJ]      ED Course User Index  [SJ] Tara Alanis APRN                                           Medical Decision Making  29-year-old female presents with chest tightness, heart racing and dizziness for the past 2 to 3 days.  She denies any triggers, nothing seems to make it worse or better.  She thinks she may be pregnant, she had a positive pregnancy test at home her last period was the beginning of April.  She did have a  with a tubal ligation in 2024.  Differential diagnoses include acute MI, palpitations, pneumonia, anemia, ectopic pregnancy.  This does not constitute all considered diagnoses.  Patient's testing today shows a hemoglobin of 11.3 and a hematocrit of 37.2 a white count of 7.81.  BUN of 21, initial troponin of 6, repeat troponin of 7, EKG showed normal sinus rhythm and is unchanged from previous EKG. chest x-ray showed no evidence of pneumonia.  Patient was given hydroxyzine here in the emergency department and reported that it had no effect.  Her vital signs been stable for the entire stay.  I have advised her to follow-up with her primary care and consider referral to cardiology for evaluation.  Patient reports that she has been evaluated by cardiology in the past and that they initially wanted to put her on heart medication.  They never did because she was pregnant.  Have advised her to follow-up with cardiology again and see if they will change this since she has now had a tubal ligation and pregnancy in the future is likely not to be a factor.    Problems  Addressed:  Palpitations: complicated acute illness or injury    Amount and/or Complexity of Data Reviewed  Labs: ordered. Decision-making details documented in ED Course.  Radiology: ordered.  ECG/medicine tests: ordered and independent interpretation performed.    Risk  OTC drugs.  Prescription drug management.        Final diagnoses:   Palpitations       ED Disposition  ED Disposition       ED Disposition   Discharge    Condition   Stable    Comment   --               Daniel Robles MD  [address]  [place] [address] [phone]    Call            Medication List      No changes were made to your prescriptions during this visit.

## 2025-05-19 NOTE — DISCHARGE INSTRUCTIONS
Follow-up with primary care.  Consider a referral to cardiology for evaluation.    Return to the emergency department for worsening symptoms.,  Coughing up blood, vomiting up blood or having bloody diarrhea, chest pain or shortness of breath.

## 2025-07-01 ENCOUNTER — OFFICE VISIT (OUTPATIENT)
Dept: CARDIOLOGY | Facility: CLINIC | Age: 30
End: 2025-07-01
Payer: MEDICAID

## 2025-07-01 VITALS
HEART RATE: 92 BPM | BODY MASS INDEX: 29.26 KG/M2 | RESPIRATION RATE: 18 BRPM | WEIGHT: 159 LBS | HEIGHT: 62 IN | OXYGEN SATURATION: 97 % | DIASTOLIC BLOOD PRESSURE: 72 MMHG | SYSTOLIC BLOOD PRESSURE: 109 MMHG

## 2025-07-01 DIAGNOSIS — R07.89 OTHER CHEST PAIN: ICD-10-CM

## 2025-07-01 DIAGNOSIS — R00.2 PALPITATIONS: Primary | ICD-10-CM

## 2025-07-01 PROCEDURE — 99214 OFFICE O/P EST MOD 30 MIN: CPT | Performed by: INTERNAL MEDICINE

## 2025-07-01 RX ORDER — METOPROLOL TARTRATE 1 MG/ML
5 INJECTION, SOLUTION INTRAVENOUS
OUTPATIENT
Start: 2025-07-01

## 2025-07-01 RX ORDER — SODIUM CHLORIDE 0.9 % (FLUSH) 0.9 %
10 SYRINGE (ML) INJECTION AS NEEDED
OUTPATIENT
Start: 2025-07-01

## 2025-07-01 RX ORDER — NITROGLYCERIN 0.4 MG/1
0.4 TABLET SUBLINGUAL
OUTPATIENT
Start: 2025-07-01 | End: 2025-07-01

## 2025-07-01 RX ORDER — SODIUM CHLORIDE 0.9 % (FLUSH) 0.9 %
10 SYRINGE (ML) INJECTION EVERY 12 HOURS SCHEDULED
OUTPATIENT
Start: 2025-07-01

## 2025-07-01 RX ORDER — METOPROLOL TARTRATE 25 MG/1
200 TABLET, FILM COATED ORAL ONCE
OUTPATIENT
Start: 2025-07-01 | End: 2025-07-01

## 2025-07-01 RX ORDER — METOPROLOL TARTRATE 50 MG
TABLET ORAL
Qty: 2 TABLET | Refills: 0 | Status: SHIPPED | OUTPATIENT
Start: 2025-07-01

## 2025-07-01 RX ORDER — METOPROLOL TARTRATE 25 MG/1
50 TABLET, FILM COATED ORAL ONCE
OUTPATIENT
Start: 2025-07-01

## 2025-07-01 RX ORDER — METOPROLOL TARTRATE 25 MG/1
150 TABLET, FILM COATED ORAL ONCE
OUTPATIENT
Start: 2025-07-01

## 2025-07-01 RX ORDER — SODIUM CHLORIDE 9 MG/ML
40 INJECTION, SOLUTION INTRAVENOUS AS NEEDED
OUTPATIENT
Start: 2025-07-01

## 2025-07-01 RX ORDER — METOPROLOL TARTRATE 25 MG/1
100 TABLET, FILM COATED ORAL ONCE
OUTPATIENT
Start: 2025-07-01

## 2025-07-01 RX ORDER — NITROGLYCERIN 0.4 MG/1
0.8 TABLET SUBLINGUAL
OUTPATIENT
Start: 2025-07-01

## 2025-07-01 RX ORDER — METOPROLOL TARTRATE 50 MG
50 TABLET ORAL
OUTPATIENT
Start: 2025-07-01

## 2025-07-01 NOTE — PROGRESS NOTES
Cardiology Clinic Note  Toan Palm MD, PhD    Subjective:     Encounter Date:07/01/2025      Patient ID: Myrtle Clifford is a 30 y.o. female.    Chief Complaint:  Chief Complaint   Patient presents with    Chest Pain    Palpitations       HPI:        I had the pleasure to see this very pleasant 30-year-old female back in clinic today [facility] a few years ago initially complaining of chest pain and palpitations, remotely she had a Holter monitor which demonstrated sinus rhythm, sinus tachycardia, occasional PVCs, 2D echo at an outside hospital revealed structurally normal heart with no significant valvular abnormality, normal myocardial thickness, no masses or effusions, normal EF 60%, normal RV size and function as well.  She has no family history of early sudden cardiac death or CAD.  She is a former smoker which was very limited for only a couple of years.  At that time, reassurance was provided with unremarkable findings by Holter monitor as well as structurally normal heart by 2D echo.  She was seen back in December 2024 secondary to palpitations and chest discomfort and shortness of breath in the emergency department at [facility], repeat echo at that time normal cardiac structure and function with normal right-sided pressures, no significant valvular abnormality with normal LVEF.  She has similar complaints of palpitations and chest tightness.  After long discussion we elected for coronary CT for definitive evaluation of her coronaries as well as a new heart monitor patient triggered events given she has had 3 years of similar symptoms and we need a definitive evaluation with uninformative prior evaluations and multiple hospital systems         Review of systems otherwise negative x14 point review of systems except was mentioned above     Historical data copied forward from previous encounters in EMR including the history, exam, and assessment/plan has been reviewed and is unchanged unless noted  "otherwise.     Cardiac medicines reviewed with risk, benefits, and necessity of each discussed.     Risk and benefit of cardiac testing reviewed including death heart attack stroke pain bleeding infection need for vascular /cardiovascular surgery were discussed and the patient      Objective:      Objective  Vitals reviewed below       Physical Exam  Regular rate and rhythm 90s, no rubs murmurs gallops no heaves or lift  Obese soft nontender nondistended  No clubbing cyanosis or edema  Radial pulses 2+ equal bilaterally  No carotid bruits or JVD  Normocephalic/atraumatic pupils equal round  Neurologically intact grossly  Assessment:      Assessment  Atypical chest pain, palpitations  Repeat heart monitor  Coronary CT for definitive evaluation  Prior Normal echo       Primary prevention goals  Diet and exercise per AHA guidelines     Back to primary care             The pleasure to be involved in this patient's cardiovascular care.  Please call with any questions or concerns  Toan Palm MD, PhD    Objective:         /72 (BP Location: Right arm, Patient Position: Sitting)   Pulse 92   Resp 18   Ht 157.5 cm (62\")   Wt 72.1 kg (159 lb)   SpO2 97%   BMI 29.08 kg/m²     Physical Exam    Assessment:         There are no diagnoses linked to this encounter.       Plan:              The pleasure to be involved in this patient's cardiovascular care.  Please call with any questions or concerns  Toan Palm MD, PhD    Most recent EKG as reviewed and interpreted by me:  Procedures     Most recent echo as reviewed and interpreted by me:  Results for orders placed during the hospital encounter of 12/20/24    Adult Transthoracic Echo Limited W/ Cont if Necessary Per Protocol 12/20/2024  4:41 PM    Interpretation Summary    Left ventricular ejection fraction appears to be 56 - 60%.    Left ventricular diastolic function was normal.    Normal RV systolic function.    No significant valvular disease.    Estimated " right ventricular systolic pressure from tricuspid regurgitation is normal (<35 mmHg).    Electronically signed by James Huitron MD, 24, 4:41 PM EST.      Most recent stress test as reviewed and interpreted by me:      Most recent cardiac catheterization as reviewed interpreted by me:  No results found for this or any previous visit.    The following portions of the patient's history were reviewed and updated as appropriate: allergies, current medications, past family history, past medical history, past social history, past surgical history, and problem list.      ROS:  14 point review of systems negative except as mentioned above    Current Outpatient Medications:     buPROPion XL (WELLBUTRIN XL) 150 MG 24 hr tablet, Every Morning., Disp: , Rfl:     ferrous sulfate 325 (65 FE) MG tablet, Take 1 tablet by mouth Daily With Breakfast., Disp: , Rfl:     hydrOXYzine (ATARAX) 25 MG tablet, Take 1 tablet by mouth Every 12 (Twelve) Hours As Needed for Anxiety (concern for  contractions)., Disp: 60 tablet, Rfl: 1    Problem List:  Patient Active Problem List   Diagnosis    Palpitations    Skin lesion    Status post primary low transverse  section    Status post bilateral salpingectomy    Acute on chronic anemia     Past Medical History:  Past Medical History:   Diagnosis Date    Anxiety associated with depression     Ectopic pregnancy     Hidradenitis      Past Surgical History:  Past Surgical History:   Procedure Laterality Date     SECTION WITH TUBAL N/A 2024    Procedure:  SECTION REPEAT WITH TUBAL;  Surgeon: Fany Parra MD;  Location: [facility] LABOR DELIVERY;  Service: Obstetrics/Gynecology;  Laterality: N/A;    CHOLECYSTECTOMY  2016    CYST REMOVAL      ECTOPIC PREGNANCY  2019    MASS EXCISION Right 2023    Procedure: SKIN LESION EXCISION, right axilla, patent supine with arm up;  Surgeon: Dion Carmona MD;  Location: [facility] MAIN OR;   Service: General;  Laterality: Right;    TEAR DUCT SURGERY Bilateral     as a baby    TONSILLECTOMY      young child     Social History:  Social History     Socioeconomic History    Marital status: Significant Other     Spouse name: Alexsander Palacio    Number of children: 4    Years of education: 14   Tobacco Use    Smoking status: Former     Current packs/day: 0.00     Average packs/day: 0.5 packs/day for 2.0 years (1.0 ttl pk-yrs)     Types: Cigarettes     Start date:      Quit date: 2016     Years since quittin.5     Passive exposure: Never    Smokeless tobacco: Never   Vaping Use    Vaping status: Never Used   Substance and Sexual Activity    Alcohol use: Not Currently    Drug use: Never    Sexual activity: Yes     Partners: Male     Allergies:  Allergies   Allergen Reactions    Bactrim [Sulfamethoxazole-Trimethoprim] Hives     Immunizations:  There is no immunization history for the selected administration types on file for this patient.         In-Office Procedure(s):  No orders to display        ASCVD RIsk Score::  The ASCVD Risk score (Gomez MELVIN, et al., 2019) failed to calculate for the following reasons:    The 2019 ASCVD risk score is only valid for ages 40 to 79    Imaging:    Results for orders placed during the hospital encounter of 25    XR Chest 1 View 2025 12:31 AM    Narrative  XR CHEST 1 VW    Date of Exam: 2025 10:48 PM EDT    Indication: Chest Pain Triage Protocol    Comparison: 2024.    Findings:  There is no pneumothorax, pleural effusion or focal airspace consolidation. Heart size and pulmonary vasculature appear within normal limits. Regional bones appear intact. There is stable scarring and calcified granuloma in the right midlung periphery.  Stable calcified granulomas at the right hilum.    Impression  Impression:  No acute cardiopulmonary abnormality.          Electronically Signed: Shree Mckinley MD  2025 12:31 AM EDT  Workstation ID: [id number]        Results for orders placed during the hospital encounter of 12/25/24    US Fetal Biophysical Profile;Without Non-Stress Testing 12/26/2024 12:02 AM    Narrative  US FETAL BIOPHYSICAL PROFILE;WITHOUT NON-STRESS TESTING    Date of Exam: 12/25/2024 11:13 PM EST    Indication: Decreased fetal movement.    Comparison: No comparisons available.    Technique: Grayscale, color and M-mode ultrasound evaluation of the gravid uterus were obtained transabdominally. Exam was performed per biophysical profile protocol.      Findings:  2 points are scored each for fetal breathing movement, gross body movement, fetal tone and amniotic fluid for a total score of 8/8 on the biophysical profile. Amniotic fluid index is 16.9 cm. Fetal heart rate is 136 bpm. Presentation is cephalic.    Impression  Impression:  Biophysical profile score of 8/8.        Electronically Signed: Shree Mckinley MD  12/26/2024 12:02 AM EST  Workstation ID: [id number]      Results for orders placed during the hospital encounter of 12/16/23    CT Abdomen Pelvis With Contrast 12/17/2023  2:08 AM    Narrative  CT ABDOMEN PELVIS W CONTRAST    Date of Exam: 12/17/2023 1:55 AM EST    Indication: RLQ abdominal pain (Age >= 14y).    Comparison: None available.    Technique: Axial CT images were obtained of the abdomen and pelvis following the uneventful intravenous administration of iodinated contrast. Sagittal and coronal reconstructions were performed.  Automated exposure control and iterative reconstruction  methods were used.        Findings:  Lung Bases:  The visualized lung bases and lower mediastinal structures are unremarkable.    Liver:  Liver is normal in size and CT density. No focal lesions.    Biliary/Gallbladder:  The gallbladder has been resected.. The biliary tree is nondilated.    Spleen:  Spleen is normal in size and CT density.    Pancreas:  Pancreas is normal. There is no evidence of pancreatic mass or peripancreatic fluid.    Kidneys:  Kidneys are  normal in size. There are no stones or hydronephrosis.    Adrenals:  Adrenal glands are unremarkable.    Retroperitoneal/Lymph Nodes/Vasculature:  No retroperitoneal adenopathy is identified.    Gastrointestinal/Mesentery:  The bowel loops are non-dilated without wall thickening or mass. The appendix appears within normal limits. No evidence of obstruction. No free air. No mesenteric fluid collections identified. Mild to moderate stool burden present. No evidence of hernia.    Bladder:  The bladder is normal.    Genital:  Unremarkable    Bony Structures:  Visualized bony structures are consistent with the patient's age.    Impression  Impression:  1.No acute intra-abdominal or intrapelvic process.  2.Ancillary findings as described above.        Electronically Signed: Chica Gan MD  12/17/2023 2:08 AM EST  Workstation ID: [id number]      Lab Review:   Admission on 05/18/2025, Discharged on 05/19/2025   Component Date Value    QT Interval 05/18/2025 377     QTC Interval 05/18/2025 463     Glucose 05/18/2025 94     BUN 05/18/2025 21 (H)     Creatinine 05/18/2025 0.59     Sodium 05/18/2025 140     Potassium 05/18/2025 3.8     Chloride 05/18/2025 105     CO2 05/18/2025 26.6     Calcium 05/18/2025 9.1     Total Protein 05/18/2025 7.1     Albumin 05/18/2025 4.1     ALT (SGPT) 05/18/2025 12     AST (SGOT) 05/18/2025 15     Alkaline Phosphatase 05/18/2025 103     Total Bilirubin 05/18/2025 0.2     Globulin 05/18/2025 3.0     A/G Ratio 05/18/2025 1.4     BUN/Creatinine Ratio 05/18/2025 35.6 (H)     Anion Gap 05/18/2025 8.4     eGFR 05/18/2025 126.9     HS Troponin T 05/18/2025 <6     WBC 05/18/2025 7.81     RBC 05/18/2025 5.09     Hemoglobin 05/18/2025 11.3 (L)     Hematocrit 05/18/2025 37.2     MCV 05/18/2025 73.1 (L)     MCH 05/18/2025 22.2 (L)     MCHC 05/18/2025 30.4 (L)     RDW 05/18/2025 14.6     RDW-SD 05/18/2025 39.0     MPV 05/18/2025 8.8     Platelets 05/18/2025 377     Neutrophil % 05/18/2025 48.1     Lymphocyte %  05/18/2025 43.1     Monocyte % 05/18/2025 6.9     Eosinophil % 05/18/2025 1.5     Basophil % 05/18/2025 0.3     Immature Grans % 05/18/2025 0.1     Neutrophils, Absolute 05/18/2025 3.75     Lymphocytes, Absolute 05/18/2025 3.37 (H)     Monocytes, Absolute 05/18/2025 0.54     Eosinophils, Absolute 05/18/2025 0.12     Basophils, Absolute 05/18/2025 0.02     Immature Grans, Absolute 05/18/2025 0.01     HCG Quantitative 05/18/2025 <1.00     HS Troponin T 05/18/2025 7     Troponin T Numeric Delta 05/18/2025       Recent labs reviewed and interpreted for clinical significance and application            Level of Care:           Toan Palm MD  07/01/25  .

## 2025-07-03 ENCOUNTER — PATIENT ROUNDING (BHMG ONLY) (OUTPATIENT)
Dept: CARDIOLOGY | Facility: CLINIC | Age: 30
End: 2025-07-03
Payer: MEDICAID

## 2025-07-23 ENCOUNTER — TELEPHONE (OUTPATIENT)
Dept: CARDIOLOGY | Facility: CLINIC | Age: 30
End: 2025-07-23
Payer: MEDICAID

## 2025-07-23 DIAGNOSIS — R07.89 OTHER CHEST PAIN: Primary | ICD-10-CM

## 2025-07-23 NOTE — TELEPHONE ENCOUNTER
Patient called and stated that the hospital called her and told her that the CT Angiogram was canceled due to her needing stress test per patients insurance.

## 2025-07-24 ENCOUNTER — TELEPHONE (OUTPATIENT)
Dept: CARDIOLOGY | Facility: CLINIC | Age: 30
End: 2025-07-24
Payer: MEDICAID

## 2025-07-29 ENCOUNTER — HOSPITAL ENCOUNTER (EMERGENCY)
Facility: HOSPITAL | Age: 30
Discharge: HOME | End: 2025-07-29
Attending: EMERGENCY MEDICINE | Admitting: EMERGENCY MEDICINE
Payer: MEDICAID

## 2025-07-29 VITALS
BODY MASS INDEX: 30.91 KG/M2 | HEART RATE: 84 BPM | HEIGHT: 62 IN | DIASTOLIC BLOOD PRESSURE: 72 MMHG | SYSTOLIC BLOOD PRESSURE: 107 MMHG | OXYGEN SATURATION: 98 % | RESPIRATION RATE: 16 BRPM | TEMPERATURE: 98.4 F | WEIGHT: 168 LBS

## 2025-07-29 DIAGNOSIS — R07.9 CHEST PAIN, UNSPECIFIED TYPE: Primary | ICD-10-CM

## 2025-07-29 LAB
ALBUMIN SERPL-MCNC: 3.9 G/DL (ref 3.5–5.2)
ALBUMIN/GLOB SERPL: 1.5 G/DL
ALP SERPL-CCNC: 80 U/L (ref 39–117)
ALT SERPL W P-5'-P-CCNC: 13 U/L (ref 1–33)
ANION GAP SERPL CALCULATED.3IONS-SCNC: 7.1 MMOL/L (ref 5–15)
AST SERPL-CCNC: 15 U/L (ref 1–32)
BASOPHILS # BLD AUTO: 0.03 10*3/MM3 (ref 0–0.2)
BASOPHILS NFR BLD AUTO: 0.4 % (ref 0–1.5)
BILIRUB SERPL-MCNC: 0.3 MG/DL (ref 0–1.2)
BUN SERPL-MCNC: 8 MG/DL (ref 6–20)
BUN/CREAT SERPL: 12.1 (ref 7–25)
CALCIUM SPEC-SCNC: 8.8 MG/DL (ref 8.6–10.5)
CHLORIDE SERPL-SCNC: 107 MMOL/L (ref 98–107)
CO2 SERPL-SCNC: 24.9 MMOL/L (ref 22–29)
CREAT SERPL-MCNC: 0.66 MG/DL (ref 0.57–1)
DEPRECATED RDW RBC AUTO: 41.7 FL (ref 37–54)
EGFRCR SERPLBLD CKD-EPI 2021: 122.7 ML/MIN/1.73
EOSINOPHIL # BLD AUTO: 0.1 10*3/MM3 (ref 0–0.4)
EOSINOPHIL NFR BLD AUTO: 1.4 % (ref 0.3–6.2)
ERYTHROCYTE [DISTWIDTH] IN BLOOD BY AUTOMATED COUNT: 15.6 % (ref 12.3–15.4)
GLOBULIN UR ELPH-MCNC: 2.6 GM/DL
GLUCOSE SERPL-MCNC: 116 MG/DL (ref 65–99)
HCT VFR BLD AUTO: 35.9 % (ref 34–46.6)
HGB BLD-MCNC: 11 G/DL (ref 12–15.9)
IMM GRANULOCYTES # BLD AUTO: 0.01 10*3/MM3 (ref 0–0.05)
IMM GRANULOCYTES NFR BLD AUTO: 0.1 % (ref 0–0.5)
LYMPHOCYTES # BLD AUTO: 3.54 10*3/MM3 (ref 0.7–3.1)
LYMPHOCYTES NFR BLD AUTO: 50.9 % (ref 19.6–45.3)
MCH RBC QN AUTO: 22.5 PG (ref 26.6–33)
MCHC RBC AUTO-ENTMCNC: 30.6 G/DL (ref 31.5–35.7)
MCV RBC AUTO: 73.6 FL (ref 79–97)
MONOCYTES # BLD AUTO: 0.42 10*3/MM3 (ref 0.1–0.9)
MONOCYTES NFR BLD AUTO: 6 % (ref 5–12)
NEUTROPHILS NFR BLD AUTO: 2.86 10*3/MM3 (ref 1.7–7)
NEUTROPHILS NFR BLD AUTO: 41.2 % (ref 42.7–76)
NT-PROBNP SERPL-MCNC: 124.6 PG/ML (ref 0–450)
PLATELET # BLD AUTO: 306 10*3/MM3 (ref 140–450)
PMV BLD AUTO: 8.9 FL (ref 6–12)
POTASSIUM SERPL-SCNC: 3.5 MMOL/L (ref 3.5–5.2)
PROT SERPL-MCNC: 6.5 G/DL (ref 6–8.5)
RBC # BLD AUTO: 4.88 10*6/MM3 (ref 3.77–5.28)
SODIUM SERPL-SCNC: 139 MMOL/L (ref 136–145)
TROPONIN T SERPL HS-MCNC: <6 NG/L
WBC NRBC COR # BLD AUTO: 6.96 10*3/MM3 (ref 3.4–10.8)

## 2025-07-29 PROCEDURE — 80053 COMPREHEN METABOLIC PANEL: CPT | Performed by: NURSE PRACTITIONER

## 2025-07-29 PROCEDURE — 36415 COLL VENOUS BLD VENIPUNCTURE: CPT

## 2025-07-29 PROCEDURE — 93005 ELECTROCARDIOGRAM TRACING: CPT | Performed by: NURSE PRACTITIONER

## 2025-07-29 PROCEDURE — 85025 COMPLETE CBC W/AUTO DIFF WBC: CPT | Performed by: NURSE PRACTITIONER

## 2025-07-29 PROCEDURE — 99283 EMERGENCY DEPT VISIT LOW MDM: CPT

## 2025-07-29 PROCEDURE — 84484 ASSAY OF TROPONIN QUANT: CPT | Performed by: NURSE PRACTITIONER

## 2025-07-29 PROCEDURE — 83880 ASSAY OF NATRIURETIC PEPTIDE: CPT | Performed by: NURSE PRACTITIONER

## 2025-07-29 RX ORDER — KETOROLAC TROMETHAMINE 30 MG/ML
15 INJECTION, SOLUTION INTRAMUSCULAR; INTRAVENOUS ONCE
Status: DISCONTINUED | OUTPATIENT
Start: 2025-07-29 | End: 2025-07-29

## 2025-07-29 RX ORDER — ONDANSETRON 2 MG/ML
4 INJECTION INTRAMUSCULAR; INTRAVENOUS ONCE
Status: DISCONTINUED | OUTPATIENT
Start: 2025-07-29 | End: 2025-07-29

## 2025-07-29 NOTE — Clinical Note
[facility] FSED [place] [id number] [place] [address] [place] [address]  Phone: [phone]    Myrtle Clifford was seen and treated in our emergency department on 7/29/2025.  She may return to work on 07/31/2025.         Thank you for choosing [facility].    Tara Alanis APRN

## 2025-07-29 NOTE — FSED PROVIDER NOTE
"Subjective   History of Present Illness  30-year-old female presents with 3-day history of chest pain.  She already sees cardiology.  She reports she is dizzy.  A TSH in  was 0.132.  Patient does not currently take any medications for her thyroid.  She was supposed to have a stress test for tomorrow but it was canceled.  Patient states she does not know why and when she tried to call the number back to reschedule she was on hold for \"20 minutes\".  She reports her cardiologist told her to come here, she then revised it and said no they actually advised that I go to [facility] but patient states that she knows someone who works here so she decided to come here.  Patient is in no acute distress.  Her vital signs are stable.      History provided by:  Patient   used: No        Review of Systems    Past Medical History:   Diagnosis Date    Anxiety associated with depression     Ectopic pregnancy     Hidradenitis     Palpitations        Allergies   Allergen Reactions    Bactrim [Sulfamethoxazole-Trimethoprim] Hives       Past Surgical History:   Procedure Laterality Date     SECTION WITH TUBAL N/A 2024    Procedure:  SECTION REPEAT WITH TUBAL;  Surgeon: Fany Parra MD;  Location: [facility] LABOR DELIVERY;  Service: Obstetrics/Gynecology;  Laterality: N/A;    CHOLECYSTECTOMY  2016    CYST REMOVAL      ECTOPIC PREGNANCY  2019    MASS EXCISION Right 2023    Procedure: SKIN LESION EXCISION, right axilla, patent supine with arm up;  Surgeon: Dion Carmona MD;  Location: [facility] MAIN OR;  Service: General;  Laterality: Right;    TEAR DUCT SURGERY Bilateral     as a baby    TONSILLECTOMY      young child       Family History   Problem Relation Age of Onset    Diabetes Mother     Hypertension Father     Diabetes Father     Hyperlipidemia Father     Diabetes Maternal Grandmother     Cancer Maternal Grandmother     Diabetes Maternal Grandfather     Cancer Maternal " Grandfather        Social History     Socioeconomic History    Marital status: Single     Spouse name: Alexsander Palacio    Number of children: 4    Years of education: 14   Tobacco Use    Smoking status: Former     Current packs/day: 0.00     Average packs/day: 0.5 packs/day for 2.0 years (1.0 ttl pk-yrs)     Types: Cigarettes     Start date:      Quit date:      Years since quittin.5     Passive exposure: Never    Smokeless tobacco: Never   Vaping Use    Vaping status: Never Used   Substance and Sexual Activity    Alcohol use: Not Currently    Drug use: Never    Sexual activity: Yes     Partners: Male           Objective   Physical Exam  Vitals and nursing note reviewed.   Constitutional:       General: She is not in acute distress.     Appearance: She is well-developed. She is not ill-appearing, toxic-appearing or diaphoretic.   Cardiovascular:      Rate and Rhythm: Normal rate and regular rhythm.      Heart sounds: Normal heart sounds.   Pulmonary:      Effort: Pulmonary effort is normal. No respiratory distress.      Breath sounds: Normal breath sounds.   Skin:     General: Skin is warm and dry.      Capillary Refill: Capillary refill takes less than 2 seconds.   Neurological:      Mental Status: She is alert and oriented to person, place, and time.   Psychiatric:         Mood and Affect: Mood normal.         Behavior: Behavior normal.         Procedures           ED Course  ED Course as of 25 HS Troponin T: <6 [SJ]    proBNP: 124.6 [SJ]   1853 Glucose(!): 116 [SJ]   1853 BUN: 8.0 [SJ]    Creatinine: 0.66 [SJ]    Sodium: 139 [SJ]    Potassium: 3.5 [SJ]    Chloride: 107 [SJ]    CO2: 24.9 [SJ]    Calcium: 8.8 [SJ]    Total Protein: 6.5 [SJ]    Albumin: 3.9 [SJ]    ALT (SGPT): 13 [SJ]    AST (SGOT): 15 [SJ]    Alkaline Phosphatase: 80 [SJ]   1853 Total Bilirubin: 0.3 [SJ]    Globulin: 2.6 [SJ]   1853 A/G Ratio: 1.5  "[SJ]   1853 BUN/Creatinine Ratio: 12.1 [SJ]   1853 Anion Gap: 7.1 [SJ]   1853 eGFR: 122.7 [SJ]   1900 WBC: 6.96 [SJ]   1900 RBC: 4.88 [SJ]   1900 Hemoglobin(!): 11.0 [SJ]   1900 Hematocrit: 35.9 [SJ]   1900 MCV(!): 73.6 [SJ]   1900 MCH(!): 22.5 [SJ]   1900 MCHC(!): 30.6 [SJ]   1900 RDW(!): 15.6 [SJ]   1900 RDW-SD: 41.7 [SJ]   1900 MPV: 8.9 [SJ]   1900 Platelets: 306 [SJ]   1900 Neutrophil Rel %(!): 41.2 [SJ]   1900 Lymphocyte Rel %(!): 50.9 [SJ]   1900 Monocyte Rel %: 6.0 [SJ]   1900 Eosinophil Rel %: 1.4 [SJ]   1900 Basophil Rel %: 0.4 [SJ]   1900 Immature Granulocyte Rel %: 0.1 [SJ]   1900 Neutrophils Absolute: 2.86 [SJ]   1900 Lymphocytes Absolute(!): 3.54 [SJ]   1900 Monocytes Absolute: 0.42 [SJ]   1900 Eosinophils Absolute: 0.10 [SJ]   1900 Basophils Absolute: 0.03 [SJ]   1900 Immature Grans, Absolute: 0.01 [SJ]      ED Course User Index  [SJ] Tara Alanis APRN                HEART Score: 0                            Medical Decision Making  30-year-old female presents with 3-day history of chest pain.  She already sees cardiology.  She reports she is dizzy.  A TSH in 2002 was 0.132.  Patient does not currently take any medications for her thyroid.  She was supposed to have a stress test for tomorrow but it was canceled.  Patient states she does not know why and when she tried to call the number back to reschedule she was on hold for \"20 minutes\".  She reports her cardiologist told her to come here, she then revised it and said no they actually advised that I go to [facility] but patient states that she knows someone who works here so she decided to come here.  Patient is in no acute distress.  Her vital signs are stable.  EKG showed sinus rhythm in triage.  An IV was attempted.  The patient did not like \"the nurse wiggling the needle around\".  She has elected to have a straight stick for her lab work.  Patient continues to have anemia, she states she is taking iron for that.  Patient's glucose was " slightly elevated at 116 otherwise the rest of her CMP was normal.  Her troponin was less than 6, proBNP was 124.6.  Heart score was 0.  Patient has anxiety associated with depression she has had an ectopic pregnancy, hidradenitis and has palpitations.  I have advised her to follow-up with her cardiologist and return here for worsening symptoms.  Patient verbalized understanding.  She was also advised to drink plenty of fluids.    Amount and/or Complexity of Data Reviewed  Labs: ordered. Decision-making details documented in ED Course.  ECG/medicine tests: ordered.        Final diagnoses:   Chest pain, unspecified type       ED Disposition  ED Disposition       ED Disposition   Discharge    Condition   Stable    Comment   --               Daniel Robles MD  [address] [address] [phone]    Call            Medication List      No changes were made to your prescriptions during this visit.

## 2025-07-29 NOTE — DISCHARGE INSTRUCTIONS
Follow-up with your cardiologist.  Give them a call first thing in the morning and see if you can get your stress test rescheduled.    Return to the emergency department for worsening symptoms, coughing up blood, vomiting up blood or having bloody diarrhea or shortness of breath.    Take your regularly prescribed medicines as directed.

## 2025-07-31 DIAGNOSIS — R00.2 PALPITATIONS: Primary | ICD-10-CM

## 2025-07-31 LAB
QT INTERVAL: 357 MS
QTC INTERVAL: 459 MS

## 2025-07-31 PROCEDURE — 93005 ELECTROCARDIOGRAM TRACING: CPT | Performed by: NURSE PRACTITIONER

## 2025-08-27 ENCOUNTER — TELEMEDICINE (OUTPATIENT)
Dept: CARDIOLOGY | Facility: CLINIC | Age: 30
End: 2025-08-27
Payer: MEDICAID

## 2025-08-27 VITALS — HEART RATE: 119 BPM

## 2025-08-27 DIAGNOSIS — R07.89 OTHER CHEST PAIN: ICD-10-CM

## 2025-08-27 DIAGNOSIS — R00.2 PALPITATIONS: Primary | ICD-10-CM

## 2025-08-27 PROCEDURE — 1159F MED LIST DOCD IN RCRD: CPT | Performed by: INTERNAL MEDICINE

## 2025-08-27 PROCEDURE — 1160F RVW MEDS BY RX/DR IN RCRD: CPT | Performed by: INTERNAL MEDICINE

## 2025-08-27 PROCEDURE — 99213 OFFICE O/P EST LOW 20 MIN: CPT | Performed by: INTERNAL MEDICINE

## (undated) DEVICE — DECANTER: Brand: UNBRANDED

## (undated) DEVICE — DRSNG WND BORDR/ADHS NONADHR/GZ LF 4X4IN STRL

## (undated) DEVICE — NDL HYPO PRECISIONGLIDE/REG 25G 5/8 BLU

## (undated) DEVICE — KT SURG TURNOVER 050

## (undated) DEVICE — SOLUTION,WATER,IRRIGATION,1000ML,STERILE: Brand: MEDLINE

## (undated) DEVICE — UNDERGLV SURG BIOGEL INDICATOR LF PF 7.5

## (undated) DEVICE — GLV SURG BIOGEL LTX PF 7

## (undated) DEVICE — CVR HNDL LT SURG ACCSSRY BLU STRL

## (undated) DEVICE — SUT MNCRYL 0 CT 36IN

## (undated) DEVICE — GLV SURG NEOLON 2G PF LF 6.5 STRL

## (undated) DEVICE — SUT ETHLN 3/0 FS1 30IN 669H

## (undated) DEVICE — SUT VIC 4/0 P2 J504G

## (undated) DEVICE — SUT MNCRYL 0/0 CTX 36IN Y398H

## (undated) DEVICE — TRY SADDLE BLCK 25G

## (undated) DEVICE — SUT VIC 1 CTX 36IN OBGYN VCP977H

## (undated) DEVICE — GLV SURG SENSICARE PI MIC PF SZ6.5 LF STRL

## (undated) DEVICE — SUT VIC 3/0 CT2 27IN J232H

## (undated) DEVICE — PK C SECT 50

## (undated) DEVICE — DRSNG WND BORDR/ADHS NONADHR/GZ LF 4X10IN STRL

## (undated) DEVICE — NDL HYPO PRECISIONGLIDE REG 22G 1 1/2

## (undated) DEVICE — ANTIBACTERIAL UNDYED BRAIDED (POLYGLACTIN 910), SYNTHETIC ABSORBABLE SUTURE: Brand: COATED VICRYL

## (undated) DEVICE — SUT VICRYL 3/0 CT1 27IN J258H

## (undated) DEVICE — SPNG LAP PREWSH SFTPK 18X18IN STRL PK/5

## (undated) DEVICE — TBG PENCL TELESCP MEGADYNE SMOKE EVAC 15FT

## (undated) DEVICE — SUT GUT PLN 0 STD TIE 54IN S104H

## (undated) DEVICE — DRSNG WND GZ PAD BORDERED 4X8IN STRL

## (undated) DEVICE — PK MINOR LAPAROTOMY 50

## (undated) DEVICE — SOL IRRIG NACL 9PCT 1000ML BTL

## (undated) DEVICE — SUT ETHLN 4/0 PS2 18IN 1667H

## (undated) DEVICE — SOL IRRIG H2O 1000ML STRL